# Patient Record
Sex: MALE | Race: WHITE | NOT HISPANIC OR LATINO | Employment: OTHER | URBAN - METROPOLITAN AREA
[De-identification: names, ages, dates, MRNs, and addresses within clinical notes are randomized per-mention and may not be internally consistent; named-entity substitution may affect disease eponyms.]

---

## 2018-03-21 DIAGNOSIS — M54.2 CERVICALGIA: Primary | ICD-10-CM

## 2018-04-02 PROCEDURE — 97140 MANUAL THERAPY 1/> REGIONS: CPT

## 2018-04-02 PROCEDURE — 97110 THERAPEUTIC EXERCISES: CPT

## 2018-04-04 ENCOUNTER — CLINICAL SUPPORT (OUTPATIENT)
Dept: REHABILITATION | Facility: HOSPITAL | Age: 83
End: 2018-04-04
Payer: MEDICARE

## 2018-04-04 DIAGNOSIS — M54.2 CERVICALGIA: ICD-10-CM

## 2018-04-04 DIAGNOSIS — M62.81 MUSCLE WEAKNESS (GENERALIZED): ICD-10-CM

## 2018-04-04 PROCEDURE — 97140 MANUAL THERAPY 1/> REGIONS: CPT | Mod: PN

## 2018-04-04 PROCEDURE — 97110 THERAPEUTIC EXERCISES: CPT | Mod: PN

## 2018-04-04 NOTE — PROGRESS NOTES
Physical Therapy Daily Treatment Note   Name: Jeffrey Ocasio  [facility] Number: [id number]    Evaluation Date: 4/4/2018  Visit #: 7/ 12   Authorization period Expiration: 12/31/18  Plan of Care Expiration: 05/01/2018  Precautions: standard    Time In: 8:00  Time Out: 9:30  Total 1:1 Treatment Time: 60 min    Diagnosis:   Encounter Diagnoses   Name Primary?    Cervicalgia     Muscle weakness (generalized)      Physician: Cinthia Shrestha MD  Treatment Orders: PT Eval and Treat    Subjective   Pt reports: patient reports no change in neck pain.    Pain Scale:  2/10 on VAS currently.   Pain Location: posterior cervical extending into left upper trap    Objective   JEFFREY received therapeutic exercises to develop strength, endurance, ROM, flexibility, posture and core stabilization for 30 minutes including:  Chin tucks x 10 reps  Seated Thoracic Extension over bolster in chair - hands behind head x 15 reps  Seated: PNF Pattern UE Ex in D1/D2 pattern with Red Tband x 12 reps each  Wall Slides with BUE x 25 reps  Supine: Serratus Punches with 3# x 15 reps  Supine: Shoulder flexion with 4#bar - bolseter under upper T-Spine x 15 reps  Nu-Step x 20 mins        JEFFREY received the following manual therapy techniques: Joint mobilizations, Manual traction and Soft tissue Mobilization were applied to the: Cervical and upper thoracic areas for 20  minutes.       Education provided re: Posture; patient requires constant cueing for sitting and standing posture; he continues to round shoulders forward, head flexed, posterior pelvic tilt in sitting.  It's a constant rodriguez to get him to establish good posture and seriously doubt whether he is compliant with this at home.   - progress towards goals   - role of PT in multi - disciplinary team, goals for PT  No spiritual or educational barriers to learning provided    Written Home Exercises Provided: .  Exercises were reviewed and JEFFREY was able to  demonstrate them prior to the end of the session.   Pt received a written copy of exercises to perform at home. ROSIE demonstrated fair  understanding of the education provided.     Assessment   ROSIE is progressing well towards his goals and no updates to goals at this time.     Pt prognosis is Fair. Pt will continue to benefit from skilled outpatient physical therapy to address the deficits listed in the problem list chart on initial evaluation, provide pt/family education and to maximize pt's level of independence in the home and community environment.     Medical necessity is demonstrated by the following IMPAIRMENTS/PROBLEM LIST:  Cervical Pain, Muscle Weakness in CORE mm and general extremities; Postural deficts    Anticipated barriers to physical therapy: patient's chronic postural deficts and sedentary lifestyle  Pt's spiritual, cultural and educational needs considered and pt agreeable to plan of care and goals.      Long Term goals:   1. Subjective pain decreased from 2/10 to 0/10 in Cervical Spine with return to daily activities  2. Patient able to demonstrate neutral neck posture in sitting and standing positions  3. Patient able to demonstrate improved cercial rotation to the left by 10 degrees without increased symptoms.  4. Patient indep with HEP for Posture  5. Neck Pain Disability Score decreased from 22% to <10% disability.      Plan   Continue with established Plan of Care towards Physical Therapy goals.   Discussed Plan of Care with patient: Yes    Dena Bauman, PT

## 2018-04-10 ENCOUNTER — CLINICAL SUPPORT (OUTPATIENT)
Dept: REHABILITATION | Facility: HOSPITAL | Age: 83
End: 2018-04-10
Payer: MEDICARE

## 2018-04-10 DIAGNOSIS — M62.81 MUSCLE WEAKNESS (GENERALIZED): ICD-10-CM

## 2018-04-10 DIAGNOSIS — M54.2 CERVICALGIA: Primary | ICD-10-CM

## 2018-04-10 PROCEDURE — 97110 THERAPEUTIC EXERCISES: CPT | Mod: PN

## 2018-04-10 PROCEDURE — 97140 MANUAL THERAPY 1/> REGIONS: CPT | Mod: PN

## 2018-04-10 NOTE — PROGRESS NOTES
Physical Therapy Daily Treatment Note   Name: Jeffrey Ocasio  [facility] Number: [id number]    Evaluation Date: 4/10/2018  Visit #: 8/ 12   Authorization period Expiration: 12/31/18  Plan of Care Expiration: 05/01/2018  Precautions: standard    Time In: 1:05  Time Out: 2:00  Total 1:1 Treatment Time:45 min    Diagnosis:   Encounter Diagnoses   Name Primary?    Cervicalgia Yes    Muscle weakness (generalized)      Physician: Cinthia Shrestha MD  Treatment Orders: PT Eval and Treat    Subjective   Pt reports: patient reports positve change in neck pain after last visit. Reports 0-1/10 pain in cervical/left upper trap with left rotation since last visit.    Pain Scale:  1/10 on VAS currently.   Pain Location: posterior cervical extending into left upper trap    Objective   JEFFREY received therapeutic exercises to develop strength, endurance, ROM, flexibility, posture and core stabilization for 30 minutes including:  Chin tucks x 10 reps  Seated Thoracic Extension over bolster in chair - hands behind head x 15 reps  Standing:: PNF Pattern UE Ex in D1/D2 pattern with Red Tband x 12 reps each  Wall Slides with BUE x 25 reps  Supine: Serratus Punches with 3# x 15 reps  Supine: Shoulder flexion with 4#bar -Tennis ball- under upper T-Spine x 15 reps          JEFFREY received the following manual therapy techniques: Joint mobilizations, Manual traction and Soft tissue Mobilization with and witthout  IASTM were applied to the: Cervical and upper thoracic areas for 15  minutes.       Education provided re: Posture; patient requires constant cueing for sitting and standing posture; he continues to round shoulders forward, head flexed, posterior pelvic tilt in sitting.  It's a constant rodriguez to get him to establish good posture and seriously doubt whether he is compliant with this at home.   - progress towards goals   - role of PT in multi - disciplinary team, goals for PT  No spiritual or educational  barriers to learning provided    Written Home Exercises Provided: .  Exercises were reviewed and ROSIE was able to demonstrate them prior to the end of the session.   Pt received a written copy of exercises to perform at home. ROSIE demonstrated fair  understanding of the education provided.     Assessment   ROSIE is progressing well towards his goals and no updates to goals at this time.     Pt prognosis is Fair. Pt will continue to benefit from skilled outpatient physical therapy to address the deficits listed in the problem list chart on initial evaluation, provide pt/family education and to maximize pt's level of independence in the home and community environment.     Medical necessity is demonstrated by the following IMPAIRMENTS/PROBLEM LIST:  Cervical Pain, Muscle Weakness in CORE mm and general extremities; Postural deficts    Anticipated barriers to physical therapy: patient's chronic postural deficts and sedentary lifestyle  Pt's spiritual, cultural and educational needs considered and pt agreeable to plan of care and goals.      Long Term goals:   1. Subjective pain decreased from 2/10 to 0/10 in Cervical Spine with return to daily activities  2. Patient able to demonstrate neutral neck posture in sitting and standing positions  3. Patient able to demonstrate improved cercial rotation to the left by 10 degrees without increased symptoms.  4. Patient indep with HEP for Posture  5. Neck Pain Disability Score decreased from 22% to <10% disability.      Plan   Continue with established Plan of Care towards Physical Therapy goals.   Discussed Plan of Care with patient: Yes    Dena Bauman, PT

## 2018-04-12 ENCOUNTER — CLINICAL SUPPORT (OUTPATIENT)
Dept: REHABILITATION | Facility: HOSPITAL | Age: 83
End: 2018-04-12
Payer: MEDICARE

## 2018-04-12 DIAGNOSIS — M62.81 MUSCLE WEAKNESS (GENERALIZED): ICD-10-CM

## 2018-04-12 DIAGNOSIS — M54.2 CERVICALGIA: Primary | ICD-10-CM

## 2018-04-12 PROCEDURE — 97110 THERAPEUTIC EXERCISES: CPT | Mod: PN

## 2018-04-12 PROCEDURE — 97140 MANUAL THERAPY 1/> REGIONS: CPT | Mod: PN

## 2018-04-13 NOTE — PROGRESS NOTES
Physical Therapy Daily Treatment Note   Name: Jeffrey Ocasio  [facility] Number: [id number]    Evaluation Date: 4/12/2018  Visit #: 9/ 12   Authorization period Expiration: 12/31/18  Plan of Care Expiration: 05/01/2018  Precautions: standard    Time In: 1:05  Time Out: 2:20  Total 1:1 Treatment Time:45 min    Diagnosis:   Encounter Diagnoses   Name Primary?    Cervicalgia Yes    Muscle weakness (generalized)      Physician: Cinthia Shrestha MD  Treatment Orders: PT Eval and Treat    Subjective   Pt reports: patient reports positve change in neck pain after last visit. Reports 0-1/10 pain in cervical/left upper trap with left rotation since last visit.    Pain Scale:  1/10 on VAS currently.   Pain Location: posterior cervical extending into left upper trap    Objective   JEFFREY received therapeutic exercises to develop strength, endurance, ROM, flexibility, posture and core stabilization for 30 minutes including:  Chin tucks x 10 reps  Seated Thoracic Extension over bolster in chair - hands behind head x 15 reps  Standing:: PNF Pattern UE Ex in D1/D2 pattern with Red Tband x 12 reps each  Wall Slides with BUE x 25 reps  Supine: Serratus Punches with 3# x 15 reps  Supine: Shoulder flexion with 4#bar -Tennis ball- under upper T-Spine x 15 reps          JEFFREY received the following manual therapy techniques: Joint mobilizations, Manual traction and Soft tissue Mobilization with and witthout  IASTM were applied to the: Cervical and upper thoracic areas for 15  Minutes. Thoracic extension mobilization - grade II to III from CT junction thru T9. Had patient perform seated thoracic extension over bolster x 10 reps following mobilization.      Education provided re: Posture; patient requires constant cueing for sitting and standing posture; he continues to round shoulders forward, head flexed, posterior pelvic tilt in sitting.  It's a constant rodriguez to get him to establish good posture and  seriously doubt whether he is compliant with this at home.   - progress towards goals   - role of PT in multi - disciplinary team, goals for PT  No spiritual or educational barriers to learning provided    Written Home Exercises Provided: .  Exercises were reviewed and ROSIE was able to demonstrate them prior to the end of the session.   Pt received a written copy of exercises to perform at home. ROSIE demonstrated fair  understanding of the education provided.     Assessment   ROSIE is progressing well towards his goals and no updates to goals at this time.     Pt prognosis is Fair. Pt will continue to benefit from skilled outpatient physical therapy to address the deficits listed in the problem list chart on initial evaluation, provide pt/family education and to maximize pt's level of independence in the home and community environment.     Medical necessity is demonstrated by the following IMPAIRMENTS/PROBLEM LIST:  Cervical Pain, Muscle Weakness in CORE mm and general extremities; Postural deficts    Anticipated barriers to physical therapy: patient's chronic postural deficts and sedentary lifestyle  Pt's spiritual, cultural and educational needs considered and pt agreeable to plan of care and goals.      Long Term goals:   1. Subjective pain decreased from 2/10 to 0/10 in Cervical Spine with return to daily activities  2. Patient able to demonstrate neutral neck posture in sitting and standing positions  3. Patient able to demonstrate improved cercial rotation to the left by 10 degrees without increased symptoms.  4. Patient indep with HEP for Posture  5. Neck Pain Disability Score decreased from 22% to <10% disability.      Plan   Continue with established Plan of Care towards Physical Therapy goals.   Discussed Plan of Care with patient: Yes    Dena Bauman, PT

## 2018-04-18 ENCOUNTER — CLINICAL SUPPORT (OUTPATIENT)
Dept: REHABILITATION | Facility: HOSPITAL | Age: 83
End: 2018-04-18
Payer: MEDICARE

## 2018-04-18 DIAGNOSIS — M62.81 MUSCLE WEAKNESS (GENERALIZED): ICD-10-CM

## 2018-04-18 DIAGNOSIS — M54.2 CERVICALGIA: Primary | ICD-10-CM

## 2018-04-18 PROCEDURE — 97140 MANUAL THERAPY 1/> REGIONS: CPT | Mod: PN

## 2018-04-18 PROCEDURE — 97110 THERAPEUTIC EXERCISES: CPT | Mod: PN

## 2018-04-18 PROCEDURE — G8981 BODY POS CURRENT STATUS: HCPCS | Mod: CJ,PN

## 2018-04-18 PROCEDURE — G8982 BODY POS GOAL STATUS: HCPCS | Mod: CI,PN

## 2018-04-18 NOTE — PROGRESS NOTES
Physical Therapy Daily Treatment Note   Name: Jeffrey Ocasio  [facility] Number: [id number]    Evaluation Date: 4/18/2018  Visit #: 10/ 12   Authorization period Expiration: 12/31/18  Plan of Care Expiration: 05/01/2018  Precautions: standard    Time In: 12:50  Time Out: 2:20  Total 1:1 Treatment Time   45  min    Diagnosis:   Encounter Diagnoses   Name Primary?    Cervicalgia Yes    Muscle weakness (generalized)      Physician: Cinthia Shrestha MD  Treatment Orders: PT Eval and Treat    Subjective   Pt reports: patient reports positve change in neck pain after last visit. Reports 0-1/10 pain in cervical/left upper trap with left rotation since last visit.    Pain Scale:  3/10 on VAS currently.   Pain Location: posterior cervical extending into left upper trap    Objective   JEFFREY received therapeutic exercises to develop strength, endurance, ROM, flexibility, posture and core stabilization for 30 minutes including:  Chin tucks x 10 reps  Seated Thoracic Extension over bolster in chair - hands behind head x 15 reps  Standing:: PNF Pattern UE Ex in D1/D2 pattern with Red Tband x 12 reps each  Wall Slides with BUE x 25 reps  Supine: Serratus Punches with 3# x 15 reps  Supine: Shoulder flexion with 4#bar -Tennis ball- under upper T-Spine x 15 reps          JEFFREY received the following manual therapy techniques: Joint mobilizations, Manual traction and Soft tissue Mobilization with and witthout  IASTM were applied to the: Cervical and upper thoracic areas for 15  Minutes. Thoracic extension mobilization - grade II to III from CT junction thru T9. Had patient perform seated thoracic extension over bolster x 10 reps following mobilization.      Education provided re: Posture; patient requires constant cueing for sitting and standing posture; he continues to round shoulders forward, head flexed, posterior pelvic tilt in sitting.  It's a constant rodriguez to get him to establish good posture and  seriously doubt whether he is compliant with this at home.   - progress towards goals   - role of PT in multi - disciplinary team, goals for PT  No spiritual or educational barriers to learning provided    Written Home Exercises Provided: .  Exercises were reviewed and ROSIE was able to demonstrate them prior to the end of the session.   Pt received a written copy of exercises to perform at home. ROSIE demonstrated fair  understanding of the education provided.     Assessment   ROSIE is progressing well towards his goals and no updates to goals at this time.  Patient with little lasting pain effects from therapy.  He continues with very poor posture with little lasting improvement in this.     Pt prognosis is Fair. Pt will continue to benefit from skilled outpatient physical therapy to address the deficits listed in the problem list chart on initial evaluation, provide pt/family education and to maximize pt's level of independence in the home and community environment.     Medical necessity is demonstrated by the following IMPAIRMENTS/PROBLEM LIST:  Cervical Pain, Muscle Weakness in CORE mm and general extremities; Postural deficts    Anticipated barriers to physical therapy: patient's chronic postural deficts and sedentary lifestyle  Pt's spiritual, cultural and educational needs considered and pt agreeable to plan of care and goals.      Long Term goals:   1. Subjective pain decreased from 2/10 to 0/10 in Cervical Spine with return to daily activities  2. Patient able to demonstrate neutral neck posture in sitting and standing positions  3. Patient able to demonstrate improved cercial rotation to the left by 10 degrees without increased symptoms.  4. Patient indep with HEP for Posture  5. Neck Pain Disability Score decreased from 22% to <10% disability.      Plan   Continue with established Plan of Care towards Physical Therapy goals.   Discussed Plan of Care with patient: Yes    Dena Bauman, PT

## 2018-04-20 ENCOUNTER — CLINICAL SUPPORT (OUTPATIENT)
Dept: REHABILITATION | Facility: HOSPITAL | Age: 83
End: 2018-04-20
Payer: MEDICARE

## 2018-04-20 DIAGNOSIS — M54.2 CERVICALGIA: Primary | ICD-10-CM

## 2018-04-20 DIAGNOSIS — M62.81 MUSCLE WEAKNESS (GENERALIZED): ICD-10-CM

## 2018-04-20 PROCEDURE — 97140 MANUAL THERAPY 1/> REGIONS: CPT | Mod: PN

## 2018-04-20 NOTE — PROGRESS NOTES
Physical Therapy Daily Treatment Note   Name: Jeffrey Ocasio  Clinic Number: [id number]    Evaluation Date: 4/20/2018  Visit #: 11/ 12   Authorization period Expiration: 12/31/18  Plan of Care Expiration: 05/01/2018  Precautions: standard    Time In: 10:50  Time Out: 11:50  Total 1:1 Treatment Time  20  min    Diagnosis:   Encounter Diagnoses   Name Primary?    Cervicalgia Yes    Muscle weakness (generalized)      Physician: Cinthia Shrestha MD  Treatment Orders: PT Eval and Treat    Subjective   Pt reports: patient reports positve change in neck pain after last visit. Reports 0-1/10 pain in cervical/left upper trap with left rotation since last visit.    Pain Scale: 2/10 on VAS currently.   Pain Location: posterior cervical extending into left upper trap    Objective   JEFFREY received therapeutic exercises to develop strength, endurance, ROM, flexibility, posture and core stabilization for 30 minutes including: - patient performed these indep with supervision as needed  Chin tucks x 10 reps  Seated Thoracic Extension over bolster in chair - hands behind head x 15 reps  Standing:: PNF Pattern UE Ex in D1/D2 pattern with Red Tband x 12 reps each  Wall Slides with BUE x 25 reps  Supine: Serratus Punches with 3# x 15 reps  Supine: Shoulder flexion with 4#bar -Tennis ball- under upper T-Spine x 15 reps          JEFFREY received the following manual therapy techniques: Joint mobilizations, Manual traction and Soft tissue Mobilization with and witthout  IASTM were applied to the: Cervical and upper thoracic areas for 20 Minutes. Thoracic extension mobilization - grade II to III from CT junction thru T9. Had patient perform seated thoracic extension over bolster x 10 reps following mobilization.      Education provided re: Posture; patient requires constant cueing for sitting and standing posture; he continues to round shoulders forward, head flexed, posterior pelvic tilt in sitting.  It's a  constant rodriguez to get him to establish good posture and seriously doubt whether he is compliant with this at home.   - progress towards goals   - role of PT in multi - disciplinary team, goals for PT  No spiritual or educational barriers to learning provided    Written Home Exercises Provided: .  Exercises were reviewed and ROSIE was able to demonstrate them prior to the end of the session.   Pt received a written copy of exercises to perform at home. ROSIE demonstrated fair  understanding of the education provided.     Assessment   ROSIE is really making little progress towards his goals.  He continues to have the same pain in the left upper trapezius area with rotation of cervical spine to the left.  His pain is no worse, has not progressed to radiating any further down his left arm.  His pain is due to cervical disc degeneration and poor posture/muscular activation in the posterior shoulder girdle area.   Patient with little lasting pain effects from therapy.  He continues with very poor posture with little lasting improvement in this.     Pt prognosis is Fair. Pt will continue to benefit from skilled outpatient physical therapy to address the deficits listed in the problem list chart on initial evaluation, provide pt/family education and to maximize pt's level of independence in the home and community environment.     Medical necessity is demonstrated by the following IMPAIRMENTS/PROBLEM LIST:  Cervical Pain, Muscle Weakness in CORE mm and general extremities; Postural deficts    Anticipated barriers to physical therapy: patient's chronic postural deficts and sedentary lifestyle  Pt's spiritual, cultural and educational needs considered and pt agreeable to plan of care and goals.      Long Term goals:   1. Subjective pain decreased from 2/10 to 0/10 in Cervical Spine with return to daily activities  2. Patient able to demonstrate neutral neck posture in sitting and standing positions  3. Patient able to  demonstrate improved cercial rotation to the left by 10 degrees without increased symptoms.  4. Patient indep with HEP for Posture  5. Neck Pain Disability Score decreased from 22% to <10% disability.      Plan   Continue with established Plan of Care - patient returning to [place] next week - will see for a visit next Monday before he returns to [place].   Discussed Plan of Care with patient: Yes    Dena Bauman, PT

## 2018-04-23 ENCOUNTER — CLINICAL SUPPORT (OUTPATIENT)
Dept: REHABILITATION | Facility: HOSPITAL | Age: 83
End: 2018-04-23
Payer: MEDICARE

## 2018-04-23 DIAGNOSIS — M54.2 CERVICALGIA: Primary | ICD-10-CM

## 2018-04-23 DIAGNOSIS — M62.81 MUSCLE WEAKNESS (GENERALIZED): ICD-10-CM

## 2018-04-23 PROCEDURE — 97110 THERAPEUTIC EXERCISES: CPT | Mod: PN

## 2018-04-23 PROCEDURE — 97140 MANUAL THERAPY 1/> REGIONS: CPT | Mod: PN

## 2018-04-23 NOTE — PROGRESS NOTES
Physical Therapy Daily Treatment Note   Name: Jeffrey Ocasio  Clinic Number: [id number]    Evaluation Date: 4/23/2018  Visit #: 12/ 12   Authorization period Expiration: 12/31/18  Plan of Care Expiration: 05/01/2018  Precautions: standard    Time In: 2:00  Time Out: 3:00  Total 1:1 Treatment Time  30  min    Diagnosis:   Encounter Diagnoses   Name Primary?    Cervicalgia Yes    Muscle weakness (generalized)      Physician: Cinthia Shrestha MD  Treatment Orders: PT Eval and Treat    Subjective   Pt reports: patient reports positve change in neck pain after last visit. Reports 0-1/10 pain in cervical/left upper trap with left rotation since last visit.    Pain Scale: 2/10 on VAS currently.   Pain Location: posterior cervical extending into left upper trap    Objective   JEFFREY received therapeutic exercises to develop strength, endurance, ROM, flexibility, posture and core stabilization for 30 minutes including: - patient performed these indep with supervision as needed  Chin tucks x 10 reps  Seated Thoracic Extension over bolster in chair - hands behind head x 15 reps  Standing:: PNF Pattern UE Ex in D1/D2 pattern with Red Tband x 12 reps each  Wall Slides with BUE x 25 reps  Supine: Serratus Punches with 3# x 15 reps  Supine: Shoulder flexion with 4#bar -Tennis ball- under upper T-Spine x 15 reps          JEFFREY received the following manual therapy techniques: Joint mobilizations, Manual traction and Soft tissue Mobilization with and witthout  IASTM were applied to the: Cervical and upper thoracic areas for 20 Minutes. Thoracic extension mobilization - grade II to III from CT junction thru T9. Had patient perform seated thoracic extension over bolster x 10 reps following mobilization.      Education provided re: Posture; patient requires constant cueing for sitting and standing posture; he continues to round shoulders forward, head flexed, posterior pelvic tilt in sitting.  It's a  constant rodriguez to get him to establish good posture and seriously doubt whether he is compliant with this at home.   - progress towards goals   - role of PT in multi - disciplinary team, goals for PT  No spiritual or educational barriers to learning provided    Written Home Exercises Provided: .  Exercises were reviewed and JEFFREY was able to demonstrate them prior to the end of the session.   Pt received a written copy of exercises to perform at home. JEFFREY demonstrated fair  understanding of the education provided.     Assessment   JEFFREY is really making little progress towards his goals.  He continues to have the same pain in the left upper trapezius area with rotation of cervical spine to the left.  His pain is no worse, has not progressed to radiating any further down his left arm.  His pain is due to cervical disc degeneration and poor posture/muscular activation in the posterior shoulder girdle area.   Patient with little lasting pain effects from therapy.  He continues with very poor posture with little lasting improvement in this.   There has been improvements in his active cervical ROM since starting therapy, as well as no increase in pain or radicular symptoms.  Subjective pain stays at 2-3/10 and is always with Left Cervical Rotation.    Current AROM:    Flexion: from 32 degrees to 34 degrees  Extension:  From 30 to 40 degrees  Right Rotation: from 80 to 80 degrees  Left Rotation: from 68 to 80 degrees.  Sidebending: patient does not have functional sidebending on either side - has to rotate first before he can bring his ear to his shoulder.     Pt prognosis is Fair Jeffrey has had 12 Physical Therapy vistis with no significant improvement in his primary complaint of neck pain when he turns his head. I have been able to improve his overall cervical ROM as well as make an effort to get him to exercise alittle more.  Postural issues are his primary deficit with significant disuse muscle atrophy  throughout his upper quarter.  Jeffrey is not interested in making any concerted effort to improve his posture or increase his activity level, so I feel that continued therapy would be of little benefit.      Medical necessity is demonstrated by the following IMPAIRMENTS/PROBLEM LIST:  Cervical Pain, Muscle Weakness in CORE mm and general extremities; Postural deficts    Anticipated barriers to physical therapy: patient's chronic postural deficts and sedentary lifestyle  Pt's spiritual, cultural and educational needs considered and pt agreeable to plan of care and goals.      Long Term goals:   1. Subjective pain decreased from 2/10 to 0/10 in Cervical Spine with return to daily activities  2. Patient able to demonstrate neutral neck posture in sitting and standing positions  3. Patient able to demonstrate improved cercial rotation to the left by 10 degrees without increased symptoms.  4. Patient indep with HEP for Posture  5. Neck Pain Disability Score decreased from 22% to <10% disability.      Plan   Progress Report sent to Dr. Shrestha - patient will return to discuss after this vist.   Discussed Plan of Care with patient: Yes    Dena Bauman, PT

## 2018-04-26 ENCOUNTER — CLINICAL SUPPORT (OUTPATIENT)
Dept: REHABILITATION | Facility: HOSPITAL | Age: 83
End: 2018-04-26
Payer: MEDICARE

## 2018-04-27 NOTE — PROGRESS NOTES
Patient just came from Dr. Shrestha's office - brought new orders for physical therapy for general strengthening to address weakness.  Patient did not want to stay today to do anything.  He scheduled for treatment 2x/week to begin next week.  Reassess next week for establishment of new goals.

## 2018-05-02 ENCOUNTER — CLINICAL SUPPORT (OUTPATIENT)
Dept: REHABILITATION | Facility: HOSPITAL | Age: 83
End: 2018-05-02
Payer: MEDICARE

## 2018-05-02 DIAGNOSIS — M62.81 MUSCLE WEAKNESS (GENERALIZED): Primary | ICD-10-CM

## 2018-05-02 PROCEDURE — 97164 PT RE-EVAL EST PLAN CARE: CPT | Mod: PN

## 2018-05-02 PROCEDURE — G8978 MOBILITY CURRENT STATUS: HCPCS | Mod: CK,PN

## 2018-05-02 PROCEDURE — G8979 MOBILITY GOAL STATUS: HCPCS | Mod: CJ,PN

## 2018-05-02 NOTE — PROGRESS NOTES
Physical Therapy Daily Treatment Note     Name: Jeffrey Ocasio  Clinic Number: [id number]    Therapy Diagnosis:   Encounter Diagnosis   Name Primary?    Muscle weakness (generalized) Yes     Physician: Cinthia Shrestha MD    Physician Orders: Physical Therapy Eval and Treat for generalized muscle weakness  Medical Diagnosis: gerneralized weakness   Evaluation Date:   Authorization period Expiration: 18  Plan of Care Certification Period: 18    Visit #: 12/ Visits authorized: 12  Time In:2:00  Time Out: 3:20  Total Billable Time: 45 minutes    Precautions: std    Subjective   Pt reports: I talked Dr. Shrestha into giving me a script to continue with my therapy so that I can at least come in and get some exercise. I know that I need it.             Response to previous treatment:patient came for 12 visits for cervicalgia; he did not make progress as far as subjective pain is concerned, but he made good progress with ROM increases in his neck. Jeffrey recognizes that most of his problems are related to posture and a sedentary lifestyle.  He is accepting of the fact that he needs to strengthen himself up.   Functional change: see previous progress report to MD.    Pain:  Current 2/10, worst 3/10, best 0/10   Location: back  and neck  bilateral    Objective   JEFFREY received therapeutic exercises to develop strength, flexibility, posture and core stabilization for 45  minutes includin. Nu-Step x 20 mins L5  2. Vigor Gym x 8 mins   3. Bridges x 15  4. SLR x 10  5. Hip Abduction x 10  6. DKC with Swiss Ball x 3 mins  7. Lumbar rolls x 3 mins  8. Seated Lumbar Flex with SB x 4 mins  9. Scapular Retraction 15x with black band  10. Wall Slides x 20    JEFFREY received the following supervised modalities after being cleared for contradictions    Home Exercises Provided and Patient Education Provided     Education provided re:   - progress towards goals   - role of therapy in multi  "- disciplinary team, goals for therapy  No spiritual or educational barriers to learning provided    Written Home Exercises Provided: .  Exercises were reviewed and JEFFREY was able to demonstrate them prior to the end of the session.   Pt received a written copy of exercises to perform at home. JEFFREY demonstrated good  understanding of the education provided.     Assessment   Jeffrey is reporting decreased "steadiness' on his feet as well as continued lower back pain.  He has been coming for therapy to address cervical pain for past 12 visits with improvement in cervical ROM but no improvement in pain. Patient with sedentary lifestyle, muscle disuse atrophy in CORE mm as well as decreased ability to engage in sustained activity secondary to fatigue. Jeffrey realizes that he needs to exercise and feels that participating in a formal Physical Therapy program will assist him in improving his functional mobility and improve his quality of life.   JEFFREY is progressing well towards his goals.     30 sec Chair <>Stand: 5 reps  6 min walk test: able to ambulate 4 mins on level surface. Stops activity secondary to increased LBP and fatigue.   G-Codes:  Current:: CK        Goal: CJ  Pt prognosis is Good.     Pt will continue to benefit from skilled outpatient physical therapy to address the deficits listed in the problem list box on initial evaluation, provide pt/family education and to maximize pt's level of independence in the home and community environment.     Anticipated barriers to physical therapy: patient's reluctance to be consistent with any lasting exercise program.    Pt's spiritual, cultural and educational needs considered and pt agreeable to plan of care and goals.    Goals:   LTG: ( 4 weeks)  1. Subjective pain no more than 2/10 with daily activity  2. Patient able to complete 8 reps chair <> 30 secs (<20" height)  3. Patient able to demonstrate imrpoved endurance for activity - able to ambulate x 6 " mins without increase in symptoms.       Plan   Continue with established plan of care. Yes, patient to continue 2x/week x 4-6 weeks for Ther Exercise, strengthening, cardiovascular activity    Dena Bauman, PT

## 2018-05-04 ENCOUNTER — CLINICAL SUPPORT (OUTPATIENT)
Dept: REHABILITATION | Facility: HOSPITAL | Age: 83
End: 2018-05-04
Payer: MEDICARE

## 2018-05-04 DIAGNOSIS — M62.81 MUSCLE WEAKNESS (GENERALIZED): Primary | ICD-10-CM

## 2018-05-04 PROCEDURE — 97110 THERAPEUTIC EXERCISES: CPT | Mod: PN

## 2018-05-04 NOTE — PROGRESS NOTES
Physical Therapy Daily Treatment Note     Name: Jeffrey Ocasio  [facility] Number: [id number]    Therapy Diagnosis:   Encounter Diagnosis   Name Primary?    Muscle weakness (generalized) Yes     Physician: Cinthia Shrestha MD    Physician Orders: Physical Therapy Eval and Treat for generalized muscle weakness  Medical Diagnosis: gerneralized weakness   Evaluation Date: 18  Authorization period Expiration: 18  Plan of Care Certification Period: 18    Visit #: 12/ Visits authorized: 12  Time In:10:55  Time Out: 12:05  Total Billable Time: 45 minutes    Precautions: std    Subjective   Pt reports:no new c/o's  Response to previous treatment:  Functional change: see previous progress report to MD.    Pain:  Current 2/10, worst 3/10, best 0/10   Location: back  and neck  bilateral    Objective   JEFFREY received therapeutic exercises to develop strength, flexibility, posture and core stabilization for 45  minutes includin. Nu-Step x 20 mins L5  2. Vigor Gym x 8 mins   3. Bridges x 20  4. SLR x 10  5. Hip Abduction x 10  6. DKC with Swiss Ball x 3 mins  7. Lumbar rolls x 3 mins  8. Seated Lumbar Flex with SB x 4 mins  9. Scapular Retraction 15x with black band  10. Wall Slides x 20    JEFFREY received the following supervised modalities after being cleared for contradictions    Home Exercises Provided and Patient Education Provided     Education provided re:   - progress towards goals   - role of therapy in multi - disciplinary team, goals for therapy  No spiritual or educational barriers to learning provided    Written Home Exercises Provided: .  Exercises were reviewed and JEFFREY was able to demonstrate them prior to the end of the session.   Pt received a written copy of exercises to perform at home. JEFFREY demonstrated good  understanding of the education provided.     Assessment   Jeffrey is JEFFREY is progressing well towards his goals. Patient did well with exercises.     30  "sec Chair <>Stand: 5 reps  6 min walk test: able to ambulate 4 mins on level surface. Stops activity secondary to increased LBP and fatigue.   G-Codes:  Current:: CK        Goal: CJ  Pt prognosis is Good.     Pt will continue to benefit from skilled outpatient physical therapy to address the deficits listed in the problem list box on initial evaluation, provide pt/family education and to maximize pt's level of independence in the home and community environment.     Anticipated barriers to physical therapy: patient's reluctance to be consistent with any lasting exercise program.    Pt's spiritual, cultural and educational needs considered and pt agreeable to plan of care and goals.    Goals:   LTG: ( 4 weeks)  1. Subjective pain no more than 2/10 with daily activity  2. Patient able to complete 8 reps chair <> 30 secs (<20" height)  3. Patient able to demonstrate imrpoved endurance for activity - able to ambulate x 6 mins without increase in symptoms.       Plan   Continue with established plan of care. Yes, patient to continue 2x/week x 4-6 weeks for Ther Exercise, strengthening, cardiovascular activity    Jonathan Favre, PTA   "

## 2018-05-09 ENCOUNTER — CLINICAL SUPPORT (OUTPATIENT)
Dept: REHABILITATION | Facility: HOSPITAL | Age: 83
End: 2018-05-09
Payer: MEDICARE

## 2018-05-09 DIAGNOSIS — M62.81 MUSCLE WEAKNESS (GENERALIZED): Primary | ICD-10-CM

## 2018-05-09 PROCEDURE — 97110 THERAPEUTIC EXERCISES: CPT | Mod: PN

## 2018-05-09 NOTE — PROGRESS NOTES
Physical Therapy Daily Treatment Note     Name: Jeffrey Ocasio  [facility] Number: [id number]    Therapy Diagnosis:   Encounter Diagnosis   Name Primary?    Muscle weakness (generalized) Yes     Physician: Cinthia Shrestha MD    Physician Orders: Physical Therapy Eval and Treat for generalized muscle weakness  Medical Diagnosis: gerneralized weakness   Evaluation Date: 18  Authorization period Expiration: 18  Plan of Care Certification Period: 18    Visit #: 12/ Visits authorized: 12  Time In: 1:40  Time Out: 2:45  Total Billable Time: 45 minutes    Precautions: std    Subjective   Pt reports:no new c/o's  Response to previous treatment:  Functional change: see previous progress report to MD.    Pain:  Current 3/10, worst 3/10, best 0/10   Location: back  and neck  bilateral    Objective   JEFFREY received therapeutic exercises to develop strength, flexibility, posture and core stabilization for 45 minutes includin. Nu-Step x 20 mins L5  2. Vigor Gym level 7 x 8 mins   3. Bridges x 20  4. SLR x 10  5. Hip Abduction x 10  6. DKC with Swiss Ball x 3 mins  7. Lumbar rolls x 3 mins  8. Seated Lumbar Flex with SB x 4 mins  9. Scapular Retraction 15x with black band  10. Wall Slides x 20    JEFFREY received the following supervised modalities after being cleared for contradictions    Home Exercises Provided and Patient Education Provided     Education provided re:   - progress towards goals   - role of therapy in multi - disciplinary team, goals for therapy  No spiritual or educational barriers to learning provided    Written Home Exercises Provided: .  Exercises were reviewed and JEFFREY was able to demonstrate them prior to the end of the session.    JEFFREY demonstrated good  understanding of the education provided.     Assessment   Jeffrey is JEFFREY is progressing well towards his goals. Patient did well with exercises.     30 sec Chair <>Stand: 5 reps  6 min walk test: able  "to ambulate 4 mins on level surface. Stops activity secondary to increased LBP and fatigue.   G-Codes:  Current:: CK        Goal: CJ  Pt prognosis is Good.     Pt will continue to benefit from skilled outpatient physical therapy to address the deficits listed in the problem list box on initial evaluation, provide pt/family education and to maximize pt's level of independence in the home and community environment.     Anticipated barriers to physical therapy: patient's reluctance to be consistent with any lasting exercise program.    Pt's spiritual, cultural and educational needs considered and pt agreeable to plan of care and goals.    Goals:   LTG: ( 4 weeks)  1. Subjective pain no more than 2/10 with daily activity  2. Patient able to complete 8 reps chair <> 30 secs (<20" height)  3. Patient able to demonstrate imrpoved endurance for activity - able to ambulate x 6 mins without increase in symptoms.       Plan   Continue with established plan of care. Yes, patient to continue 2x/week x 4-6 weeks for Ther Exercise, strengthening, cardiovascular activity    Jonathan Favre, PTA   "

## 2018-05-11 ENCOUNTER — CLINICAL SUPPORT (OUTPATIENT)
Dept: REHABILITATION | Facility: HOSPITAL | Age: 83
End: 2018-05-11
Payer: MEDICARE

## 2018-05-11 DIAGNOSIS — M62.81 MUSCLE WEAKNESS (GENERALIZED): Primary | ICD-10-CM

## 2018-05-11 PROCEDURE — 97110 THERAPEUTIC EXERCISES: CPT | Mod: PN

## 2018-05-16 ENCOUNTER — CLINICAL SUPPORT (OUTPATIENT)
Dept: REHABILITATION | Facility: HOSPITAL | Age: 83
End: 2018-05-16
Payer: MEDICARE

## 2018-05-16 DIAGNOSIS — M62.81 MUSCLE WEAKNESS (GENERALIZED): Primary | ICD-10-CM

## 2018-05-16 PROCEDURE — 97110 THERAPEUTIC EXERCISES: CPT | Mod: PN

## 2018-05-16 NOTE — PROGRESS NOTES
Physical Therapy Daily Treatment Note     Name: Jeffrey Ocasio  Clinic Number: [id number]    Therapy Diagnosis:   Encounter Diagnosis   Name Primary?    Muscle weakness (generalized) Yes     Physician: Cinthia Shrestha MD    Physician Orders: Physical Therapy Eval and Treat for generalized muscle weakness  Medical Diagnosis: gerneralized weakness   Evaluation Date: 18  Authorization period Expiration: 18  Plan of Care Certification Period: 18    Visit #: 4/ Visits authorized: 12  Time In: 1:00  Time Out: 1:45  Total Billable Time: 30 minutes    Precautions: std    Subjective   Pt reports: increased soreness in LB after working in the garage yesterday.  Response to previous treatment:  Functional change: see previous progress report to MD.    Pain:  Current 4/10, worst 4/10, best 0/10   Location: back  and neck  bilateral    Objective   JEFFREY received therapeutic exercises to develop strength, flexibility, posture and core stabilization for 45 minutes includin. Nu-Step x 20 mins L5-DNP  2. Vigor Gym level 7 x 8 mins   3. Bridges x 20  4. SLR x 10  5. Hip Abduction x 10  6. DKC with Swiss Ball x 3 mins  7. Lumbar rolls x 3 mins  8. Seated Lumbar Flex with SB x 4 mins  9. Scapular Retraction 20 x with black band  10. Wall Slides x 20    JEFFREY received the following supervised modalities after being cleared for contradictions    Home Exercises Provided and Patient Education Provided     Education provided re:   - progress towards goals   - role of therapy in multi - disciplinary team, goals for therapy  No spiritual or educational barriers to learning provided    Written Home Exercises Provided: .  Exercises were reviewed and JEFFREY was able to demonstrate them prior to the end of the session.    JEFFREY demonstrated good  understanding of the education provided.     Assessment   Jeffrey is JEFFREY is progressing well towards his goals. Patient did well with exercises.  "    30 sec Chair <>Stand: 5 reps  6 min walk test: able to ambulate 4 mins on level surface. Stops activity secondary to increased LBP and fatigue.   G-Codes:  Current:: CK        Goal: CJ  Pt prognosis is Good.     Pt will continue to benefit from skilled outpatient physical therapy to address the deficits listed in the problem list box on initial evaluation, provide pt/family education and to maximize pt's level of independence in the home and community environment.     Anticipated barriers to physical therapy: patient's reluctance to be consistent with any lasting exercise program.    Pt's spiritual, cultural and educational needs considered and pt agreeable to plan of care and goals.    Goals:   LTG: ( 4 weeks)  1. Subjective pain no more than 2/10 with daily activity  2. Patient able to complete 8 reps chair <> 30 secs (<20" height)  3. Patient able to demonstrate imrpoved endurance for activity - able to ambulate x 6 mins without increase in symptoms.       Plan   Continue with established plan of care. Yes, patient to continue 2x/week x 4-6 weeks for Ther Exercise, strengthening, cardiovascular activity    Jonathan Favre, PTA   "

## 2018-05-21 ENCOUNTER — CLINICAL SUPPORT (OUTPATIENT)
Dept: REHABILITATION | Facility: HOSPITAL | Age: 83
End: 2018-05-21
Payer: MEDICARE

## 2018-05-21 DIAGNOSIS — M62.81 MUSCLE WEAKNESS (GENERALIZED): Primary | ICD-10-CM

## 2018-05-21 PROCEDURE — 97110 THERAPEUTIC EXERCISES: CPT | Mod: PN

## 2018-05-21 NOTE — PROGRESS NOTES
Physical Therapy Daily Treatment Note     Name: Jeffrey Ocasio  [facility] Number: [id number]    Therapy Diagnosis:   Encounter Diagnosis   Name Primary?    Muscle weakness (generalized) Yes     Physician: Cinthia Shrestha MD    Physician Orders: Physical Therapy Eval and Treat for generalized muscle weakness  Medical Diagnosis: gerneralized weakness   Evaluation Date: 18  Authorization period Expiration: 18  Plan of Care Certification Period: 18    Visit #: 6/ Visits authorized: 12  Time In: 1:55  Time Out: 3:00  Total Billable Time: 30 minutes    Precautions: std    Subjective   Pt reports:   Response to previous treatment:  Functional change: see previous progress report to MD.    Pain:  Current 2/10, worst 2/10, best 0/10   Location: back  and neck  bilateral    Objective   JEFFREY received therapeutic exercises to develop strength, flexibility, posture and core stabilization for 45 minutes includin. Nu-Step x 20 mins L5  2. Vigor Gym level 7 x 8 mins   3. Bridges x 20  4. SLR 2 x 10  5. Hip Abduction x 10  6. DKC with Swiss Ball x 3 mins  7. Lumbar rolls x 3 mins  8. Seated Lumbar Flex with SB x 4 mins  9. Scapular Retraction 20 x with black band  10. Wall Slides x 20    JEFFREY received the following supervised modalities after being cleared for contradictions    Home Exercises Provided and Patient Education Provided     Education provided re:   - progress towards goals   - role of therapy in multi - disciplinary team, goals for therapy  No spiritual or educational barriers to learning provided    Written Home Exercises Provided: .  Exercises were reviewed and JEFFREY was able to demonstrate them prior to the end of the session.    JEFFREY demonstrated good  understanding of the education provided.     Assessment   Jeffrey is JEFFREY is progressing well towards his goals. Patient did well with exercises.     30 sec Chair <>Stand: 5 reps  6 min walk test: able to  "ambulate 4 mins on level surface. Stops activity secondary to increased LBP and fatigue.   G-Codes:  Current:: CK        Goal: CJ  Pt prognosis is Good.     Pt will continue to benefit from skilled outpatient physical therapy to address the deficits listed in the problem list box on initial evaluation, provide pt/family education and to maximize pt's level of independence in the home and community environment.     Anticipated barriers to physical therapy: patient's reluctance to be consistent with any lasting exercise program.    Pt's spiritual, cultural and educational needs considered and pt agreeable to plan of care and goals.    Goals:   LTG: ( 4 weeks)  1. Subjective pain no more than 2/10 with daily activity  2. Patient able to complete 8 reps chair <> 30 secs (<20" height)  3. Patient able to demonstrate imrpoved endurance for activity - able to ambulate x 6 mins without increase in symptoms.       Plan   Continue with established plan of care. Yes, patient to continue 2x/week x 4-6 weeks for Ther Exercise, strengthening, cardiovascular activity    Jonathan Favre, PTA   "

## 2018-05-24 ENCOUNTER — CLINICAL SUPPORT (OUTPATIENT)
Dept: REHABILITATION | Facility: HOSPITAL | Age: 83
End: 2018-05-24
Payer: MEDICARE

## 2018-05-24 DIAGNOSIS — M62.81 MUSCLE WEAKNESS (GENERALIZED): Primary | ICD-10-CM

## 2018-05-24 PROCEDURE — 97110 THERAPEUTIC EXERCISES: CPT | Mod: PN

## 2018-05-24 NOTE — PROGRESS NOTES
Physical Therapy Daily Treatment Note     Name: Jeffrey Ocasio  [facility] Number: [id number]    Therapy Diagnosis:   Encounter Diagnosis   Name Primary?    Muscle weakness (generalized) Yes     Physician: Cinthia Shrestha MD    Physician Orders: Physical Therapy Eval and Treat for generalized muscle weakness  Medical Diagnosis: gerneralized weakness   Evaluation Date: 18  Authorization period Expiration: 18  Plan of Care Certification Period: 18    Visit #: 7/ Visits authorized: 12  Time In: 2:05  Time Out: 3:10  Total Billable Time: 45 minutes    Precautions: std    Subjective   Pt reports:   Response to previous treatment:  Functional change: see previous progress report to MD.    Pain:  Current 3/10, worst 3/10, best 0/10   Location: left hip    Objective   JEFFREY received therapeutic exercises to develop strength, flexibility, posture and core stabilization for 45 minutes includin. Nu-Step x 20 mins L5  2. Vigor Gym level 7 x 8 mins   3. Bridges x 20  4. SLR 2 x 10  5. Hip Abduction x 10  6. DKC with Swiss Ball x 3 mins  7. Lumbar rolls x 3 mins  8. Seated Lumbar Flex with SB x 4 mins  9. Scapular Retraction 20 x with black band  10. Wall Slides x 20    JEFFREY received the following supervised modalities after being cleared for contradictions    Home Exercises Provided and Patient Education Provided     Education provided re:   - progress towards goals   - role of therapy in multi - disciplinary team, goals for therapy  No spiritual or educational barriers to learning provided    Written Home Exercises Provided: .  Exercises were reviewed and JEFFREY was able to demonstrate them prior to the end of the session.    JEFFREY demonstrated good  understanding of the education provided.     Assessment   Jeffrey is JEFFREY is progressing well towards his goals. Patient did well with exercises.     30 sec Chair <>Stand: 5 reps  6 min walk test: able to ambulate 4 mins on level  "surface. Stops activity secondary to increased LBP and fatigue.   G-Codes:  Current:: CK        Goal: CJ  Pt prognosis is Good.     Pt will continue to benefit from skilled outpatient physical therapy to address the deficits listed in the problem list box on initial evaluation, provide pt/family education and to maximize pt's level of independence in the home and community environment.     Anticipated barriers to physical therapy: patient's reluctance to be consistent with any lasting exercise program.    Pt's spiritual, cultural and educational needs considered and pt agreeable to plan of care and goals.    Goals:   LTG: ( 4 weeks)  1. Subjective pain no more than 2/10 with daily activity  2. Patient able to complete 8 reps chair <> 30 secs (<20" height)  3. Patient able to demonstrate imrpoved endurance for activity - able to ambulate x 6 mins without increase in symptoms.       Plan   Continue with established plan of care. Yes, patient to continue 2x/week x 4-6 weeks for Ther Exercise, strengthening, cardiovascular activity    Jonathan Favre, PTA   "

## 2018-05-29 ENCOUNTER — CLINICAL SUPPORT (OUTPATIENT)
Dept: REHABILITATION | Facility: HOSPITAL | Age: 83
End: 2018-05-29
Payer: MEDICARE

## 2018-05-29 DIAGNOSIS — M62.81 MUSCLE WEAKNESS (GENERALIZED): Primary | ICD-10-CM

## 2018-05-29 PROCEDURE — 97110 THERAPEUTIC EXERCISES: CPT | Mod: PN

## 2018-05-29 NOTE — PROGRESS NOTES
Physical Therapy Daily Treatment Note     Name: Jeffrey Booker  Clinic Number: [id number]    Therapy Diagnosis:   Encounter Diagnosis   Name Primary?    Muscle weakness (generalized) Yes     Physician: Cinthia Shrestha MD    Physician Orders: Physical Therapy Eval and Treat for generalized muscle weakness  Medical Diagnosis: gerneralized weakness   Evaluation Date: 18  Authorization period Expiration: 18  Plan of Care Certification Period: 18    Visit #: 8/ Visits authorized: 12  Time In: 2:10  Time Out: 3:15  Total Billable Time: 45 minutes    Precautions: std    Subjective   Pt reports: no new c/o's  Response to previous treatment:  Functional change: see previous progress report to MD.    Pain:  Current 0/10, worst 0/10, best 0/10   Location:     Objective   JEFFREY received therapeutic exercises to develop strength, flexibility, posture and core stabilization for 45 minutes includin. Nu-Step x 20 mins L5  2. Vigor Gym level 7 x 8 mins   3. Bridges x 20  4. SLR 2 x 10  5. Hip Abduction x 10  6. DKC with Swiss Ball x 3 mins  7. Lumbar rolls x 3 mins  8. Seated Lumbar Flex with SB x 4 mins  9. Scapular Retraction 20 x with black band  10. Wall Slides x 20    JEFFREY received the following supervised modalities after being cleared for contradictions    Home Exercises Provided and Patient Education Provided     Education provided re:   - progress towards goals   - role of therapy in multi - disciplinary team, goals for therapy  No spiritual or educational barriers to learning provided    Written Home Exercises Provided: .  Exercises were reviewed and JEFFREY was able to demonstrate them prior to the end of the session.    JEFFREY demonstrated good  understanding of the education provided.     Assessment   Jeffrey is JEFFREY is progressing well towards his goals. Patient did well with exercises.     30 sec Chair <>Stand: 5 reps  6 min walk test: able to ambulate 4 mins on  "level surface. Stops activity secondary to increased LBP and fatigue.   G-Codes:  Current:: CK        Goal: CJ  Pt prognosis is Good.     Pt will continue to benefit from skilled outpatient physical therapy to address the deficits listed in the problem list box on initial evaluation, provide pt/family education and to maximize pt's level of independence in the home and community environment.     Anticipated barriers to physical therapy: patient's reluctance to be consistent with any lasting exercise program.    Pt's spiritual, cultural and educational needs considered and pt agreeable to plan of care and goals.    Goals:   LTG: ( 4 weeks)  1. Subjective pain no more than 2/10 with daily activity  2. Patient able to complete 8 reps chair <> 30 secs (<20" height)  3. Patient able to demonstrate imrpoved endurance for activity - able to ambulate x 6 mins without increase in symptoms.       Plan   Continue with established plan of care. Yes, patient to continue 2x/week x 4-6 weeks for Ther Exercise, strengthening, cardiovascular activity    Jonathan Favre, PTA   "

## 2018-05-31 ENCOUNTER — CLINICAL SUPPORT (OUTPATIENT)
Dept: REHABILITATION | Facility: HOSPITAL | Age: 83
End: 2018-05-31
Payer: MEDICARE

## 2018-05-31 DIAGNOSIS — M62.81 MUSCLE WEAKNESS (GENERALIZED): Primary | ICD-10-CM

## 2018-05-31 PROCEDURE — 97110 THERAPEUTIC EXERCISES: CPT | Mod: PN

## 2018-05-31 NOTE — PROGRESS NOTES
Physical Therapy Daily Treatment Note     Name: Jeffrey Ocasio  [facility] Number: [id number]    Therapy Diagnosis:   Encounter Diagnosis   Name Primary?    Muscle weakness (generalized) Yes     Physician: Cinthia Shrestha MD    Physician Orders: Physical Therapy Eval and Treat for generalized muscle weakness  Medical Diagnosis: gerneralized weakness   Evaluation Date: 18  Authorization period Expiration: 18  Plan of Care Certification Period: 18    Visit #: 9/ Visits authorized: 12  Time In: 1:40  Time Out: 2:40  Total Billable Time: 45 minutes    Precautions: std    Subjective   Pt reports: no new c/o's  Response to previous treatment:  Functional change: see previous progress report to MD.    Pain:  Current 1/10, worst 0/10, best 0/10   Location:     Objective   JEFFREY received therapeutic exercises to develop strength, flexibility, posture and core stabilization for 45 minutes includin. Nu-Step x 20 mins L5  2. Vigor Gym level 7 x 8 mins   3. Bridges x 20  4. SLR 2 x 10  5. Hip Abduction x 10  6. DKC with Swiss Ball x 3 mins  7. Lumbar rolls x 3 mins  8. Seated Lumbar Flex with SB x 4 mins  9. Scapular Retraction 20 x with black band  10. Wall Slides x 20    JEFFREY received the following supervised modalities after being cleared for contradictions    Home Exercises Provided and Patient Education Provided     Education provided re:   - progress towards goals   - role of therapy in multi - disciplinary team, goals for therapy  No spiritual or educational barriers to learning provided    Written Home Exercises Provided: .  Exercises were reviewed and JEFFREY was able to demonstrate them prior to the end of the session.    JEFRFEY demonstrated good  understanding of the education provided.     Assessment   Jeffrey is JEFFREY is progressing well towards his goals. Patient did well with exercises.     30 sec Chair <>Stand: 5 reps  6 min walk test: able to ambulate 4 mins on  "level surface. Stops activity secondary to increased LBP and fatigue.   G-Codes:  Current:: CK        Goal: CJ  Pt prognosis is Good.     Pt will continue to benefit from skilled outpatient physical therapy to address the deficits listed in the problem list box on initial evaluation, provide pt/family education and to maximize pt's level of independence in the home and community environment.     Anticipated barriers to physical therapy: patient's reluctance to be consistent with any lasting exercise program.    Pt's spiritual, cultural and educational needs considered and pt agreeable to plan of care and goals.    Goals:   LTG: ( 4 weeks)  1. Subjective pain no more than 2/10 with daily activity  2. Patient able to complete 8 reps chair <> 30 secs (<20" height)  3. Patient able to demonstrate imrpoved endurance for activity - able to ambulate x 6 mins without increase in symptoms.       Plan   Continue with established plan of care. Yes, patient to continue 2x/week x 4-6 weeks for Ther Exercise, strengthening, cardiovascular activity    Jonathan Favre, PTA   "

## 2018-06-05 ENCOUNTER — CLINICAL SUPPORT (OUTPATIENT)
Dept: REHABILITATION | Facility: HOSPITAL | Age: 83
End: 2018-06-05
Payer: MEDICARE

## 2018-06-05 DIAGNOSIS — M62.81 MUSCLE WEAKNESS (GENERALIZED): Primary | ICD-10-CM

## 2018-06-05 PROCEDURE — G8978 MOBILITY CURRENT STATUS: HCPCS | Mod: CK,PN

## 2018-06-05 PROCEDURE — G8979 MOBILITY GOAL STATUS: HCPCS | Mod: CJ,PN

## 2018-06-05 PROCEDURE — 97110 THERAPEUTIC EXERCISES: CPT | Mod: PN

## 2018-06-05 NOTE — PROGRESS NOTES
Physical Therapy Daily Treatment Note     Name: Jeffrey Ocasio  [facility] Number: [id number]    Therapy Diagnosis:   Encounter Diagnosis   Name Primary?    Muscle weakness (generalized) Yes     Physician: Cinthia Shrestha MD    Physician Orders: Physical Therapy Eval and Treat for generalized muscle weakness  Medical Diagnosis: gerneralized weakness   Evaluation Date: 18  Authorization period Expiration: 18  Plan of Care Certification Period: 18    Visit #: 10 / Visits authorized: 12  Time In: 1:55  Time Out: 3:00  Total Billable Time: 45 minutes    Precautions: std    Subjective   Pt reports: no new c/o's  Response to previous treatment:  Functional change: see previous progress report to MD.    Pain:  Current 2/10, worst 0/10, best 0/10   Location:     Objective   JEFFREY received therapeutic exercises to develop strength, flexibility, posture and core stabilization for 45 minutes includin. Nu-Step x 20 mins L5  2. Vigor Gym level 7 x 8 mins   3. Bridges x 20  4. SLR 2 x 10  5. Hip Abduction x 10  6. DKC with Swiss Ball x 3 mins  7. Lumbar rolls x 3 mins  8. Seated Lumbar Flex with SB x 4 mins  9. Scapular Retraction 20 x with black band  10. Wall Slides x 20    JEFFREY received the following supervised modalities after being cleared for contradictions    Home Exercises Provided and Patient Education Provided     Education provided re:   - progress towards goals   - role of therapy in multi - disciplinary team, goals for therapy  No spiritual or educational barriers to learning provided    Written Home Exercises Provided: .  Exercises were reviewed and JEFFREY was able to demonstrate them prior to the end of the session.    JEFFREY demonstrated good  understanding of the education provided.     Assessment   Jeffrey is JEFFREY is progressing well towards his goals. Patient did well with exercises.     Case conference with Jonathan Favre, PTA to discuss the evaluation,  "review the established plan of care, and provide the PTA with the following instructions, if applicable, for the safe and effective treatment of : Elaine Murphy has 2 visits remaining on his current scrip.  He is really more appropriate for Stay-Fit Program for continued care following this.  He needs encouragement to keep up with activity.     Current Progress toward functional measures:  30 sec Chair <>Stand: 6 reps  6 min walk test: able to ambulate 5 mins on level surface. Stops activity secondary to increased LBP and fatigue.   Not enough to change G-Code modifiers.   G-Codes:  Current:: CK        Goal: CJ  Pt prognosis is Good.     Pt will continue to benefit from skilled outpatient physical therapy to address the deficits listed in the problem list box on initial evaluation, provide pt/family education and to maximize pt's level of independence in the home and community environment.     Anticipated barriers to physical therapy: patient's reluctance to be consistent with any lasting exercise program.    Pt's spiritual, cultural and educational needs considered and pt agreeable to plan of care and goals.    Goals:   LTG: ( 4 weeks)  1. Subjective pain no more than 2/10 with daily activity  2. Patient able to complete 8 reps chair <> 30 secs (<20" height)  3. Patient able to demonstrate imrpoved endurance for activity - able to ambulate x 6 mins without increase in symptoms.       Plan   Continue with established plan of care. Yes, patient to continue 2x/week x 4-6 weeks for Ther Exercise, strengthening, cardiovascular activity    Jonathan Favre, PTA   "

## 2018-06-07 ENCOUNTER — CLINICAL SUPPORT (OUTPATIENT)
Dept: REHABILITATION | Facility: HOSPITAL | Age: 83
End: 2018-06-07
Payer: MEDICARE

## 2018-06-07 DIAGNOSIS — M62.81 MUSCLE WEAKNESS (GENERALIZED): Primary | ICD-10-CM

## 2018-06-07 PROCEDURE — 97110 THERAPEUTIC EXERCISES: CPT | Mod: PN

## 2018-06-07 NOTE — PROGRESS NOTES
Physical Therapy Daily Treatment Note     Name: Jeffrey Ocasio  [facility] Number: [id number]    Therapy Diagnosis:   Encounter Diagnosis   Name Primary?    Muscle weakness (generalized) Yes     Physician: Cinthia Shrestha MD    Physician Orders: Physical Therapy Eval and Treat for generalized muscle weakness  Medical Diagnosis: gerneralized weakness   Evaluation Date: 18  Authorization period Expiration: 18  Plan of Care Certification Period: 18    Visit #: 11 / Visits authorized: 12  Time In: 1:55  Time Out: 3:05  Total Billable Time: 45 minutes    Precautions: std    Subjective   Pt reports: no new c/o's  Response to previous treatment:  Functional change: see previous progress report to MD.    Pain:  Current 2/10, worst 0/10, best 0/10   Location:     Objective   JEFFREY received therapeutic exercises to develop strength, flexibility, posture and core stabilization for 45 minutes includin. Nu-Step x 20 mins L5  2. Vigor Gym level 8 x 8 mins   3. Bridges x 20  4. SLR 2 x 10  5. Hip Abduction x 10  6. DKC with Swiss Ball x 3 mins  7. Lumbar rolls x 3 mins  8. Seated Lumbar Flex with SB x 4 mins  9. Scapular Retraction 20 x with black band  10. Wall Slides x 20    JEFFREY received the following supervised modalities after being cleared for contradictions    Home Exercises Provided and Patient Education Provided     Education provided re:   - progress towards goals   - role of therapy in multi - disciplinary team, goals for therapy  No spiritual or educational barriers to learning provided    Written Home Exercises Provided: .  Exercises were reviewed and JEFFREY was able to demonstrate them prior to the end of the session.    JEFFREY demonstrated good  understanding of the education provided.     Assessment   Jeffrey is JEFFREY is progressing well towards his goals. Patient did well with exercises.     Case conference with Jonathan Favre, PTA to discuss the evaluation,  "review the established plan of care, and provide the PTA with the following instructions, if applicable, for the safe and effective treatment of : Elaine Murphy has 2 visits remaining on his current scrip.  He is really more appropriate for Stay-Fit Program for continued care following this.  He needs encouragement to keep up with activity.     Current Progress toward functional measures:  30 sec Chair <>Stand: 6 reps  6 min walk test: able to ambulate 5 mins on level surface. Stops activity secondary to increased LBP and fatigue.   Not enough to change G-Code modifiers.   G-Codes:  Current:: CK        Goal: CJ  Pt prognosis is Good.     Pt will continue to benefit from skilled outpatient physical therapy to address the deficits listed in the problem list box on initial evaluation, provide pt/family education and to maximize pt's level of independence in the home and community environment.     Anticipated barriers to physical therapy: patient's reluctance to be consistent with any lasting exercise program.    Pt's spiritual, cultural and educational needs considered and pt agreeable to plan of care and goals.    Goals:   LTG: ( 4 weeks)  1. Subjective pain no more than 2/10 with daily activity  2. Patient able to complete 8 reps chair <> 30 secs (<20" height)  3. Patient able to demonstrate imrpoved endurance for activity - able to ambulate x 6 mins without increase in symptoms.       Plan   Continue 1 more visit then recommend D/C from skilled PT at tht time.    Jonathan Favre, PTA   "

## 2018-06-12 ENCOUNTER — CLINICAL SUPPORT (OUTPATIENT)
Dept: REHABILITATION | Facility: HOSPITAL | Age: 83
End: 2018-06-12
Payer: MEDICARE

## 2018-06-12 DIAGNOSIS — M62.81 MUSCLE WEAKNESS (GENERALIZED): Primary | ICD-10-CM

## 2018-06-12 PROCEDURE — 97110 THERAPEUTIC EXERCISES: CPT | Mod: PN

## 2018-06-12 NOTE — PROGRESS NOTES
Physical Therapy Daily Treatment Note     Name: Jeffrey Ocasio  [facility] Number: [id number]    Therapy Diagnosis:   Encounter Diagnosis   Name Primary?    Muscle weakness (generalized) Yes     Physician: Cinthia Shrestha MD    Physician Orders: Physical Therapy Eval and Treat for generalized muscle weakness  Medical Diagnosis: gerneralized weakness   Evaluation Date: 18  Authorization period Expiration: 18  Plan of Care Certification Period: 18    Visit #: 12 / Visits authorized: 12  Time In: 1:35  Time Out: 2:45  Total Billable Time: 45 minutes    Precautions: std    Subjective   Pt reports: no new c/o's  Response to previous treatment:  Functional change: see previous progress report to MD.    Pain:  Current 2/10, worst 0/10, best 0/10   Location:     Objective   JEFFREY received therapeutic exercises to develop strength, flexibility, posture and core stabilization for 45 minutes includin. Nu-Step x 20 mins L5  2. Vigor Gym level 8 x 8 mins   3. Bridges x 20  4. SLR 2 x 10  5. Hip Abduction x 10  6. DKC with Swiss Ball x 3 mins  7. Lumbar rolls x 3 mins  8. Seated Lumbar Flex with SB x 4 mins  9. Scapular Retraction 20 x with black band  10. Wall Slides x 20    JEFFREY received the following supervised modalities after being cleared for contradictions    Home Exercises Provided and Patient Education Provided     Education provided re:   - progress towards goals   - role of therapy in multi - disciplinary team, goals for therapy  No spiritual or educational barriers to learning provided    Written Home Exercises Provided: .  Exercises were reviewed and JEFFREY was able to demonstrate them prior to the end of the session.    JEFFREY demonstrated good  understanding of the education provided.     Assessment   Jeffrey VELEZ is progressing well towards his goals. Patient has made good progress since starting PT. Will definitely benefit from continued exercise.    Case  "conference with Jonathan Favre, PTA to discuss the evaluation, review the established plan of care, and provide the PTA with the following instructions, if applicable, for the safe and effective treatment of : Elaine Murphy has 2 visits remaining on his current scrip.  He is really more appropriate for Stay-Fit Program for continued care following this.  He needs encouragement to keep up with activity.     Current Progress toward functional measures:  30 sec Chair <>Stand: 6 reps  6 min walk test: able to ambulate 5 mins on level surface. Stops activity secondary to increased LBP and fatigue.   Not enough to change G-Code modifiers.   G-Codes:  Current:: CK        Goal: CJ  Pt prognosis is Good.     Pt will continue to benefit from skilled outpatient physical therapy to address the deficits listed in the problem list box on initial evaluation, provide pt/family education and to maximize pt's level of independence in the home and community environment.     Anticipated barriers to physical therapy: patient's reluctance to be consistent with any lasting exercise program.    Pt's spiritual, cultural and educational needs considered and pt agreeable to plan of care and goals.    Goals:   LTG: ( 4 weeks)  1. Subjective pain no more than 2/10 with daily activity  2. Patient able to complete 8 reps chair <> 30 secs (<20" height)  3. Patient able to demonstrate imrpoved endurance for activity - able to ambulate x 6 mins without increase in symptoms.       Plan   Recommend D/C     Jonathan Favre, PTA   "

## 2018-10-30 ENCOUNTER — OFFICE VISIT (OUTPATIENT)
Dept: PODIATRY | Facility: CLINIC | Age: 83
End: 2018-10-30
Payer: MEDICARE

## 2018-10-30 VITALS
HEART RATE: 81 BPM | SYSTOLIC BLOOD PRESSURE: 122 MMHG | TEMPERATURE: 97 F | BODY MASS INDEX: 26.69 KG/M2 | WEIGHT: 208 LBS | HEIGHT: 74 IN | DIASTOLIC BLOOD PRESSURE: 66 MMHG

## 2018-10-30 DIAGNOSIS — B35.3 TINEA PEDIS OF BOTH FEET: ICD-10-CM

## 2018-10-30 DIAGNOSIS — G60.9 IDIOPATHIC PERIPHERAL NEUROPATHY: Primary | ICD-10-CM

## 2018-10-30 DIAGNOSIS — L60.0 INGROWN NAIL: ICD-10-CM

## 2018-10-30 PROCEDURE — 99213 OFFICE O/P EST LOW 20 MIN: CPT | Mod: PBBFAC,PN | Performed by: PODIATRIST

## 2018-10-30 PROCEDURE — 99999 PR PBB SHADOW E&M-EST. PATIENT-LVL III: CPT | Mod: PBBFAC,,, | Performed by: PODIATRIST

## 2018-10-30 PROCEDURE — 99213 OFFICE O/P EST LOW 20 MIN: CPT | Mod: S$PBB,,, | Performed by: PODIATRIST

## 2018-10-30 RX ORDER — ELECTROLYTES/DEXTROSE
SOLUTION, ORAL ORAL
COMMUNITY

## 2018-10-30 RX ORDER — ACETAMINOPHEN 500 MG
TABLET ORAL
COMMUNITY

## 2018-10-30 RX ORDER — METHOCARBAMOL 750 MG/1
TABLET, FILM COATED ORAL
COMMUNITY
Start: 2018-09-12 | End: 2019-03-06

## 2018-10-30 RX ORDER — FUROSEMIDE 20 MG/1
TABLET ORAL
COMMUNITY
End: 2019-03-06

## 2018-10-30 RX ORDER — TAMSULOSIN HYDROCHLORIDE 0.4 MG/1
1 CAPSULE ORAL DAILY
Refills: 3 | COMMUNITY
Start: 2018-10-19 | End: 2021-03-26 | Stop reason: SDUPTHER

## 2018-10-30 RX ORDER — NITROGLYCERIN 0.4 MG/1
TABLET SUBLINGUAL
Refills: 2 | COMMUNITY
Start: 2018-10-15 | End: 2019-03-06

## 2018-10-30 RX ORDER — GABAPENTIN 300 MG/1
CAPSULE ORAL
COMMUNITY
End: 2019-03-06

## 2018-10-30 RX ORDER — FINASTERIDE 5 MG/1
5 TABLET, FILM COATED ORAL DAILY
Refills: 3 | COMMUNITY
Start: 2018-08-05 | End: 2021-06-27 | Stop reason: SDUPTHER

## 2018-10-30 RX ORDER — CLOBETASOL PROPIONATE 0.5 MG/G
OINTMENT TOPICAL
COMMUNITY
End: 2019-03-06

## 2018-11-03 PROBLEM — B35.3 TINEA PEDIS OF BOTH FEET: Status: ACTIVE | Noted: 2018-11-03

## 2018-11-03 PROBLEM — L60.0 INGROWN NAIL: Status: ACTIVE | Noted: 2018-11-03

## 2018-11-03 PROBLEM — G60.9 IDIOPATHIC PERIPHERAL NEUROPATHY: Status: ACTIVE | Noted: 2018-11-03

## 2018-11-04 NOTE — PROGRESS NOTES
Subjective:       Patient ID: Jeffrey Ocasio is a 85 y.o. male.    Chief Complaint: Nail Problem; Foot Problem; and Follow-up  Patient presents today he is concerned about ingrowing toenails on his big toes because they have fungus in them he states that they do tend to pinch in [place] a quarter of the toes.patient has a history of previous ingrown toenail with infection and underwent nail avulsion in the past.     HPI  Review of Systems   Musculoskeletal: Positive for arthralgias, back pain and gait problem.   Neurological: Positive for numbness.   All other systems reviewed and are negative.      Objective:      Physical Exam   Constitutional: He appears well-developed and well-nourished.   Cardiovascular:   Pulses:       Dorsalis pedis pulses are 1+ on the right side, and 1+ on the left side.        Posterior tibial pulses are 1+ on the right side, and 1+ on the left side.   Pulmonary/Chest: Effort normal.   Musculoskeletal: He exhibits edema and deformity.        Right foot: There is deformity.        Left foot: There is deformity.   Feet:   Right Foot:   Protective Sensation: 4 sites tested. 1 site sensed.  Left Foot:   Protective Sensation: 4 sites tested. 1 site sensed.  Neurological: He displays abnormal reflex.   Skin: Skin is warm. Capillary refill takes more than 3 seconds. There is erythema.   Psychiatric: He has a normal mood and affect. His behavior is normal. Judgment and thought content normal.   Nursing note and vitals reviewed.    On evaluation patient has areas of previously noted ingrown infected toenail on both big toes there is signs of fungal involvement some of the nail is growing into both the medial and lateral border which raises concern for infection there is some mild discomfort noted upon palpation. Patient does have history of currently displayed signs of interdigital maceration with signs of interdigital fungal involvement. Patient displays findings consistent with previously noted  neuropathy bilateral.    Assessment:       1. Idiopathic peripheral neuropathy    2. Tinea pedis of both feet    3. Ingrown nail        Plan:       Following evaluation patient had several elongated ingrown toenails these were debrided today patient also has neuropathy in both lower extremities. Betadine was applied today and I advised the patient he needs to make sure he dries between his toes well after bathing. Ingrown toenails were removed however these areas also need to be closely monitored bacitracin ointment and a light dressing was applied bilateral hallux. Followup one week. I made a recommendation that the patient start applying Vicks VapoRub to his toenails twice a day every day he was using a prescription antifungal topical solution however he states he doesn't believe it's working very well and have him try the Vicks vapor rub instead.  Patient had concerned because of history of athlete's foot infection interdigital this was much improved today with very little if any interdigital maceration however this needs to be monitored closely especially with the patient's history of neuropathy.  Face-to-face evaluation exam as well as treatment equaled 15 min.

## 2019-03-04 ENCOUNTER — TELEPHONE (OUTPATIENT)
Dept: PODIATRY | Facility: CLINIC | Age: 84
End: 2019-03-04

## 2019-03-04 NOTE — TELEPHONE ENCOUNTER
----- Message from Cyndy Gonzalez sent at 3/4/2019 10:53 AM CST -----  Type: Needs soon appointment    Who Called:  Patient  Best Call Back Number: [phone]  Additional Information: Patient has infection in big toe on right foot/would like to be seen this week/no appointment showing available/please call patient back to schedule or advise.

## 2019-03-06 ENCOUNTER — OFFICE VISIT (OUTPATIENT)
Dept: PODIATRY | Facility: CLINIC | Age: 84
End: 2019-03-06
Payer: MEDICARE

## 2019-03-06 ENCOUNTER — TELEPHONE (OUTPATIENT)
Dept: PODIATRY | Facility: CLINIC | Age: 84
End: 2019-03-06

## 2019-03-06 VITALS
DIASTOLIC BLOOD PRESSURE: 84 MMHG | BODY MASS INDEX: 27.46 KG/M2 | WEIGHT: 214 LBS | HEIGHT: 74 IN | SYSTOLIC BLOOD PRESSURE: 137 MMHG | TEMPERATURE: 98 F | HEART RATE: 63 BPM

## 2019-03-06 DIAGNOSIS — L60.0 INGROWN NAIL: Primary | ICD-10-CM

## 2019-03-06 DIAGNOSIS — L03.031 PARONYCHIA OF GREAT TOE, RIGHT: ICD-10-CM

## 2019-03-06 PROCEDURE — 99999 PR PBB SHADOW E&M-EST. PATIENT-LVL III: CPT | Mod: PBBFAC,,, | Performed by: PODIATRIST

## 2019-03-06 PROCEDURE — 99213 OFFICE O/P EST LOW 20 MIN: CPT | Mod: PBBFAC | Performed by: PODIATRIST

## 2019-03-06 PROCEDURE — 87070 CULTURE OTHR SPECIMN AEROBIC: CPT

## 2019-03-06 PROCEDURE — 87077 CULTURE AEROBIC IDENTIFY: CPT

## 2019-03-06 PROCEDURE — 99214 OFFICE O/P EST MOD 30 MIN: CPT | Mod: S$PBB,,, | Performed by: PODIATRIST

## 2019-03-06 PROCEDURE — 99999 PR PBB SHADOW E&M-EST. PATIENT-LVL III: ICD-10-PCS | Mod: PBBFAC,,, | Performed by: PODIATRIST

## 2019-03-06 PROCEDURE — 87186 SC STD MICRODIL/AGAR DIL: CPT

## 2019-03-06 PROCEDURE — 99214 PR OFFICE/OUTPT VISIT, EST, LEVL IV, 30-39 MIN: ICD-10-PCS | Mod: S$PBB,,, | Performed by: PODIATRIST

## 2019-03-06 RX ORDER — MULTIVITAMIN
1 TABLET ORAL DAILY
COMMUNITY

## 2019-03-06 RX ORDER — ASPIRIN 81 MG/1
81 TABLET ORAL DAILY
COMMUNITY

## 2019-03-06 RX ORDER — DOXYCYCLINE 100 MG/1
CAPSULE ORAL
Refills: 0 | COMMUNITY
Start: 2018-12-03 | End: 2019-03-06

## 2019-03-06 RX ORDER — PRIMIDONE 50 MG/1
100 TABLET ORAL 3 TIMES DAILY
COMMUNITY

## 2019-03-06 RX ORDER — GLUCOSAMINE/CHONDRO SU A 500-400 MG
1 TABLET ORAL 3 TIMES DAILY
COMMUNITY

## 2019-03-06 RX ORDER — OMEPRAZOLE 20 MG/1
20 CAPSULE, DELAYED RELEASE ORAL DAILY
COMMUNITY
End: 2021-01-28

## 2019-03-06 RX ORDER — AMOXICILLIN 500 MG
2 CAPSULE ORAL 2 TIMES DAILY
COMMUNITY

## 2019-03-06 RX ORDER — CYANOCOBALAMIN (VITAMIN B-12) 500 MCG
TABLET ORAL NIGHTLY PRN
COMMUNITY

## 2019-03-06 NOTE — TELEPHONE ENCOUNTER
Nurse explained to pt that he did not receive another persons medication what he received was the solution he is suppose to use to clean the wound, pt verbalized understanding

## 2019-03-06 NOTE — TELEPHONE ENCOUNTER
----- Message from Cyndy Gonzalez sent at 3/6/2019 12:39 PM CST -----  Type: Needs Medical Advice    Who Called:  Patient  Best Call Back Number: [phone]  Additional Information: Patient requesting to speak with nurse concerning acquiring someone else's medication/stated name on medication is someone else/please call back to advise.

## 2019-03-09 LAB — BACTERIA SPEC AEROBE CULT: NORMAL

## 2019-03-09 RX ORDER — SULFAMETHOXAZOLE AND TRIMETHOPRIM 400; 80 MG/1; MG/1
2 TABLET ORAL 2 TIMES DAILY
Qty: 56 TABLET | Refills: 0 | Status: SHIPPED | OUTPATIENT
Start: 2019-03-09 | End: 2019-03-23

## 2019-03-10 NOTE — PROGRESS NOTES
Subjective:       Patient ID: Jeffrey Ocasio is a 86 y.o. male.    Chief Complaint: Foot Problem; Follow-up; Ingrown Toenail; and Nail Problem  Patient presents today he is concerned about ingrowing toenails on his big toes.   Ingrown Toenail   Associated symptoms include arthralgias and numbness.   Nail Problem   Associated symptoms include arthralgias and numbness.     Review of Systems   Musculoskeletal: Positive for arthralgias, back pain and gait problem.   Neurological: Positive for numbness.   All other systems reviewed and are negative.      Objective:      Physical Exam   Constitutional: He appears well-developed and well-nourished.   Cardiovascular:   Pulses:       Dorsalis pedis pulses are 1+ on the right side, and 1+ on the left side.        Posterior tibial pulses are 1+ on the right side, and 1+ on the left side.   Pulmonary/Chest: Effort normal.   Musculoskeletal: He exhibits edema and deformity.        Right foot: There is deformity.        Left foot: There is deformity.   Feet:   Right Foot:   Protective Sensation: 4 sites tested. 1 site sensed.  Left Foot:   Protective Sensation: 4 sites tested. 1 site sensed.  Neurological: He displays abnormal reflex.   Skin: Skin is warm. Capillary refill takes more than 3 seconds. There is erythema.   Psychiatric: He has a normal mood and affect. His behavior is normal. Judgment and thought content normal.   Nursing note and vitals reviewed.    On evaluation patient has areas of previously noted ingrown infected toenail on both big toes there is signs of fungal involvement some of the nail is growing into both the medial and lateral border which raises concern for infection there is some mild discomfort noted upon palpation. Patient does have history of currently displayed signs of interdigital maceration with signs of interdigital fungal involvement. Patient displays findings consistent with previously noted neuropathy bilateral.    Assessment:       1.  Ingrown nail    2. Paronychia of great toe, right        Plan:       Patient presents today stating he has a very painful right great toe patient has a chronic history of infected ingrown toenails on both big toes the inside of his right great toe has been bothering him for about a week.  Following evaluation there was purulent drainage emitting from the area culture and sensitivity was performed subsequent culture and sensitivity was positive for Staph patient was contacted advised to start taking Bactrim as ordered he is going to clean the area with Dakin solution applied gentamicin ointment and a light dressing to the area every day as directed.  Plan follow-up will be 7-10 days patient advised to contact us with any increased redness swelling or pain I was able to debride and remove a large portion of nail from the medial distal border right hallux.  Total face-to-face time including discussion evaluation treatment and removal of ingrowing toenail equaled 25 min.

## 2019-03-11 ENCOUNTER — TELEPHONE (OUTPATIENT)
Dept: PODIATRY | Facility: CLINIC | Age: 84
End: 2019-03-11

## 2019-03-11 NOTE — TELEPHONE ENCOUNTER
----- Message from David Reed DPM sent at 3/9/2019  6:34 PM CST -----  Please call the patient regarding his abnormal result.Advise + staph he is to start bactrim as rx.

## 2019-03-21 ENCOUNTER — OFFICE VISIT (OUTPATIENT)
Dept: PODIATRY | Facility: CLINIC | Age: 84
End: 2019-03-21
Payer: MEDICARE

## 2019-03-21 VITALS
HEIGHT: 74 IN | DIASTOLIC BLOOD PRESSURE: 75 MMHG | BODY MASS INDEX: 27.46 KG/M2 | SYSTOLIC BLOOD PRESSURE: 135 MMHG | WEIGHT: 214 LBS | TEMPERATURE: 97 F | HEART RATE: 62 BPM

## 2019-03-21 DIAGNOSIS — L60.0 INGROWN NAIL: ICD-10-CM

## 2019-03-21 DIAGNOSIS — L03.031 PARONYCHIA OF GREAT TOE, RIGHT: Primary | ICD-10-CM

## 2019-03-21 DIAGNOSIS — G60.9 IDIOPATHIC PERIPHERAL NEUROPATHY: ICD-10-CM

## 2019-03-21 PROCEDURE — 99213 OFFICE O/P EST LOW 20 MIN: CPT | Mod: S$PBB,,, | Performed by: PODIATRIST

## 2019-03-21 PROCEDURE — 99999 PR PBB SHADOW E&M-EST. PATIENT-LVL III: ICD-10-PCS | Mod: PBBFAC,,, | Performed by: PODIATRIST

## 2019-03-21 PROCEDURE — 99999 PR PBB SHADOW E&M-EST. PATIENT-LVL III: CPT | Mod: PBBFAC,,, | Performed by: PODIATRIST

## 2019-03-21 PROCEDURE — 99213 OFFICE O/P EST LOW 20 MIN: CPT | Mod: PBBFAC,PN | Performed by: PODIATRIST

## 2019-03-21 PROCEDURE — 99213 PR OFFICE/OUTPT VISIT, EST, LEVL III, 20-29 MIN: ICD-10-PCS | Mod: S$PBB,,, | Performed by: PODIATRIST

## 2019-03-21 RX ORDER — ESCITALOPRAM OXALATE 10 MG/1
10 TABLET ORAL DAILY
Refills: 3 | COMMUNITY
Start: 2019-03-06

## 2019-03-21 RX ORDER — CIPROFLOXACIN 500 MG/1
1 TABLET ORAL
COMMUNITY
End: 2019-06-04

## 2019-03-21 RX ORDER — GABAPENTIN 300 MG/1
1 CAPSULE ORAL
COMMUNITY

## 2019-03-24 NOTE — PROGRESS NOTES
Subjective:       Patient ID: Jeffrey Ocasio is a 86 y.o. male.    Chief Complaint: Follow-up; Foot Problem; Nail Problem; and Ingrown Toenail  Patient presents today he is concerned about ingrowing toenails on his big toes.   Ingrown Toenail   Associated symptoms include arthralgias and numbness.   Nail Problem   Associated symptoms include arthralgias and numbness.     Review of Systems   Musculoskeletal: Positive for arthralgias, back pain and gait problem.   Neurological: Positive for numbness.   All other systems reviewed and are negative.      Objective:      Physical Exam   Constitutional: He appears well-developed and well-nourished.   Cardiovascular:   Pulses:       Dorsalis pedis pulses are 1+ on the right side, and 1+ on the left side.        Posterior tibial pulses are 1+ on the right side, and 1+ on the left side.   Pulmonary/Chest: Effort normal.   Musculoskeletal: He exhibits edema and deformity.        Right foot: There is deformity.        Left foot: There is deformity.   Feet:   Right Foot:   Protective Sensation: 4 sites tested. 1 site sensed.  Left Foot:   Protective Sensation: 4 sites tested. 1 site sensed.  Neurological: He displays abnormal reflex.   Skin: Skin is warm. Capillary refill takes more than 3 seconds. There is erythema.   Psychiatric: He has a normal mood and affect. His behavior is normal. Judgment and thought content normal.   Nursing note and vitals reviewed.    On evaluation patient has areas of previously noted ingrown infected toenail on both big toes there is signs of fungal involvement some of the nail is growing into both the medial and lateral border which raises concern for infection there is some mild discomfort noted upon palpation. Patient does have history of currently displayed signs of interdigital maceration with signs of interdigital fungal involvement. Patient displays findings consistent with previously noted neuropathy bilateral.    Assessment:       1.  Paronychia of great toe, right    2. Ingrown nail    3. Idiopathic peripheral neuropathy        Plan:       Patient presents today stating he has a very painful right great toe patient has a chronic history of infected ingrown toenails on both big toes the inside of his right great toe has been bothering him for about a week.  Medial border right hallux was evaluated there is a pocket of underlying abscess the patient allowed me to debride this removing a small portion of nail and draining the infection from the area the area was flushed irrigated with copious amounts of sterile saline it is going to be cleaned every day with Dakin solution gentamicin ointment and a light dressing applied plan to follow up with patient 2 weeks any increased redness swelling pain or discomfort patient is to contact me immediately.  Total face-to-face time equaled 15 min.

## 2019-04-04 ENCOUNTER — OFFICE VISIT (OUTPATIENT)
Dept: PODIATRY | Facility: CLINIC | Age: 84
End: 2019-04-04
Payer: MEDICARE

## 2019-04-04 VITALS
SYSTOLIC BLOOD PRESSURE: 116 MMHG | HEART RATE: 80 BPM | HEIGHT: 74 IN | WEIGHT: 214 LBS | BODY MASS INDEX: 27.46 KG/M2 | DIASTOLIC BLOOD PRESSURE: 71 MMHG | TEMPERATURE: 97 F

## 2019-04-04 DIAGNOSIS — L60.0 INGROWN NAIL: ICD-10-CM

## 2019-04-04 DIAGNOSIS — G60.9 IDIOPATHIC PERIPHERAL NEUROPATHY: ICD-10-CM

## 2019-04-04 DIAGNOSIS — L03.031 PARONYCHIA OF GREAT TOE, RIGHT: Primary | ICD-10-CM

## 2019-04-04 PROCEDURE — 99213 OFFICE O/P EST LOW 20 MIN: CPT | Mod: S$PBB,,, | Performed by: PODIATRIST

## 2019-04-04 PROCEDURE — 99213 PR OFFICE/OUTPT VISIT, EST, LEVL III, 20-29 MIN: ICD-10-PCS | Mod: S$PBB,,, | Performed by: PODIATRIST

## 2019-04-04 PROCEDURE — 99213 OFFICE O/P EST LOW 20 MIN: CPT | Mod: PBBFAC,PN | Performed by: PODIATRIST

## 2019-04-04 PROCEDURE — 99999 PR PBB SHADOW E&M-EST. PATIENT-LVL III: ICD-10-PCS | Mod: PBBFAC,,, | Performed by: PODIATRIST

## 2019-04-04 PROCEDURE — 99999 PR PBB SHADOW E&M-EST. PATIENT-LVL III: CPT | Mod: PBBFAC,,, | Performed by: PODIATRIST

## 2019-04-04 RX ORDER — ALCOHOL 2.38 KG/3.79L
GEL TOPICAL
COMMUNITY

## 2019-04-04 NOTE — PROGRESS NOTES
Subjective:       Patient ID: Jeffrey Ocasio is a 86 y.o. male.    Chief Complaint: Follow-up; Ingrown Toenail; Foot Problem; and Nail Problem  Patient presents today he is concerned about ingrowing toenails on his big toes.   Nail Problem   Associated symptoms include arthralgias and numbness.   Ingrown Toenail   Associated symptoms include arthralgias and numbness.     Review of Systems   Musculoskeletal: Positive for arthralgias, back pain and gait problem.   Neurological: Positive for numbness.   All other systems reviewed and are negative.      Objective:      Physical Exam   Constitutional: He appears well-developed and well-nourished.   Cardiovascular:   Pulses:       Dorsalis pedis pulses are 1+ on the right side, and 1+ on the left side.        Posterior tibial pulses are 1+ on the right side, and 1+ on the left side.   Pulmonary/Chest: Effort normal.   Musculoskeletal: He exhibits edema and deformity.        Right foot: There is deformity.        Left foot: There is deformity.   Feet:   Right Foot:   Protective Sensation: 4 sites tested. 1 site sensed.  Left Foot:   Protective Sensation: 4 sites tested. 1 site sensed.  Neurological: He displays abnormal reflex.   Skin: Skin is warm. Capillary refill takes more than 3 seconds. There is erythema.   Psychiatric: He has a normal mood and affect. His behavior is normal. Judgment and thought content normal.   Nursing note and vitals reviewed.    On evaluation patient has areas of previously noted ingrown infected toenail on both big toes there is signs of fungal involvement some of the nail is growing into both the medial and lateral border which raises concern for infection there is some mild discomfort noted upon palpation. Patient does have history of currently displayed signs of interdigital maceration with signs of interdigital fungal involvement. Patient displays findings consistent with previously noted neuropathy bilateral.    Assessment:       1.  Paronychia of great toe, right    2. Ingrown nail    3. Idiopathic peripheral neuropathy        Plan:       Patient presents today stating he has a very painful right great toe patient has a chronic history of infected ingrown toenails on both big toes the inside of his right great toe has been bothering him for about a week.  The medial border of the patient's right hallux looks much better there had been an abscess underlying this area that was drained the redness swelling and discomfort in the area has completely resolved I am going to have him discontinue the antibiotic ointment that he was putting on the area and the dressing I have advised the patient to monitor this very closely he had several other nails that were showing signs of becoming ingrown these were debrided today and trimmed patient states he is doing good I have advised him we need to monitor this any increased redness swelling pain or discomfort he is to contact us immediately otherwise I will plan to see the patient as needed for follow-up.  Total face-to-face time equaled 15 min.

## 2019-06-04 ENCOUNTER — OFFICE VISIT (OUTPATIENT)
Dept: PODIATRY | Facility: CLINIC | Age: 84
End: 2019-06-04
Payer: MEDICARE

## 2019-06-04 VITALS
HEART RATE: 67 BPM | WEIGHT: 214 LBS | DIASTOLIC BLOOD PRESSURE: 68 MMHG | SYSTOLIC BLOOD PRESSURE: 140 MMHG | TEMPERATURE: 98 F | HEIGHT: 74 IN | BODY MASS INDEX: 27.46 KG/M2 | RESPIRATION RATE: 18 BRPM | OXYGEN SATURATION: 97 %

## 2019-06-04 DIAGNOSIS — G60.9 IDIOPATHIC PERIPHERAL NEUROPATHY: ICD-10-CM

## 2019-06-04 DIAGNOSIS — L60.0 INGROWN NAIL: Primary | ICD-10-CM

## 2019-06-04 DIAGNOSIS — L03.031 PARONYCHIA OF GREAT TOE, RIGHT: ICD-10-CM

## 2019-06-04 PROCEDURE — 99999 PR PBB SHADOW E&M-EST. PATIENT-LVL III: ICD-10-PCS | Mod: PBBFAC,,, | Performed by: PODIATRIST

## 2019-06-04 PROCEDURE — 99999 PR PBB SHADOW E&M-EST. PATIENT-LVL III: CPT | Mod: PBBFAC,,, | Performed by: PODIATRIST

## 2019-06-04 PROCEDURE — 99213 PR OFFICE/OUTPT VISIT, EST, LEVL III, 20-29 MIN: ICD-10-PCS | Mod: S$PBB,,, | Performed by: PODIATRIST

## 2019-06-04 PROCEDURE — 99213 OFFICE O/P EST LOW 20 MIN: CPT | Mod: PBBFAC,PN | Performed by: PODIATRIST

## 2019-06-04 PROCEDURE — 99213 OFFICE O/P EST LOW 20 MIN: CPT | Mod: S$PBB,,, | Performed by: PODIATRIST

## 2019-06-04 RX ORDER — ROSUVASTATIN CALCIUM 5 MG/1
5 TABLET, COATED ORAL DAILY
Refills: 1 | COMMUNITY
Start: 2019-04-18

## 2019-06-04 RX ORDER — LEVOTHYROXINE SODIUM 25 UG/1
TABLET ORAL
COMMUNITY
Start: 2019-05-01

## 2019-06-04 RX ORDER — POTASSIUM CHLORIDE 1500 MG/1
TABLET, EXTENDED RELEASE ORAL
Refills: 5 | COMMUNITY
Start: 2019-05-06

## 2019-06-04 RX ORDER — NITROGLYCERIN 0.4 MG/1
TABLET SUBLINGUAL
Refills: 2 | COMMUNITY
Start: 2019-05-07

## 2019-06-04 RX ORDER — FUROSEMIDE 40 MG/1
TABLET ORAL
Refills: 4 | COMMUNITY
Start: 2019-05-06 | End: 2020-05-04

## 2019-06-04 RX ORDER — METHOCARBAMOL 750 MG/1
TABLET, FILM COATED ORAL
COMMUNITY
Start: 2019-05-28 | End: 2021-06-27 | Stop reason: SDUPTHER

## 2019-06-04 NOTE — LETTER
June 7, 2019      Cinthia Shrestha MD  [address]  [place]  [facility] [place] [address]           [facility]  [facility] - Podiatry/Wound Care  [address] [place] [address]  Phone: [phone]  Fax: [phone]          Patient: Jeffrey Ocasio   MR Number: [id number]   YOB: 1933   Date of Visit: 6/4/2019       Dear Dr. Cinthia Shrestha:    Thank you for referring Jeffrey Ocasio to me for evaluation. Attached you will find relevant portions of my assessment and plan of care.    If you have questions, please do not hesitate to call me. I look forward to following Jeffrey Ocasio along with you.    Sincerely,    Delicia Whitney  CC:  No Recipients    If you would like to receive this communication electronically, please contact [email or web address] or [phone] to request more information on "[employer] Link access.    For providers and/or their staff who would like to refer a patient to [facility], please contact us through our one-stop-shop provider referral line, [facility], at [phone].    If you feel you have received this communication in error or would no longer like to receive these types of communications, please e-mail [email or web address]

## 2019-06-06 DIAGNOSIS — R53.1 WEAKNESS: Primary | ICD-10-CM

## 2019-06-06 DIAGNOSIS — R29.898 DEFICIENCIES OF LIMBS: ICD-10-CM

## 2019-06-09 NOTE — PROGRESS NOTES
Subjective:       Patient ID: Jeffrey Ocasio is a 86 y.o. male.    Chief Complaint: Follow-up and Nail Problem  Patient presents today he is concerned about ingrowing toenails on his big toes.   Ingrown Toenail   Associated symptoms include arthralgias and numbness.   Nail Problem   Associated symptoms include arthralgias and numbness.     Review of Systems   Musculoskeletal: Positive for arthralgias, back pain and gait problem.   Neurological: Positive for numbness.   All other systems reviewed and are negative.      Objective:      Physical Exam   Constitutional: He appears well-developed and well-nourished.   Cardiovascular:   Pulses:       Dorsalis pedis pulses are 1+ on the right side, and 1+ on the left side.        Posterior tibial pulses are 1+ on the right side, and 1+ on the left side.   Pulmonary/Chest: Effort normal.   Musculoskeletal: He exhibits edema and deformity.        Right foot: There is deformity.        Left foot: There is deformity.   Feet:   Right Foot:   Protective Sensation: 4 sites tested. 1 site sensed.  Left Foot:   Protective Sensation: 4 sites tested. 1 site sensed.  Neurological: He displays abnormal reflex.   Skin: Skin is warm. Capillary refill takes more than 3 seconds. There is erythema.   Psychiatric: He has a normal mood and affect. His behavior is normal. Judgment and thought content normal.   Nursing note and vitals reviewed.    On evaluation patient has areas of previously noted ingrown infected toenail on both big toes there is signs of fungal involvement some of the nail is growing into both the medial and lateral border which raises concern for infection there is some mild discomfort noted upon palpation. Patient does have history of currently displayed signs of interdigital maceration with signs of interdigital fungal involvement. Patient displays findings consistent with previously noted neuropathy bilateral.    Assessment:       1. Ingrown nail    2. Idiopathic  peripheral neuropathy    3. Paronychia of great toe, right        Plan:       Patient presents today stating he has a very painful right great toe patient has a chronic history of infected ingrown toenails on both big toes the inside of his right great toe has been bothering him for about a week.  The medial border of the patient's right hallux looks much better there had been an abscess underlying this area that was drained the redness swelling and discomfort in the area has completely resolved I am going to have him discontinue the antibiotic ointment that he was putting on the area and the dressing I have advised the patient to monitor this very closely he had several other nails that were showing signs of becoming ingrown these were debrided today and trimmed patient states he is doing good I have advised him we need to monitor this any increased redness swelling pain or discomfort he is to contact us immediately otherwise I will plan to see the patient as needed for follow-up.  Total face-to-face time equaled 15 min.    This note was created using [employer] voice recognition software that occasionally misinterpreted phrases or words.

## 2019-06-12 ENCOUNTER — CLINICAL SUPPORT (OUTPATIENT)
Dept: REHABILITATION | Facility: HOSPITAL | Age: 84
End: 2019-06-12
Payer: MEDICARE

## 2019-06-12 DIAGNOSIS — M54.16 CHRONIC RADICULAR PAIN OF LOWER BACK: Primary | ICD-10-CM

## 2019-06-12 DIAGNOSIS — M62.81 MUSCLE WEAKNESS (GENERALIZED): ICD-10-CM

## 2019-06-12 DIAGNOSIS — G89.29 CHRONIC RADICULAR PAIN OF LOWER BACK: Primary | ICD-10-CM

## 2019-06-12 PROCEDURE — G8979 MOBILITY GOAL STATUS: HCPCS | Mod: CL,PN

## 2019-06-12 PROCEDURE — 97161 PT EVAL LOW COMPLEX 20 MIN: CPT | Mod: PN

## 2019-06-12 PROCEDURE — G8978 MOBILITY CURRENT STATUS: HCPCS | Mod: CL,PN

## 2019-06-12 NOTE — PLAN OF CARE
[facility]  Physical Therapy Initial Evaluation    Name: Jeffrey Ocasio  Clinic Number: [id number]    Therapy Diagnosis:   Encounter Diagnoses   Name Primary?    Muscle weakness (generalized)     Chronic radicular pain of lower back Yes     Physician: Cinthia Shrestha MD    Physician Orders: PT Eval and Treat   Medical Diagnosis: Chronic radicular lumbar pain with generalized muscle weakness in LE's   Evaluation Date: 6/12/2019  Authorization period Expiration: 12/31/2019  Plan of Care Certification Period: 9/12/2019    Visit #: 1/ Visits authorized: 12  Time In:9:00 AM   Time Out: 10:00 AM   Total Billable Time: 50 minutes    Precautions: Standard and Fall    Subjective   Date of onset: Chronic, recent testing on 5/9/2019   Date of Surgery: N/A     Past Medical History:   Diagnosis Date    Allergy     Asthma      Jeffrey Ocasio  has a past surgical history that includes Knee surgery.    Jeffrey has a current medication list which includes the following prescription(s): aspirin, biotin, cholecalciferol (vitamin d3), escitalopram oxalate, finasteride, fish oil-omega-3 fatty acids, furosemide, gabapentin, glucosamine-chondroitin, klor-con m20, gllzchxed-[id number]-algal oil, melatonin, methocarbamol, multivitamin, nitroglycerin, omeprazole, primidone, rosuvastatin, synthroid, and tamsulosin.    Review of patient's allergies indicates:   Allergen Reactions    Grass pollen         Imaging, MRI studies: Lumbar Spine   Impression:  Moderate to severe central stenotic changes of the lumbar spine at L1-L2 and L3-L4. Moderate appearing central stenosis at L2-L3 and L4-L5. Mild appearing central stenosis at L5-S1  Multilevel facet degenerative changes  Multilevel mild to moderate foraminal stenotic changes appearing greatest on the left at L4-L5    Bone Density:  Low bone mass femoral head and lumbar spine T-score -2.5    Prior Therapy: Jeffrey was seen a year ago for therapy for general  weakness   Social History: Patient lives in a single level home with 1 steps to enter ; ;   Occupation: retired    Prior Level of Function: Sedentary but ambulatory and able to perform Independent daily activities   Current Level of Function: Same       Pain:  Current 4/10, worst 5/10, best 2/10   Location: shoulder  Right; Bilateral LE's from neuropathy   Description: Aching  Aggravating Factors: reaching out and behind with right arm; hypersensitive on lower legs to touch (neuropathy)    Easing Factors: rest       Onset/MAGO: gradual - Jeffrey has been seen in therapy off and on for many years - S/P TKA, Shoulder pain, cervcial pain, lumbar pain and general weakness.     History of current condition - JEFFREY reports: long history of symptoms, including general weakness in LE's; chronic neck and back pain; sedentary lifestyle.  Jeffrey asked his MD for a script to therapy as he just doesn't exercise at home and knows that he needs to do better.  He stated that he was tested for Parkinson's - negative; he does have essential tremors; he also stated that he was tested for Alzheimers - does not have this, just has difficulty with his recall and word-finding difficulty.     Pts goals: To improve my stamina and strength         Objective   Posture:  slouched posture, forward hear, kyphosis; forward flexion from waist with walking and stationary standing unless cued to do so otherwise.     Gait: now using a straight cane with quad point. He has a tendency to slide his right foot as opposed to consistently clearing it on level surface.   He was able to ambualte a total of 700ft in 7 mins for a gait speed of 1.67ft/sec.     Range of Motion:   Cervical - loss of extension noted; rotation to 60 degrees  Thoracic:  Loss of extension; Rotation to 25 degrees on right/left  Lumbar: Loss of flexion and extension, but functional;   UE: WFL  LE: WFL     Upper Extremity Strength  (R) UE  (L) UE    Shoulder flexion: 4-/5  "Shoulder flexion: 4/5   Shoulder Abduction: 4-/5 Shoulder abduction: 4/5   Shoulder ER 3+/5 Shoulder ER 4-/5   Shoulder IR 4-/5 Shoulder IR 4/5   Elbow Flexion 4+/5 Elbow Flexion 4+/5   Elbow Extension 4/5 Elbow Extension 4/5   Wrist Flexion 4/5 Wrist Flexion 4/5   Wrist Extension 4/5 Wrist Extension 4/5      equal right and left     Lower Extremity Strength  Right LE  Left LE    Knee extension: 4/5 Knee extension: 4+/5   Knee flexion: 4-/5 Knee flexion: 4-/5   Hip flexion: 4/5 Hip flexion: 4/5   Hip extension:  4-/5 Hip extension: 4-/5   Hip abduction: 3+/5 Hip abduction: 3+/5   Hip adduction: 3+/5 Hip adduction 3+/5   Ankle dorsiflexion: 3+/5 Ankle dorsiflexion: 4-/5   Ankle plantarflexion: 3/5 Ankle plantarflexion: 3/5       Sensation: intact to light touch, but does not bilateral peripheral neuropathy     30 sec Sit <>Stand from 24" height:  8 reps; unable to rise from lower surface without the use of his UE's.      PT Evaluation Completed? Yes  Discussed Plan of Care with patient: Yes  Home Exercises and Patient Education Provided    Education provided re:   - progress towards goals   - role of therapy in multi - disciplinary team, goals for therapy  Pt educated on condition, POC, and expectations in therapy.  No spiritual or educational barriers to learning provided    Home exercises:  Pt will be provided HEP during course of treatment with progressions as appropriate. Pt was advised to perform these exercises free of pain, and to stop performing them if pain occurs.   JEFFREY demonstrated good  understanding of the education provided.       Functional Limitations Reports - G Codes  Category: Mobility, Body position, Carrying, Self care, Other  Tool: LEFS  Score: 75% limitation   Current:CL = least 60% but < 80% impaired, limited or restricted  Goal:CL = least 60% but < 80% impaired, limited or restricted      Assessment   Jeffrey is a 86 y.o. male referred to outpatient physical therapy and presents to PT " "with generalized weakness secondary to chronic neck/back stenosis . Patient demonstrates limitations as described in the problem list. Pt will benefit from physcial therapy services in order to maximize pain free and/or functional use of bilateral LE's . The following goals were discussed with the patient and patient is in agreement with them as to be addressed in the treatment plan.   Pt prognosis is Good.   Pt will benefit from skilled outpatient Physical Therapy to address the deficits stated above and in the chart below, provide pt/family education, and to maximize pt's level of independence.     Plan of care discussed with patient: Yes  Pt's spiritual, cultural and educational needs considered and pt agreeable to plan of care and goals as stated below:     Anticipated Barriers for therapy: none    Medical necessity is demonstrated by the following IMPAIRMENTS/PROBLEM LIST:    weakness, impaired endurance, impaired functional mobility, gait instability, decreased lower extremity function, decreased ROM, impared cardiopulmonary response to activity, impaired joint extensibility and impaired muscle length    GOALS:     Long Term Goals: 6 weeks  Pain: Decrease pain to no more than 2/10 to allow for improved ability to perform daily activities   Strength: Improve strength in core muscles to at least 3+/5 for improved lumbopelvic stability  Functional scale: Improve score on LEFS to 65% limitation   Walking: Increase walking distance/duration to 1000ft  without pain  Postures: Increase sitting and/or standing duration to 30 mins without pain   Transfers: Perform Sit to Stand transfers - 30 secs x 10 reps from 24" height   Exercise: demonstrate independence with home exercise program to maintain gains made in therapy.          Plan   Certification Period: 6/12/2019 to 9/12/2019.    Outpatient physical therapy 2 times weekly to include: Neuromuscular Re-ed, Patient Education and Therapeutic Exercise. Cont PT for 2 " months.   Pt may be seen by PTA as part of the rehabilitation team.     I certify the need for these services furnished under this plan of treatment and while under my care.    Dena Bauman, PT        Attestation:   I have seen the patient, reviewed the therapist's plan of care, and I agree with the plan of care.   I certify the need for these services furnished under this plan of treatment and while under my care.         _______________            ________                                               _____________________  Physician/Referring Practitioner                                                            Date of Signature

## 2019-06-18 ENCOUNTER — CLINICAL SUPPORT (OUTPATIENT)
Dept: REHABILITATION | Facility: HOSPITAL | Age: 84
End: 2019-06-18
Payer: MEDICARE

## 2019-06-18 DIAGNOSIS — M54.16 CHRONIC RADICULAR PAIN OF LOWER BACK: Primary | ICD-10-CM

## 2019-06-18 DIAGNOSIS — M62.81 MUSCLE WEAKNESS (GENERALIZED): ICD-10-CM

## 2019-06-18 DIAGNOSIS — G89.29 CHRONIC RADICULAR PAIN OF LOWER BACK: Primary | ICD-10-CM

## 2019-06-18 PROCEDURE — 97110 THERAPEUTIC EXERCISES: CPT | Mod: PN

## 2019-06-18 NOTE — PROGRESS NOTES
Physical Therapy Daily Note     Name: Jeffrey Ocasio  [facility] Number: [id number]  Diagnosis:   Encounter Diagnoses   Name Primary?    Chronic radicular pain of lower back Yes    Muscle weakness (generalized)      Physician: Cinthia Shrestha MD  Precautions: Standard  Visit #: 2 of 12  PTA Visit #: 1  Time In: 2:00 PM  Time Out: 3:05 PM    Subjective     Pt reports: No new c/o's  Pain Scale: Jeffrey rates pain on a scale of 0-10 to be 2 currently.    Objective     Jeffrey received individual therapeutic exercises to develop strength and endurance for 45 minutes includin. Nu-Step x 25 mins L5  2. Vigor Gym level 8 x 8 mins   3. Bridges x 15  4. SLR 2 x 10  5. Hip Abduction x 10  6. DKC with Swiss Ball x 3 mins  7. Lumbar rolls x 3 mins  8. Seated Lumbar Flex with SB x 4 mins  9. Scapular Retraction 20 x with black band  10. Wall Slides x 20  11. Toe Taps x 2 mins  12. Side Steps x 2 mins      Jeffrey received the following manual therapy techniques:  were applied to the:  for  minutes including:       The patient received the following direct contact modalities after being cleared for contraindications:     The patient received the following supervised modalities after being cleared for contradictions:     Written Home Exercises Provided:   Pt demo good understanding of the education provided. Jeffrey demonstrated good return demonstration of activities.     Education provided re:  Jeffrey verbalized good understanding of education provided.   No spiritual or educational barriers to learning provided    Assessment     Patient did fairly well with exercises, several rest breaks needed during session; progress as tolerated to improve strength and endurance.  This is a 86 y.o. male referred to outpatient physical therapy and presents with a medical diagnosis of generalized weakness secondary to chronic neck/back stenosis .  and demonstrates limitations as described in  "the problem list. Pt prognosis is Good. Pt will continue to benefit from skilled outpatient physical therapy to address the deficits listed in the problem list, provide pt/family education and to maximize pt's level of independence in the home and community environment.     LONG TERM GOALS:  Long Term Goals: 6 weeks  Pain: Decrease pain to no more than 2/10 to allow for improved ability to perform daily activities   Strength: Improve strength in core muscles to at least 3+/5 for improved lumbopelvic stability  Functional scale: Improve score on LEFS to 65% limitation   Walking: Increase walking distance/duration to 1000ft  without pain  Postures: Increase sitting and/or standing duration to 30 mins without pain   Transfers: Perform Sit to Stand transfers - 30 secs x 10 reps from 24" height   Exercise: demonstrate independence with home exercise program to maintain gains made in therapy.       Plan     Continue with established Plan of Care towards PT goals.    Therapist: Jonathan Favre, PTA  6/18/2019  "

## 2019-06-20 ENCOUNTER — CLINICAL SUPPORT (OUTPATIENT)
Dept: REHABILITATION | Facility: HOSPITAL | Age: 84
End: 2019-06-20
Payer: MEDICARE

## 2019-06-20 DIAGNOSIS — M54.16 CHRONIC RADICULAR PAIN OF LOWER BACK: Primary | ICD-10-CM

## 2019-06-20 DIAGNOSIS — M62.81 MUSCLE WEAKNESS (GENERALIZED): ICD-10-CM

## 2019-06-20 DIAGNOSIS — G89.29 CHRONIC RADICULAR PAIN OF LOWER BACK: Primary | ICD-10-CM

## 2019-06-20 PROCEDURE — 97110 THERAPEUTIC EXERCISES: CPT | Mod: PN

## 2019-06-20 NOTE — PROGRESS NOTES
Physical Therapy Daily Note     Name: Jeffrey Ocasio  [facility] Number: [id number]  Diagnosis:   Encounter Diagnoses   Name Primary?    Chronic radicular pain of lower back Yes    Muscle weakness (generalized)      Physician: Cinthia Shrestha MD  Precautions: Standard  Visit #: 3 of 12  PTA Visit #: 1  Time In: 2:00 PM  Time Out: 3:15 PM    Subjective     Pt reports: No new c/o's  Pain Scale: Jeffrey rates pain on a scale of 0-10 to be 5 currently.    Objective     Jeffrey received individual therapeutic exercises to develop strength and endurance for 45 minutes includin. Nu-Step x 25 mins L5  2. Vigor Gym level 8 x 8 mins   3. Bridges x 15  4. SLR 10 x 2  5. Hip Abduction x 10  6. DKC with Swiss Ball x 3 mins  7. Lumbar rolls x 3 mins  8. Seated Lumbar Flex with SB x 4 mins  9. Scapular Retraction 20 x with black band  10. Wall Slides x 20  11. Toe Taps x 2 mins  12. Side Steps x 2 mins      Jeffrey received the following manual therapy techniques:  were applied to the:  for  minutes including:       The patient received the following direct contact modalities after being cleared for contraindications:     The patient received the following supervised modalities after being cleared for contradictions:     Written Home Exercises Provided:   Pt demo good understanding of the education provided. Jeffrey demonstrated good return demonstration of activities.     Education provided re:  Jeffrey verbalized good understanding of education provided.   No spiritual or educational barriers to learning provided    Assessment     Patient did fairly well with exercises, several rest breaks needed during session; progress as tolerated to improve strength and endurance.  This is a 86 y.o. male referred to outpatient physical therapy and presents with a medical diagnosis of generalized weakness secondary to chronic neck/back stenosis .  and demonstrates limitations as described in  "the problem list. Pt prognosis is Good. Pt will continue to benefit from skilled outpatient physical therapy to address the deficits listed in the problem list, provide pt/family education and to maximize pt's level of independence in the home and community environment.     LONG TERM GOALS:  Long Term Goals: 6 weeks  Pain: Decrease pain to no more than 2/10 to allow for improved ability to perform daily activities   Strength: Improve strength in core muscles to at least 3+/5 for improved lumbopelvic stability  Functional scale: Improve score on LEFS to 65% limitation   Walking: Increase walking distance/duration to 1000ft  without pain  Postures: Increase sitting and/or standing duration to 30 mins without pain   Transfers: Perform Sit to Stand transfers - 30 secs x 10 reps from 24" height   Exercise: demonstrate independence with home exercise program to maintain gains made in therapy.       Plan     Continue with established Plan of Care towards PT goals.    Therapist: Jonathan Favre, PTA  6/20/2019  "

## 2019-06-25 ENCOUNTER — CLINICAL SUPPORT (OUTPATIENT)
Dept: REHABILITATION | Facility: HOSPITAL | Age: 84
End: 2019-06-25
Payer: MEDICARE

## 2019-06-25 DIAGNOSIS — G89.29 CHRONIC RADICULAR PAIN OF LOWER BACK: Primary | ICD-10-CM

## 2019-06-25 DIAGNOSIS — M54.16 CHRONIC RADICULAR PAIN OF LOWER BACK: Primary | ICD-10-CM

## 2019-06-25 DIAGNOSIS — M62.81 MUSCLE WEAKNESS (GENERALIZED): ICD-10-CM

## 2019-06-25 PROCEDURE — 97110 THERAPEUTIC EXERCISES: CPT | Mod: PN

## 2019-06-25 NOTE — PROGRESS NOTES
Physical Therapy Daily Note     Name: Jeffrey Ocasio  [facility] Number: [id number]  Diagnosis:   Encounter Diagnoses   Name Primary?    Chronic radicular pain of lower back Yes    Muscle weakness (generalized)      Physician: Cinthia Shrestha MD  Precautions: Standard  Visit #: 4 of 12  PTA Visit #: 3  Time In: 2:00 PM  Time Out: 3:05 PM    Subjective     Pt reports: No new c/o's  Pain Scale: Jeffrey rates pain on a scale of 0-10 to be 0 currently.    Objective     Jeffrey received individual therapeutic exercises to develop strength and endurance for 45 minutes includin. Nu-Step x 25 mins L5  2. Vigor Gym level 8 x 8 mins   3. Bridges x 15  4. SLR 10 x 2  5. Hip Abduction x 10  6. DKC with Swiss Ball x 3 mins  7. Lumbar rolls x 3 mins  8. Seated Lumbar Flex with SB x 4 mins  9. Scapular Retraction 20 x with black band  10. Wall Slides x 20  11. Toe Taps x 2 mins  12. Side Steps x 2 mins      Jeffrey received the following manual therapy techniques:  were applied to the:  for  minutes including:       The patient received the following direct contact modalities after being cleared for contraindications:     The patient received the following supervised modalities after being cleared for contradictions:     Written Home Exercises Provided:   Pt demo good understanding of the education provided. Jeffrey demonstrated good return demonstration of activities.     Education provided re:  Jeffrey verbalized good understanding of education provided.   No spiritual or educational barriers to learning provided    Assessment     Patient did fairly well with exercises, several rest breaks needed during session; progress as tolerated to improve strength and endurance.  This is a 86 y.o. male referred to outpatient physical therapy and presents with a medical diagnosis of generalized weakness secondary to chronic neck/back stenosis .  and demonstrates limitations as described in  "the problem list. Pt prognosis is Good. Pt will continue to benefit from skilled outpatient physical therapy to address the deficits listed in the problem list, provide pt/family education and to maximize pt's level of independence in the home and community environment.     LONG TERM GOALS:  Long Term Goals: 6 weeks  Pain: Decrease pain to no more than 2/10 to allow for improved ability to perform daily activities   Strength: Improve strength in core muscles to at least 3+/5 for improved lumbopelvic stability  Functional scale: Improve score on LEFS to 65% limitation   Walking: Increase walking distance/duration to 1000ft  without pain  Postures: Increase sitting and/or standing duration to 30 mins without pain   Transfers: Perform Sit to Stand transfers - 30 secs x 10 reps from 24" height   Exercise: demonstrate independence with home exercise program to maintain gains made in therapy.       Plan     Continue with established Plan of Care towards PT goals.    Therapist: Jonathan Favre, PTA  6/25/2019  "

## 2019-06-27 ENCOUNTER — CLINICAL SUPPORT (OUTPATIENT)
Dept: REHABILITATION | Facility: HOSPITAL | Age: 84
End: 2019-06-27
Payer: MEDICARE

## 2019-06-27 DIAGNOSIS — G89.29 CHRONIC RADICULAR PAIN OF LOWER BACK: Primary | ICD-10-CM

## 2019-06-27 DIAGNOSIS — M54.16 CHRONIC RADICULAR PAIN OF LOWER BACK: Primary | ICD-10-CM

## 2019-06-27 DIAGNOSIS — M62.81 MUSCLE WEAKNESS (GENERALIZED): ICD-10-CM

## 2019-06-27 PROCEDURE — 97110 THERAPEUTIC EXERCISES: CPT | Mod: PN

## 2019-06-27 NOTE — PROGRESS NOTES
Physical Therapy Daily Note     Name: Jeffrey Ocasio  [facility] Number: [id number]  Diagnosis:   Encounter Diagnoses   Name Primary?    Chronic radicular pain of lower back Yes    Muscle weakness (generalized)      Physician: Cinthia Shrestha MD  Precautions: Standard  Visit #: 5 of 12  PTA Visit #: 4  Time In: 2:00 PM  Time Out: 3:00 PM    Subjective     Pt reports: No new c/o's  Pain Scale: Jeffrey rates pain on a scale of 0-10 to be 0 currently.    Objective     Jeffrey received individual therapeutic exercises to develop strength and endurance for 45 minutes includin. Nu-Step x 20 mins L5  2. Vigor Gym level 8 x 8 mins   3. Bridges x 15  4. SLR 10 x 2  5. Hip Abduction x 10  6. DKC with Swiss Ball x 3 mins  7. Lumbar rolls x 3 mins  8. Seated Lumbar Flex with SB x 4 mins  9. Scapular Retraction 20 x with black band  10. Wall Slides x 20  11. Toe Taps x 2 mins  12. Side Steps x 2 mins      Jeffrey received the following manual therapy techniques:  were applied to the:  for  minutes including:       The patient received the following direct contact modalities after being cleared for contraindications:     The patient received the following supervised modalities after being cleared for contradictions:     Written Home Exercises Provided:   Pt demo good understanding of the education provided. Jeffrey demonstrated good return demonstration of activities.     Education provided re:  Jeffrey verbalized good understanding of education provided.   No spiritual or educational barriers to learning provided    Assessment     Patient did fairly well with exercises, several rest breaks needed during session; progress as tolerated to improve strength and endurance.  This is a 86 y.o. male referred to outpatient physical therapy and presents with a medical diagnosis of generalized weakness secondary to chronic neck/back stenosis .  and demonstrates limitations as described in  "the problem list. Pt prognosis is Good. Pt will continue to benefit from skilled outpatient physical therapy to address the deficits listed in the problem list, provide pt/family education and to maximize pt's level of independence in the home and community environment.     LONG TERM GOALS:  Long Term Goals: 6 weeks  Pain: Decrease pain to no more than 2/10 to allow for improved ability to perform daily activities   Strength: Improve strength in core muscles to at least 3+/5 for improved lumbopelvic stability  Functional scale: Improve score on LEFS to 65% limitation   Walking: Increase walking distance/duration to 1000ft  without pain  Postures: Increase sitting and/or standing duration to 30 mins without pain   Transfers: Perform Sit to Stand transfers - 30 secs x 10 reps from 24" height   Exercise: demonstrate independence with home exercise program to maintain gains made in therapy.       Plan     Continue with established Plan of Care towards PT goals.    Therapist: Jonathan Favre, PTA  6/27/2019  "

## 2019-07-01 ENCOUNTER — CLINICAL SUPPORT (OUTPATIENT)
Dept: REHABILITATION | Facility: HOSPITAL | Age: 84
End: 2019-07-01
Payer: MEDICARE

## 2019-07-01 DIAGNOSIS — M54.16 CHRONIC RADICULAR PAIN OF LOWER BACK: Primary | ICD-10-CM

## 2019-07-01 DIAGNOSIS — M62.81 MUSCLE WEAKNESS (GENERALIZED): ICD-10-CM

## 2019-07-01 DIAGNOSIS — G89.29 CHRONIC RADICULAR PAIN OF LOWER BACK: Primary | ICD-10-CM

## 2019-07-01 PROCEDURE — 97110 THERAPEUTIC EXERCISES: CPT | Mod: PN

## 2019-07-01 NOTE — PROGRESS NOTES
Physical Therapy Daily Note     Name: Jeffrey Ocasio  [facility] Number: [id number]  Diagnosis:   Encounter Diagnoses   Name Primary?    Chronic radicular pain of lower back Yes    Muscle weakness (generalized)      Physician: Cinthia Shrestha MD  Precautions: Standard  Visit #: 6 of 12  PTA Visit #: 5  Time In: 1:45 PM  Time Out: 3:00 PM    Subjective     Pt reports: No new c/o's  Pain Scale: Jeffrey rates pain on a scale of 0-10 to be 0 currently.    Objective     Jeffrey received individual therapeutic exercises to develop strength and endurance for 45 minutes includin. Nu-Step x 20 mins L5  2. Vigor Gym level 8 x 8 mins   3. Bridges x 15  4. SLR 10 x 2  5. Hip Abduction x 10  6. DKC with Swiss Ball x 3 mins  7. Lumbar rolls x 3 mins  8. Seated Lumbar Flex with SB x 4 mins  9. Scapular Retraction 20 x with black band  10. Wall Slides x 20  11. Toe Taps x 2 mins  12. Side Steps x 2 mins      Jeffrey received the following manual therapy techniques:  were applied to the:  for  minutes including:       The patient received the following direct contact modalities after being cleared for contraindications:     The patient received the following supervised modalities after being cleared for contradictions:     Written Home Exercises Provided:   Pt demo good understanding of the education provided. Jeffrey demonstrated good return demonstration of activities.     Education provided re:  Jeffrey verbalized good understanding of education provided.   No spiritual or educational barriers to learning provided    Assessment     Patient did fairly well with exercises, several rest breaks needed during session; progress as tolerated to improve strength and endurance.  This is a 86 y.o. male referred to outpatient physical therapy and presents with a medical diagnosis of generalized weakness secondary to chronic neck/back stenosis .  and demonstrates limitations as described in  "the problem list. Pt prognosis is Good. Pt will continue to benefit from skilled outpatient physical therapy to address the deficits listed in the problem list, provide pt/family education and to maximize pt's level of independence in the home and community environment.     LONG TERM GOALS:  Long Term Goals: 6 weeks  Pain: Decrease pain to no more than 2/10 to allow for improved ability to perform daily activities   Strength: Improve strength in core muscles to at least 3+/5 for improved lumbopelvic stability  Functional scale: Improve score on LEFS to 65% limitation   Walking: Increase walking distance/duration to 1000ft  without pain  Postures: Increase sitting and/or standing duration to 30 mins without pain   Transfers: Perform Sit to Stand transfers - 30 secs x 10 reps from 24" height   Exercise: demonstrate independence with home exercise program to maintain gains made in therapy.       Plan     Continue with established Plan of Care towards PT goals.    Therapist: Jonathan Favre, PTA  7/1/2019  "

## 2019-07-03 ENCOUNTER — CLINICAL SUPPORT (OUTPATIENT)
Dept: REHABILITATION | Facility: HOSPITAL | Age: 84
End: 2019-07-03
Payer: MEDICARE

## 2019-07-03 DIAGNOSIS — M54.16 CHRONIC RADICULAR PAIN OF LOWER BACK: Primary | ICD-10-CM

## 2019-07-03 DIAGNOSIS — M62.81 MUSCLE WEAKNESS (GENERALIZED): ICD-10-CM

## 2019-07-03 DIAGNOSIS — G89.29 CHRONIC RADICULAR PAIN OF LOWER BACK: Primary | ICD-10-CM

## 2019-07-03 PROCEDURE — 97110 THERAPEUTIC EXERCISES: CPT | Mod: PN

## 2019-07-03 NOTE — PROGRESS NOTES
Physical Therapy Daily Note     Name: Jeffrey Ocasio  [facility] Number: [id number]  Diagnosis:   Encounter Diagnoses   Name Primary?    Chronic radicular pain of lower back Yes    Muscle weakness (generalized)      Physician: Cinthia Shrestha MD  Precautions: Standard  Visit #: 7  12  PTA Visit #: 6  Time In: 2:00 PM  Time Out: 3:10 PM    Subjective     Pt reports: No new c/o's  Pain Scale: Jeffrey rates pain on a scale of 0-10 to be 0 currently.    Objective     Jeffrey received individual therapeutic exercises to develop strength and endurance for 45 minutes includin. Nu-Step x 30 mins L5  2. Vigor Gym level 8 x 8 mins   3. Bridges x 15  4. SLR 10 x 2  5. Hip Abduction x 10  6. DKC with Swiss Ball x 3 mins  7. Lumbar rolls x 3 mins  8. Seated Lumbar Flex with SB x 4 mins  9. Scapular Retraction 30 x with black band  10. Wall Slides x 30  11. Toe Taps x 2 mins  12. Side Steps x 2 mins      Jeffrey received the following manual therapy techniques:  were applied to the:  for  minutes including:       The patient received the following direct contact modalities after being cleared for contraindications:     The patient received the following supervised modalities after being cleared for contradictions:     Written Home Exercises Provided:   Pt demo good understanding of the education provided. Jeffrey demonstrated good return demonstration of activities.     Education provided re:  Jeffrey verbalized good understanding of education provided.   No spiritual or educational barriers to learning provided    Assessment     Patient did fairly well with exercises, several rest breaks needed during session; progress as tolerated to improve strength and endurance.  This is a 86 y.o. male referred to outpatient physical therapy and presents with a medical diagnosis of generalized weakness secondary to chronic neck/back stenosis .  and demonstrates limitations as described in  "the problem list. Pt prognosis is Good. Pt will continue to benefit from skilled outpatient physical therapy to address the deficits listed in the problem list, provide pt/family education and to maximize pt's level of independence in the home and community environment.     LONG TERM GOALS:  Long Term Goals: 6 weeks  Pain: Decrease pain to no more than 2/10 to allow for improved ability to perform daily activities   Strength: Improve strength in core muscles to at least 3+/5 for improved lumbopelvic stability  Functional scale: Improve score on LEFS to 65% limitation   Walking: Increase walking distance/duration to 1000ft  without pain  Postures: Increase sitting and/or standing duration to 30 mins without pain   Transfers: Perform Sit to Stand transfers - 30 secs x 10 reps from 24" height   Exercise: demonstrate independence with home exercise program to maintain gains made in therapy.       Plan     Continue with established Plan of Care towards PT goals.    Therapist: Jonathan Favre, PTA  7/3/2019  "

## 2019-07-09 ENCOUNTER — CLINICAL SUPPORT (OUTPATIENT)
Dept: REHABILITATION | Facility: HOSPITAL | Age: 84
End: 2019-07-09
Payer: MEDICARE

## 2019-07-09 DIAGNOSIS — G89.29 CHRONIC RADICULAR PAIN OF LOWER BACK: Primary | ICD-10-CM

## 2019-07-09 DIAGNOSIS — M54.16 CHRONIC RADICULAR PAIN OF LOWER BACK: Primary | ICD-10-CM

## 2019-07-09 PROCEDURE — 97110 THERAPEUTIC EXERCISES: CPT | Mod: PN

## 2019-07-09 NOTE — PROGRESS NOTES
Physical Therapy Daily Note     Name: Jeffrey Ocasio  [facility] Number: [id number]  Diagnosis:   Encounter Diagnosis   Name Primary?    Chronic radicular pain of lower back Yes     Physician: Cinthia Shrestha MD  Precautions: Standard  Visit #: 8   PTA Visit #: 1  Time In: 2:00 PM  Time Out: 3:15 PM    Subjective     Pt reports: No new c/o's  Pain Scale: Jeffrey rates pain on a scale of 0-10 to be 0 currently.    Objective     Jeffrey received individual therapeutic exercises to develop strength and endurance for 45 minutes includin. Nu-Step x 30 mins L5  2. Vigor Gym level 8 x 8 mins   3. Bridges x 15  4. SLR 10 x 2  5. Hip Abduction x 10  6. DKC with Swiss Ball x 3 mins  7. Lumbar rolls x 3 mins  8. Seated Lumbar Flex with SB x 4 mins  9. Scapular Retraction 30 x with black band  10. Wall Slides x 30  11. Toe Taps x 2 mins  12. Side Steps x 2 mins      Jeffrey received the following manual therapy techniques:  were applied to the:  for  minutes including:       The patient received the following direct contact modalities after being cleared for contraindications:     The patient received the following supervised modalities after being cleared for contradictions:     Written Home Exercises Provided:   Pt demo good understanding of the education provided. Jeffrey demonstrated good return demonstration of activities.     Education provided re:  Jeffrey verbalized good understanding of education provided.   No spiritual or educational barriers to learning provided    Assessment     Patient did fairly well with exercises, several rest breaks needed during session; progress as tolerated to improve strength and endurance.  This is a 86 y.o. male referred to outpatient physical therapy and presents with a medical diagnosis of generalized weakness secondary to chronic neck/back stenosis .  and demonstrates limitations as described in the problem list. Pt prognosis is  "Good. Pt will continue to benefit from skilled outpatient physical therapy to address the deficits listed in the problem list, provide pt/family education and to maximize pt's level of independence in the home and community environment.     LONG TERM GOALS:  Long Term Goals: 6 weeks  Pain: Decrease pain to no more than 2/10 to allow for improved ability to perform daily activities   Strength: Improve strength in core muscles to at least 3+/5 for improved lumbopelvic stability  Functional scale: Improve score on LEFS to 65% limitation   Walking: Increase walking distance/duration to 1000ft  without pain  Postures: Increase sitting and/or standing duration to 30 mins without pain   Transfers: Perform Sit to Stand transfers - 30 secs x 10 reps from 24" height   Exercise: demonstrate independence with home exercise program to maintain gains made in therapy.       Plan     Continue with established Plan of Care towards PT goals.    Therapist: Jonathan Favre, PTA  7/9/2019  "

## 2019-07-10 NOTE — PROGRESS NOTES
Supervisory visit with Jonathan Favre, PTA and patient.  Jeffrey has com pelted 8 of initial 12 visits thus far.  He is tolerating current activity well - requires frequent cueing for posture during standing activities as well as rest breaks due to fatigue.  He is aware of the fact that his sedentary lifestyle over the past 10 years has created mobility deficits.  He will definitely benefit from continued Skilled Physical Therapy at this point, but will eventually have to take control of his own program and continue with activity at an independent level.     Continue with current program and progress activity level as patient tolerates.

## 2019-07-11 ENCOUNTER — CLINICAL SUPPORT (OUTPATIENT)
Dept: REHABILITATION | Facility: HOSPITAL | Age: 84
End: 2019-07-11
Payer: MEDICARE

## 2019-07-11 DIAGNOSIS — M54.16 CHRONIC RADICULAR PAIN OF LOWER BACK: Primary | ICD-10-CM

## 2019-07-11 DIAGNOSIS — G89.29 CHRONIC RADICULAR PAIN OF LOWER BACK: Primary | ICD-10-CM

## 2019-07-11 DIAGNOSIS — M62.81 MUSCLE WEAKNESS (GENERALIZED): ICD-10-CM

## 2019-07-11 PROCEDURE — 97110 THERAPEUTIC EXERCISES: CPT | Mod: PN

## 2019-07-11 NOTE — PROGRESS NOTES
"                                                    Physical Therapy Daily Note     Name: Jeffrey Ocasio  Clinic Number: [id number]  Diagnosis:   Encounter Diagnoses   Name Primary?    Chronic radicular pain of lower back Yes    Muscle weakness (generalized)      Physician: Cinthia Shrestha MD  Precautions: Standard  Visit #:   PTA Visit #: 1  Time In: 1:10 PM  Time Out: 2:20 PM    Subjective     Pt reports: "I am feeling depressed today, but I took a pill for that so hopefully it takes care of that."  Pain Scale: Jeffrey rates pain on a scale of 0-10 to be 1 currently.    Objective     Jeffrey received individual therapeutic exercises to develop strength and endurance for 45 minutes includin. Nu-Step x 30 mins L5  2. Vigor Gym level 8 x 8 mins   3. Bridges x 15  4. SLR 10 x 2  5. Hip Abduction x 10  6. DKC with Swiss Ball x 3 mins  7. Lumbar rolls x 3 mins  8. Seated Lumbar Flex with SB x 4 mins  9. Scapular Retraction 30 x with black band  10. Wall Slides x 30  11. Toe Taps x 2 mins  12. Side Steps x 2 mins      Jeffrey received the following manual therapy techniques:  were applied to the:  for  minutes including:       The patient received the following direct contact modalities after being cleared for contraindications:     The patient received the following supervised modalities after being cleared for contradictions:     Written Home Exercises Provided:   Pt demo good understanding of the education provided. Jeffrey demonstrated good return demonstration of activities.     Education provided re:  Jeffrey verbalized good understanding of education provided.   No spiritual or educational barriers to learning provided    Assessment     Patient did fairly well with exercises, several rest breaks needed during session; progress as tolerated to improve strength and endurance.  This is a 86 y.o. male referred to outpatient physical therapy and presents with a medical diagnosis of generalized weakness " "secondary to chronic neck/back stenosis .  and demonstrates limitations as described in the problem list. Pt prognosis is Good. Pt will continue to benefit from skilled outpatient physical therapy to address the deficits listed in the problem list, provide pt/family education and to maximize pt's level of independence in the home and community environment.     LONG TERM GOALS:  Long Term Goals: 6 weeks  Pain: Decrease pain to no more than 2/10 to allow for improved ability to perform daily activities   Strength: Improve strength in core muscles to at least 3+/5 for improved lumbopelvic stability  Functional scale: Improve score on LEFS to 65% limitation   Walking: Increase walking distance/duration to 1000ft  without pain  Postures: Increase sitting and/or standing duration to 30 mins without pain   Transfers: Perform Sit to Stand transfers - 30 secs x 10 reps from 24" height   Exercise: demonstrate independence with home exercise program to maintain gains made in therapy.       Plan     Continue with established Plan of Care towards PT goals.    Therapist: Jonathan Favre, PTA  7/11/2019  "

## 2019-07-15 ENCOUNTER — CLINICAL SUPPORT (OUTPATIENT)
Dept: REHABILITATION | Facility: HOSPITAL | Age: 84
End: 2019-07-15
Payer: MEDICARE

## 2019-07-15 DIAGNOSIS — M62.81 MUSCLE WEAKNESS (GENERALIZED): Primary | ICD-10-CM

## 2019-07-15 PROCEDURE — 97110 THERAPEUTIC EXERCISES: CPT | Mod: PN

## 2019-07-15 NOTE — PROGRESS NOTES
Physical Therapy Daily Note     Name: Jeffrey Ocasio  [facility] Number: [id number]  Diagnosis:   Encounter Diagnosis   Name Primary?    Muscle weakness (generalized) Yes     Physician: Cinthia Shrestha MD  Precautions: Standard  Visit #: 10   12  PTA Visit #: 1  Time In: 3;00 PM   Time Out:  4:00 PM      Subjective     Pt reports:  No new c/o's; stated he is doing some of his exercises at home.   Pain Scale: Jeffrey rates pain on a scale of 0-10 to be 1 currently.    Objective     Jeffrey received individual therapeutic exercises to develop strength and endurance for 45 minutes includin. Nu-Step x 30 mins L5  2. Vigor Gym level 8 x 8 mins   3. Bridges x 15  4. SLR 10 x 2  5. Hip Abduction x 10  6. DKC with Swiss Ball x 3 mins  7. Lumbar rolls x 3 mins  8. Seated Lumbar Flex with SB x 4 mins  9. Scapular Retraction 40 x with black band  10. Wall Slides x 40  11. Toe Taps x 2 mins  12. Side Steps x 2 mins      Jeffrey received the following manual therapy techniques:  were applied to the:  for  minutes including:       The patient received the following direct contact modalities after being cleared for contraindications:     The patient received the following supervised modalities after being cleared for contradictions:     Written Home Exercises Provided:   Pt demo good understanding of the education provided. Jeffrey demonstrated good return demonstration of activities.     Education provided re:  Jeffrey verbalized good understanding of education provided.   No spiritual or educational barriers to learning provided    Assessment     Patient did fairly well with exercises, several rest breaks needed during session; progress as tolerated to improve strength and endurance.  This is a 86 y.o. male referred to outpatient physical therapy and presents with a medical diagnosis of generalized weakness secondary to chronic neck/back stenosis .  and demonstrates limitations  "as described in the problem list. Pt prognosis is Good. Pt will continue to benefit from skilled outpatient physical therapy to address the deficits listed in the problem list, provide pt/family education and to maximize pt's level of independence in the home and community environment.     LONG TERM GOALS:  Long Term Goals: 6 weeks  Pain: Decrease pain to no more than 2/10 to allow for improved ability to perform daily activities   Strength: Improve strength in core muscles to at least 3+/5 for improved lumbopelvic stability  Functional scale: Improve score on LEFS to 65% limitation   Walking: Increase walking distance/duration to 1000ft  without pain  Postures: Increase sitting and/or standing duration to 30 mins without pain   Transfers: Perform Sit to Stand transfers - 30 secs x 10 reps from 24" height   Exercise: demonstrate independence with home exercise program to maintain gains made in therapy.       Plan     Continue with established Plan of Care towards PT goals.    Therapist: Dena Bauman, PT  7/15/2019  "

## 2019-07-18 ENCOUNTER — CLINICAL SUPPORT (OUTPATIENT)
Dept: REHABILITATION | Facility: HOSPITAL | Age: 84
End: 2019-07-18
Payer: MEDICARE

## 2019-07-18 DIAGNOSIS — M62.81 MUSCLE WEAKNESS (GENERALIZED): Primary | ICD-10-CM

## 2019-07-18 DIAGNOSIS — M54.2 CERVICALGIA: ICD-10-CM

## 2019-07-18 PROCEDURE — 97110 THERAPEUTIC EXERCISES: CPT | Mod: PN

## 2019-07-18 NOTE — PROGRESS NOTES
"                                                    Physical Therapy Daily Note     Name: Jeffrey Ocasio  [facility] Number: [id number]  Diagnosis:   Encounter Diagnoses   Name Primary?    Muscle weakness (generalized) Yes    Cervicalgia      Physician: Cinthia Shrestha MD  Precautions: Standard  Visit #:   PTA Visit #: 1  Time In: 3;00 PM   Time Out:  4:15 PM     Subjective     Pt reports  "My legs are just getting worse, I can hardly likft them up to get them in my truck"   Pain Scale: Jeffrey rates pain on a scale of 0-10 to be 1 currently.    Objective     Jeffrey received individual therapeutic exercises to develop strength and endurance for 45 minutes includin. Nu-Step x 30 mins L5  2. Vigor Gym level 8 x 8 mins   3. Bridges x 15  4. SLR 10 x 2  5. Hip Abduction x 10  6. DKC with Swiss Ball x 3 mins  7. Lumbar rolls x 3 mins  8. Seated Lumbar Flex with SB x 4 mins  9. Scapular Retraction 40 x with black band  10. Wall Slides x 40  11. Toe Taps x 2 mins  12. Side Steps x 2 mins  13. Clams x 10  14. Seated Hip Flexion x 15  15. LAQ's x 15      Jeffrey received the following manual therapy techniques:  were applied to the:  for  minutes including:       The patient received the following direct contact modalities after being cleared for contraindications:     The patient received the following supervised modalities after being cleared for contradictions:     Written Home Exercises Provided:   Standing upright - shoulders back; glute squeezes  Hip flexion - marching  LAq's / TKE's   Pt demo good understanding of the education provided. Jeffrey demonstrated good return demonstration of activities.     Education provided re:  Jeffrey verbalized good understanding of education provided.   No spiritual or educational barriers to learning provided    Assessment     Patient did fairly well with exercises. We agreed that he needs to continue with therapy as he is not where he needs to be yet.  Added a few new " "exercises and will continue to add in more functional strengthening activities to address his concerns and deficits.   This is a 86 y.o. male referred to outpatient physical therapy and presents with a medical diagnosis of generalized weakness secondary to chronic neck/back stenosis .  and demonstrates limitations as described in the problem list. Pt prognosis is Good. Pt will continue to benefit from skilled outpatient physical therapy to address the deficits listed in the problem list, provide pt/family education and to maximize pt's level of independence in the home and community environment.     LONG TERM GOALS:  Long Term Goals: 6 weeks  Pain: Decrease pain to no more than 2/10 to allow for improved ability to perform daily activities   Strength: Improve strength in core muscles to at least 3+/5 for improved lumbopelvic stability  Functional scale: Improve score on LEFS to 65% limitation   Walking: Increase walking distance/duration to 1000ft  without pain  Postures: Increase sitting and/or standing duration to 30 mins without pain   Transfers: Perform Sit to Stand transfers - 30 secs x 10 reps from 24" height   Exercise: demonstrate independence with home exercise program to maintain gains made in therapy.       Plan     Continue with established Plan of Care towards PT goals. Re-assess progress toward goals next visit     Therapist: Dena Bauman, PT  7/18/2019  "

## 2019-07-23 ENCOUNTER — CLINICAL SUPPORT (OUTPATIENT)
Dept: REHABILITATION | Facility: HOSPITAL | Age: 84
End: 2019-07-23
Payer: MEDICARE

## 2019-07-23 DIAGNOSIS — M54.16 CHRONIC RADICULAR PAIN OF LOWER BACK: Primary | ICD-10-CM

## 2019-07-23 DIAGNOSIS — M62.81 MUSCLE WEAKNESS (GENERALIZED): ICD-10-CM

## 2019-07-23 DIAGNOSIS — G89.29 CHRONIC RADICULAR PAIN OF LOWER BACK: Primary | ICD-10-CM

## 2019-07-23 PROCEDURE — 97110 THERAPEUTIC EXERCISES: CPT | Mod: PN

## 2019-07-23 NOTE — PROGRESS NOTES
Physical Therapy Daily Note     Name: Jeffrey Ocasio  [facility] Number: [id number]  Diagnosis:   Encounter Diagnoses   Name Primary?    Chronic radicular pain of lower back Yes    Muscle weakness (generalized)      Physician: Cinthia Shrestha MD  Precautions: Standard  Visit #:   PTA Visit #: 1  Time In:  2:00 PM   Time Out:  3:10 PM     Subjective     Pt reports  No new c/o's   Pain Scale: Jeffrey rates pain on a scale of 0-10 to be 0 currently.    Objective     Jeffrey received individual therapeutic exercises to develop strength and endurance for 45 minutes includin. Nu-Step x 30 mins L5  2. Vigor Gym level 8 x 8 mins   3. Bridges x 15  4. SLR 10 x 2  5. Hip Abduction x 10  6. DKC with Swiss Ball x 3 mins  7. Lumbar rolls x 3 mins  8. Seated Lumbar Flex with SB x 4 mins  9. Scapular Retraction 40 x with black band  10. Wall Slides x 40  11. Toe Taps x 2 mins  12. Side Steps x 2 mins  13. Clams x 10  14. Seated Hip Flexion x 15  15. LAQ's x 15      Jeffrey received the following manual therapy techniques:  were applied to the:  for  minutes including:       The patient received the following direct contact modalities after being cleared for contraindications:     The patient received the following supervised modalities after being cleared for contradictions:     Written Home Exercises Provided:   Standing upright - shoulders back; glute squeezes  Hip flexion - marching  LAq's / TKE's   Pt demo good understanding of the education provided. Jeffrey demonstrated good return demonstration of activities.     Education provided re:  Jeffrey verbalized good understanding of education provided.   No spiritual or educational barriers to learning provided    Assessment     Patient did fairly well with exercises. We agreed that he needs to continue with therapy as he is not where he needs to be yet.  Added a few new exercises and will continue to add in more  "functional strengthening activities to address his concerns and deficits.   This is a 86 y.o. male referred to outpatient physical therapy and presents with a medical diagnosis of generalized weakness secondary to chronic neck/back stenosis .  and demonstrates limitations as described in the problem list. Pt prognosis is Good. Pt will continue to benefit from skilled outpatient physical therapy to address the deficits listed in the problem list, provide pt/family education and to maximize pt's level of independence in the home and community environment.     LONG TERM GOALS:  Long Term Goals: 6 weeks  Pain: Decrease pain to no more than 2/10 to allow for improved ability to perform daily activities   Strength: Improve strength in core muscles to at least 3+/5 for improved lumbopelvic stability  Functional scale: Improve score on LEFS to 65% limitation   Walking: Increase walking distance/duration to 1000ft  without pain  Postures: Increase sitting and/or standing duration to 30 mins without pain   Transfers: Perform Sit to Stand transfers - 30 secs x 10 reps from 24" height   Exercise: demonstrate independence with home exercise program to maintain gains made in therapy.       Plan     Continue with established Plan of Care towards PT goals. Re-assess progress toward goals next visit     Therapist: Jonathan Favre, PTA  7/23/2019  "

## 2019-07-25 ENCOUNTER — CLINICAL SUPPORT (OUTPATIENT)
Dept: REHABILITATION | Facility: HOSPITAL | Age: 84
End: 2019-07-25
Payer: MEDICARE

## 2019-07-25 DIAGNOSIS — M54.16 CHRONIC RADICULAR PAIN OF LOWER BACK: Primary | ICD-10-CM

## 2019-07-25 DIAGNOSIS — G89.29 CHRONIC RADICULAR PAIN OF LOWER BACK: Primary | ICD-10-CM

## 2019-07-25 DIAGNOSIS — M62.81 MUSCLE WEAKNESS (GENERALIZED): ICD-10-CM

## 2019-07-25 PROCEDURE — 97110 THERAPEUTIC EXERCISES: CPT | Mod: PN

## 2019-07-25 NOTE — PROGRESS NOTES
Physical Therapy Daily Note     Name: Jeffrey Ocasio  [facility] Number: [id number]  Diagnosis:   Encounter Diagnoses   Name Primary?    Chronic radicular pain of lower back Yes    Muscle weakness (generalized)      Physician: Cinthia Shrestha MD  Precautions: Standard  Visit #: 13  24  PTA Visit #: 2  Time In:  2:00 PM   Time Out:  3:20 PM     Subjective     Pt reports  No new c/o's   Pain Scale: Jeffrey rates pain on a scale of 0-10 to be 0 currently.    Objective     Jeffrey received individual therapeutic exercises to develop strength and endurance for 45 minutes includin. Nu-Step x 30 mins L5  2. Vigor Gym level 8 x 8 mins   3. Bridges x 15  4. SLR 10 x 2  5. Hip Abduction x 10  6. DKC with Swiss Ball x 3 mins  7. Lumbar rolls x 3 mins  8. Seated Lumbar Flex with SB x 4 mins  9. Scapular Retraction 40 x with black band  10. Wall Slides x 40  11. Toe Taps x 2 mins  12. Side Steps x 2 mins  13. Clams x 10  14. Seated Hip Flexion x 15  15. LAQ's x 15      Jeffrey received the following manual therapy techniques:  were applied to the:  for  minutes including:       The patient received the following direct contact modalities after being cleared for contraindications:     The patient received the following supervised modalities after being cleared for contradictions:     Written Home Exercises Provided:   Standing upright - shoulders back; glute squeezes  Hip flexion - marching  LAq's / TKE's   Pt demo good understanding of the education provided. Jeffrey demonstrated good return demonstration of activities.     Education provided re:  Jeffrey verbalized good understanding of education provided.   No spiritual or educational barriers to learning provided    Assessment     Patient did fairly well with exercises. We agreed that he needs to continue with therapy as he is not where he needs to be yet.  Added a few new exercises and will continue to add in more  "functional strengthening activities to address his concerns and deficits.   This is a 86 y.o. male referred to outpatient physical therapy and presents with a medical diagnosis of generalized weakness secondary to chronic neck/back stenosis .  and demonstrates limitations as described in the problem list. Pt prognosis is Good. Pt will continue to benefit from skilled outpatient physical therapy to address the deficits listed in the problem list, provide pt/family education and to maximize pt's level of independence in the home and community environment.     LONG TERM GOALS:  Long Term Goals: 6 weeks  Pain: Decrease pain to no more than 2/10 to allow for improved ability to perform daily activities   Strength: Improve strength in core muscles to at least 3+/5 for improved lumbopelvic stability  Functional scale: Improve score on LEFS to 65% limitation   Walking: Increase walking distance/duration to 1000ft  without pain  Postures: Increase sitting and/or standing duration to 30 mins without pain   Transfers: Perform Sit to Stand transfers - 30 secs x 10 reps from 24" height   Exercise: demonstrate independence with home exercise program to maintain gains made in therapy.       Plan     Continue with established Plan of Care towards PT goals. Re-assess progress toward goals next visit     Therapist: Jonathan Favre, PTA  7/25/2019  "

## 2019-07-30 ENCOUNTER — CLINICAL SUPPORT (OUTPATIENT)
Dept: REHABILITATION | Facility: HOSPITAL | Age: 84
End: 2019-07-30
Payer: MEDICARE

## 2019-07-30 DIAGNOSIS — M62.81 MUSCLE WEAKNESS (GENERALIZED): ICD-10-CM

## 2019-07-30 DIAGNOSIS — M54.16 CHRONIC RADICULAR PAIN OF LOWER BACK: Primary | ICD-10-CM

## 2019-07-30 DIAGNOSIS — G89.29 CHRONIC RADICULAR PAIN OF LOWER BACK: Primary | ICD-10-CM

## 2019-07-30 PROCEDURE — 97110 THERAPEUTIC EXERCISES: CPT | Mod: PN

## 2019-07-30 NOTE — PROGRESS NOTES
Physical Therapy Daily Note     Name: Jeffrey Ocasio  [facility] Number: [id number]  Diagnosis:   Encounter Diagnoses   Name Primary?    Chronic radicular pain of lower back Yes    Muscle weakness (generalized)      Physician: Cinthia Shrestha MD  Precautions: Standard  Visit #: 14 of 24  PTA Visit #: 3  Time In:  2:00 PM   Time Out:  3:15 PM     Subjective     Pt reports  No new c/o's   Pain Scale: Jeffrey rates pain on a scale of 0-10 to be 1-2 currently.    Objective     Jeffrey received individual therapeutic exercises to develop strength and endurance for 45 minutes includin. Nu-Step x 30 mins L5  2. Vigor Gym level 8 x 8 mins   3. Bridges x 20  4. SLR 10 x 2  5. Hip Abduction x 10  6. DKC with Swiss Ball x 3 mins  7. Lumbar rolls x 3 mins  8. Seated Lumbar Flex with SB x 4 mins  9. Scapular Retraction 30 x with black band  10. Wall Slides x 30  11. Toe Taps x 2 mins  12. Side Steps x 2 mins  13. Clams x 10  14. Seated Hip Flexion x 15  15. LAQ's x 15      Jeffrey received the following manual therapy techniques:  were applied to the:  for  minutes including:       The patient received the following direct contact modalities after being cleared for contraindications:     The patient received the following supervised modalities after being cleared for contradictions:     Written Home Exercises Provided:   Standing upright - shoulders back; glute squeezes  Hip flexion - marching  LAq's / TKE's   Pt demo good understanding of the education provided. Jeffrey demonstrated good return demonstration of activities.     Education provided re:  Jeffrey verbalized good understanding of education provided.   No spiritual or educational barriers to learning provided    Assessment     Patient did fairly well with exercises. We agreed that he needs to continue with therapy as he is not where he needs to be yet.  Added a few new exercises and will continue to add in more  "functional strengthening activities to address his concerns and deficits.   This is a 86 y.o. male referred to outpatient physical therapy and presents with a medical diagnosis of generalized weakness secondary to chronic neck/back stenosis .  and demonstrates limitations as described in the problem list. Pt prognosis is Good. Pt will continue to benefit from skilled outpatient physical therapy to address the deficits listed in the problem list, provide pt/family education and to maximize pt's level of independence in the home and community environment.     LONG TERM GOALS:  Long Term Goals: 6 weeks  Pain: Decrease pain to no more than 2/10 to allow for improved ability to perform daily activities   Strength: Improve strength in core muscles to at least 3+/5 for improved lumbopelvic stability  Functional scale: Improve score on LEFS to 65% limitation   Walking: Increase walking distance/duration to 1000ft  without pain  Postures: Increase sitting and/or standing duration to 30 mins without pain   Transfers: Perform Sit to Stand transfers - 30 secs x 10 reps from 24" height   Exercise: demonstrate independence with home exercise program to maintain gains made in therapy.       Plan     Continue with established Plan of Care towards PT goals. Re-assess progress toward goals next visit     Therapist: Jonathan Favre, PTA  7/30/2019  "

## 2019-08-01 ENCOUNTER — CLINICAL SUPPORT (OUTPATIENT)
Dept: REHABILITATION | Facility: HOSPITAL | Age: 84
End: 2019-08-01
Payer: MEDICARE

## 2019-08-01 DIAGNOSIS — M62.81 MUSCLE WEAKNESS (GENERALIZED): Primary | ICD-10-CM

## 2019-08-01 PROCEDURE — 97110 THERAPEUTIC EXERCISES: CPT | Mod: PN

## 2019-08-01 NOTE — PROGRESS NOTES
Physical Therapy Daily Note     Name: Jeffrey Ocasio  [facility] Number: [id number]  Diagnosis:   Encounter Diagnosis   Name Primary?    Muscle weakness (generalized) Yes     Physician: Cinthia Shreshta MD  Precautions: Standard  Visit #: 15  24  PTA Visit #: 3  Time In:  1:00 PM    Time Out:  2: 00 PM     Subjective     Pt reports   Antalgic gait pattern noted today, jeffrey stated that his lower back was hurting a little more today.   Pain Scale: Jeffrey rates pain on a scale of 0-10 to be 4-5 currently.    Objective     Jeffrey received individual therapeutic exercises to develop strength and endurance for 45 minutes includin. Nu-Step x 30 mins L5  2. Vigor Gym level 8 x 8 mins   3. Bridges x 20  4. SLR 10 x 2  5. Hip Abduction x 10  6. DKC with Swiss Ball x 3 mins  7. Lumbar rolls x 3 mins  8. Seated Lumbar Flex with SB x 4 mins  9. Scapular Retraction 30 x with black band  10. Wall Slides x 30  11. Toe Taps x 2 mins  12. Side Steps x 2 mins  13. Clams x 10  14. Seated Hip Flexion x 15  15. LAQ's x 15      Jeffrey received the following manual therapy techniques:  were applied to the:  for  minutes including:       The patient received the following direct contact modalities after being cleared for contraindications:     The patient received the following supervised modalities after being cleared for contradictions:     Written Home Exercises Provided:   Standing upright - shoulders back; glute squeezes  Hip flexion - marching  LAq's / TKE's   Pt demo good understanding of the education provided. Jeffrey demonstrated good return demonstration of activities.     Education provided re:  Jeffrey verbalized good understanding of education provided.   No spiritual or educational barriers to learning provided    Assessment     Patient did fairly well with exercises. We need to add some resistance to his activity in order to build some strength. This is a 86 y.o. male  "referred to outpatient physical therapy and presents with a medical diagnosis of generalized weakness secondary to chronic neck/back stenosis .  and demonstrates limitations as described in the problem list. Pt prognosis is Good. Pt will continue to benefit from skilled outpatient physical therapy to address the deficits listed in the problem list, provide pt/family education and to maximize pt's level of independence in the home and community environment.     LONG TERM GOALS:  Long Term Goals: 6 weeks  Pain: Decrease pain to no more than 2/10 to allow for improved ability to perform daily activities   Strength: Improve strength in core muscles to at least 3+/5 for improved lumbopelvic stability  Functional scale: Improve score on LEFS to 65% limitation   Walking: Increase walking distance/duration to 1000ft  without pain  Postures: Increase sitting and/or standing duration to 30 mins without pain   Transfers: Perform Sit to Stand transfers - 30 secs x 10 reps from 24" height   Exercise: demonstrate independence with home exercise program to maintain gains made in therapy.       Plan     Continue with established Plan of Care towards PT goals.    Therapist: Dena Bauman, PT  8/1/2019  "

## 2019-08-06 ENCOUNTER — CLINICAL SUPPORT (OUTPATIENT)
Dept: REHABILITATION | Facility: HOSPITAL | Age: 84
End: 2019-08-06
Payer: MEDICARE

## 2019-08-06 DIAGNOSIS — G89.29 CHRONIC RADICULAR PAIN OF LOWER BACK: Primary | ICD-10-CM

## 2019-08-06 DIAGNOSIS — M54.16 CHRONIC RADICULAR PAIN OF LOWER BACK: Primary | ICD-10-CM

## 2019-08-06 DIAGNOSIS — M62.81 MUSCLE WEAKNESS (GENERALIZED): ICD-10-CM

## 2019-08-06 PROCEDURE — 97110 THERAPEUTIC EXERCISES: CPT | Mod: PN

## 2019-08-06 NOTE — PROGRESS NOTES
Physical Therapy Daily Note     Name: Jeffrey Ocasio  [facility] Number: [id number]  Diagnosis:   Encounter Diagnoses   Name Primary?    Chronic radicular pain of lower back Yes    Muscle weakness (generalized)      Physician: Cinthia Shrestha MD  Precautions: Standard  Visit #: 16 of 24  PTA Visit #: 1  Time In:  1:30 PM    Time Out:  2:50 PM     Subjective     Pt reports: No new c/o's.  Pain Scale: Jeffrey rates pain on a scale of 0-10 to be 2-3 currently.    Objective     Jeffrey received individual therapeutic exercises to develop strength and endurance for 45 minutes includin. Nu-Step x 30 mins L5  2. Vigor Gym level 8 x 8 mins   3. Bridges x 20  4. SLR 10 x 2  5. Hip Abduction x 10  6. DKC with Swiss Ball x 3 mins  7. Lumbar rolls x 3 mins  8. Seated Lumbar Flex with SB x 4 mins  9. Scapular Retraction 30 x with black band  10. Wall Slides x 30  11. Toe Taps x 2 mins  12. Side Steps x 2 mins  13. Clams x 10  14. Seated Hip Flexion x 15  15. LAQ's x 15      Jeffrey received the following manual therapy techniques:  were applied to the:  for  minutes including:       The patient received the following direct contact modalities after being cleared for contraindications:     The patient received the following supervised modalities after being cleared for contradictions:     Written Home Exercises Provided:   Standing upright - shoulders back; glute squeezes  Hip flexion - marching  LAq's / TKE's   Pt demo good understanding of the education provided. Jeffrey demonstrated good return demonstration of activities.     Education provided re:  Jeffrey verbalized good understanding of education provided.   No spiritual or educational barriers to learning provided    Assessment     Patient did fairly well with exercises. We need to add some resistance to his activity in order to build some strength. This is a 86 y.o. male referred to outpatient physical therapy and  "presents with a medical diagnosis of generalized weakness secondary to chronic neck/back stenosis .  and demonstrates limitations as described in the problem list. Pt prognosis is Good. Pt will continue to benefit from skilled outpatient physical therapy to address the deficits listed in the problem list, provide pt/family education and to maximize pt's level of independence in the home and community environment.     LONG TERM GOALS:  Long Term Goals: 6 weeks  Pain: Decrease pain to no more than 2/10 to allow for improved ability to perform daily activities   Strength: Improve strength in core muscles to at least 3+/5 for improved lumbopelvic stability  Functional scale: Improve score on LEFS to 65% limitation   Walking: Increase walking distance/duration to 1000ft  without pain  Postures: Increase sitting and/or standing duration to 30 mins without pain   Transfers: Perform Sit to Stand transfers - 30 secs x 10 reps from 24" height   Exercise: demonstrate independence with home exercise program to maintain gains made in therapy.       Plan     Continue with established Plan of Care towards PT goals.    Therapist: Jonathan Favre, PTA  8/6/2019  "

## 2019-08-09 ENCOUNTER — CLINICAL SUPPORT (OUTPATIENT)
Dept: REHABILITATION | Facility: HOSPITAL | Age: 84
End: 2019-08-09
Payer: MEDICARE

## 2019-08-09 DIAGNOSIS — G89.29 CHRONIC RADICULAR PAIN OF LOWER BACK: Primary | ICD-10-CM

## 2019-08-09 DIAGNOSIS — M62.81 MUSCLE WEAKNESS (GENERALIZED): ICD-10-CM

## 2019-08-09 DIAGNOSIS — M54.16 CHRONIC RADICULAR PAIN OF LOWER BACK: Primary | ICD-10-CM

## 2019-08-09 PROCEDURE — 97110 THERAPEUTIC EXERCISES: CPT | Mod: PN

## 2019-08-09 NOTE — PROGRESS NOTES
Physical Therapy Daily Note     Name: Jeffrey Ocasio  [facility] Number: [id number]  Diagnosis:   Encounter Diagnoses   Name Primary?    Chronic radicular pain of lower back Yes    Muscle weakness (generalized)      Physician: Cinthia Shrestha MD  Precautions: Standard  Visit #: 17 of 24  PTA Visit #: 2  Time In:  1:00 PM    Time Out:  2:10 PM     Subjective     Pt reports: No new c/o's.  Pain Scale: Jeffrey rates pain on a scale of 0-10 to be 3 currently.    Objective     Jeffrey received individual therapeutic exercises to develop strength and endurance for 45 minutes including:   Nu-Step x 30 mins L5   Bridges x 20   SLR 10 x 2   Hip Abduction x 10   DKC with Swiss Ball x 3 mins   Lumbar rolls x 3 mins   Seated Lumbar Flex with SB x 4 mins   Scapular Retraction 30 x with black band   Wall Slides x 30   Toe Taps x 2 mins   Side Steps x 2 mins   Clams x 10   Seated Hip Flexion x 15   LAQ's x 15      Jeffrey received the following manual therapy techniques:  were applied to the:  for  minutes including:       The patient received the following direct contact modalities after being cleared for contraindications:     The patient received the following supervised modalities after being cleared for contradictions:     Written Home Exercises Provided:   Standing upright - shoulders back; glute squeezes  Hip flexion - marching  LAq's / TKE's   Pt demo good understanding of the education provided. Jeffrey demonstrated good return demonstration of activities.     Education provided re:  Jeffrey verbalized good understanding of education provided.   No spiritual or educational barriers to learning provided    Assessment     Patient did fairly well with exercises. This is a 86 y.o. male referred to outpatient physical therapy and presents with a medical diagnosis of generalized weakness secondary to chronic neck/back stenosis .  and demonstrates limitations as described in the  "problem list. Pt prognosis is Good. Pt will continue to benefit from skilled outpatient physical therapy to address the deficits listed in the problem list, provide pt/family education and to maximize pt's level of independence in the home and community environment.     LONG TERM GOALS:  Long Term Goals: 6 weeks  Pain: Decrease pain to no more than 2/10 to allow for improved ability to perform daily activities   Strength: Improve strength in core muscles to at least 3+/5 for improved lumbopelvic stability  Functional scale: Improve score on LEFS to 65% limitation   Walking: Increase walking distance/duration to 1000ft  without pain  Postures: Increase sitting and/or standing duration to 30 mins without pain   Transfers: Perform Sit to Stand transfers - 30 secs x 10 reps from 24" height   Exercise: demonstrate independence with home exercise program to maintain gains made in therapy.       Plan     Continue with established Plan of Care towards PT goals.    Therapist: Jonathan Favre, PTA  8/9/2019  "

## 2019-08-13 ENCOUNTER — CLINICAL SUPPORT (OUTPATIENT)
Dept: REHABILITATION | Facility: HOSPITAL | Age: 84
End: 2019-08-13
Payer: MEDICARE

## 2019-08-13 DIAGNOSIS — G89.29 CHRONIC RADICULAR PAIN OF LOWER BACK: ICD-10-CM

## 2019-08-13 DIAGNOSIS — M62.81 MUSCLE WEAKNESS (GENERALIZED): Primary | ICD-10-CM

## 2019-08-13 DIAGNOSIS — M54.16 CHRONIC RADICULAR PAIN OF LOWER BACK: ICD-10-CM

## 2019-08-13 PROCEDURE — 97110 THERAPEUTIC EXERCISES: CPT | Mod: PN

## 2019-08-13 NOTE — PROGRESS NOTES
"                                                    Physical Therapy Daily Note     Name: Jeffrey Ocasio  Clinic Number: [id number]  Diagnosis:   Encounter Diagnoses   Name Primary?    Muscle weakness (generalized) Yes    Chronic radicular pain of lower back      Physician: Cinthia Shrestha MD  Precautions: Standard  Visit #: 18 of 24  PTA Visit #: 3  Time In:  2:15 PM    Time Out:  3:35 PM     Subjective     Pt reports: "My right shoulder is getting worse."   Pain Scale: Jeffrey rates pain on a scale of 0-10 to be 0 currently.    Objective     Jeffrey received individual therapeutic exercises to develop strength and endurance for 45 minutes including:   Nu-Step x 30 mins L5   Bridges x 20   SLR 10 x 2   Hip Abduction x 10   DKC with Swiss Ball x 3 mins   Lumbar rolls x 3 mins   Seated Lumbar Flex with SB x 4 mins   Scapular Retraction 30 x with black band   Wall Slides x 30   Toe Taps x 2 mins   Side Steps x 2 mins   Clams x 15   Seated Hip Flexion x 15   LAQ's x 15      Jeffrey received the following manual therapy techniques:  were applied to the:  for  minutes including:       The patient received the following direct contact modalities after being cleared for contraindications:     The patient received the following supervised modalities after being cleared for contradictions:     Written Home Exercises Provided:   Standing upright - shoulders back; glute squeezes  Hip flexion - marching  LAq's / TKE's   Pt demo good understanding of the education provided. Jeffrey demonstrated good return demonstration of activities.     Education provided re:  Jeffrey verbalized good understanding of education provided.   No spiritual or educational barriers to learning provided    Assessment     Patient did fairly well with exercises. This is a 86 y.o. male referred to outpatient physical therapy and presents with a medical diagnosis of generalized weakness secondary to chronic neck/back stenosis .  and demonstrates limitations " "as described in the problem list. Pt prognosis is Good. Pt will continue to benefit from skilled outpatient physical therapy to address the deficits listed in the problem list, provide pt/family education and to maximize pt's level of independence in the home and community environment.     LONG TERM GOALS:  Long Term Goals: 6 weeks  Pain: Decrease pain to no more than 2/10 to allow for improved ability to perform daily activities   Strength: Improve strength in core muscles to at least 3+/5 for improved lumbopelvic stability  Functional scale: Improve score on LEFS to 65% limitation   Walking: Increase walking distance/duration to 1000ft  without pain  Postures: Increase sitting and/or standing duration to 30 mins without pain   Transfers: Perform Sit to Stand transfers - 30 secs x 10 reps from 24" height   Exercise: demonstrate independence with home exercise program to maintain gains made in therapy.       Plan     Continue with established Plan of Care towards PT goals.    Therapist: Jonathan Favre, PTA  8/13/2019  "

## 2019-10-08 ENCOUNTER — OFFICE VISIT (OUTPATIENT)
Dept: PODIATRY | Facility: CLINIC | Age: 84
End: 2019-10-08
Payer: MEDICARE

## 2019-10-08 VITALS
HEART RATE: 75 BPM | BODY MASS INDEX: 27.46 KG/M2 | TEMPERATURE: 98 F | SYSTOLIC BLOOD PRESSURE: 149 MMHG | WEIGHT: 214 LBS | HEIGHT: 74 IN | DIASTOLIC BLOOD PRESSURE: 92 MMHG

## 2019-10-08 DIAGNOSIS — G60.9 IDIOPATHIC PERIPHERAL NEUROPATHY: ICD-10-CM

## 2019-10-08 DIAGNOSIS — B35.3 TINEA PEDIS OF BOTH FEET: ICD-10-CM

## 2019-10-08 DIAGNOSIS — L60.0 INGROWN NAIL: Primary | ICD-10-CM

## 2019-10-08 DIAGNOSIS — L03.031 PARONYCHIA OF GREAT TOE, RIGHT: ICD-10-CM

## 2019-10-08 PROCEDURE — 99999 PR PBB SHADOW E&M-EST. PATIENT-LVL III: ICD-10-PCS | Mod: PBBFAC,,, | Performed by: PODIATRIST

## 2019-10-08 PROCEDURE — 99999 PR PBB SHADOW E&M-EST. PATIENT-LVL III: CPT | Mod: PBBFAC,,, | Performed by: PODIATRIST

## 2019-10-08 PROCEDURE — 99213 OFFICE O/P EST LOW 20 MIN: CPT | Mod: S$PBB,,, | Performed by: PODIATRIST

## 2019-10-08 PROCEDURE — 99213 PR OFFICE/OUTPT VISIT, EST, LEVL III, 20-29 MIN: ICD-10-PCS | Mod: S$PBB,,, | Performed by: PODIATRIST

## 2019-10-08 PROCEDURE — 99213 OFFICE O/P EST LOW 20 MIN: CPT | Mod: PBBFAC,PN | Performed by: PODIATRIST

## 2019-10-08 RX ORDER — CYCLOBENZAPRINE HCL 5 MG
TABLET ORAL
Refills: 0 | COMMUNITY
Start: 2019-10-02

## 2019-10-08 RX ORDER — NAPROXEN 500 MG/1
TABLET ORAL
Refills: 0 | COMMUNITY
Start: 2019-10-03 | End: 2021-01-28

## 2019-10-08 RX ORDER — MECLIZINE HYDROCHLORIDE 25 MG/1
25 TABLET ORAL 3 TIMES DAILY PRN
Refills: 3 | COMMUNITY
Start: 2019-08-07

## 2019-10-08 NOTE — LETTER
October 8, 2019      Cinthia Shrestha MD  [address]  [place]  [facility] [place] [address]           [facility] - Podiatry/Wound Care  [facility] [facility] [address]  Phone: [phone]  Fax: [phone]          Patient: Jeffrey Ocasio   MR Number: [id number]   YOB: 1933   Date of Visit: 10/8/2019       Dear Dr. Cinthia Shrestha:    Thank you for referring Jeffrey Ocasio to me for evaluation. Attached you will find relevant portions of my assessment and plan of care.    If you have questions, please do not hesitate to call me. I look forward to following Jeffrey Ocasio along with you.    Sincerely,    David Reed, DPM    Enclosure  CC:  No Recipients    If you would like to receive this communication electronically, please contact [email or web address] or [phone] to request more information on [employer] Link access.    For providers and/or their staff who would like to refer a patient to [facility], please contact us through our one-stop-shop provider referral line, [facility], at [phone].    If you feel you have received this communication in error or would no longer like to receive these types of communications, please e-mail [email or web address]

## 2019-10-08 NOTE — PROGRESS NOTES
Subjective:       Patient ID: Jeffrey Ocasio is a 86 y.o. male.    Chief Complaint: Follow-up and Nail Problem  Patient presents today he is concerned about ingrowing toenails on his big toes.   Nail Problem   Associated symptoms include arthralgias and numbness.   Ingrown Toenail   Associated symptoms include arthralgias and numbness.   Follow-up   Associated symptoms include arthralgias and numbness.     Review of Systems   Musculoskeletal: Positive for arthralgias, back pain and gait problem.   Neurological: Positive for numbness.   All other systems reviewed and are negative.      Objective:      Physical Exam   Constitutional: He appears well-developed and well-nourished.   Cardiovascular:   Pulses:       Dorsalis pedis pulses are 1+ on the right side, and 1+ on the left side.        Posterior tibial pulses are 1+ on the right side, and 1+ on the left side.   Pulmonary/Chest: Effort normal.   Musculoskeletal: He exhibits edema and deformity.        Right foot: There is deformity.        Left foot: There is deformity.   Feet:   Right Foot:   Protective Sensation: 4 sites tested. 1 site sensed.  Left Foot:   Protective Sensation: 4 sites tested. 1 site sensed.  Neurological: He displays abnormal reflex.   Skin: Skin is warm. Capillary refill takes more than 3 seconds. There is erythema.   Psychiatric: He has a normal mood and affect. His behavior is normal. Judgment and thought content normal.   Nursing note and vitals reviewed.    On evaluation patient has areas of previously noted ingrown infected toenail on both big toes there is signs of fungal involvement some of the nail is growing into both the medial and lateral border which raises concern for infection there is some mild discomfort noted upon palpation. Patient does have history of currently displayed signs of interdigital maceration with signs of interdigital fungal involvement. Patient displays findings consistent with previously noted neuropathy  bilateral.    Assessment:       1. Ingrown nail    2. Idiopathic peripheral neuropathy    3. Paronychia of great toe, right    4. Tinea pedis of both feet        Plan:       Patient presents today stating he has a very painful right great toe patient has a chronic history of infected ingrown toenails on both big toes the inside of his right great toe has been bothering him for about a week.  The medial border of the patient's right hallux looks much better there had been an abscess underlying this area that was drained the redness swelling and discomfort in the area has completely resolved I am going to have him discontinue the antibiotic ointment that he was putting on the area and the dressing I have advised the patient to monitor this very closely he had several other nails that were showing signs of becoming ingrown these were debrided today and trimmed patient states he is doing good I have advised him we need to monitor this any increased redness swelling pain or discomfort he is to contact us immediately otherwise I will plan to see the patient as needed for follow-up.  Patient relates he is currently dealing with sciatic nerve pain on the left side which has made it very difficult for him to sleep her set for any period of time.  Total face-to-face time equaled 15 min.This note was created using [employer] voice recognition software that occasionally misinterpreted phrases or words.

## 2019-12-04 ENCOUNTER — OFFICE VISIT (OUTPATIENT)
Dept: PODIATRY | Facility: CLINIC | Age: 84
End: 2019-12-04
Payer: MEDICARE

## 2019-12-04 VITALS
HEIGHT: 74 IN | HEART RATE: 97 BPM | SYSTOLIC BLOOD PRESSURE: 99 MMHG | TEMPERATURE: 96 F | DIASTOLIC BLOOD PRESSURE: 68 MMHG | BODY MASS INDEX: 27.46 KG/M2 | WEIGHT: 214 LBS

## 2019-12-04 DIAGNOSIS — L60.0 INGROWN NAIL: ICD-10-CM

## 2019-12-04 DIAGNOSIS — B35.3 TINEA PEDIS OF BOTH FEET: ICD-10-CM

## 2019-12-04 DIAGNOSIS — L03.031 PARONYCHIA OF GREAT TOE, RIGHT: Primary | ICD-10-CM

## 2019-12-04 DIAGNOSIS — G60.9 IDIOPATHIC PERIPHERAL NEUROPATHY: ICD-10-CM

## 2019-12-04 PROCEDURE — 99999 PR PBB SHADOW E&M-EST. PATIENT-LVL III: ICD-10-PCS | Mod: PBBFAC,,, | Performed by: PODIATRIST

## 2019-12-04 PROCEDURE — 99213 OFFICE O/P EST LOW 20 MIN: CPT | Mod: PBBFAC | Performed by: PODIATRIST

## 2019-12-04 PROCEDURE — 1125F PR PAIN SEVERITY QUANTIFIED, PAIN PRESENT: ICD-10-PCS | Mod: ,,, | Performed by: PODIATRIST

## 2019-12-04 PROCEDURE — 99213 OFFICE O/P EST LOW 20 MIN: CPT | Mod: S$PBB,,, | Performed by: PODIATRIST

## 2019-12-04 PROCEDURE — 1159F PR MEDICATION LIST DOCUMENTED IN MEDICAL RECORD: ICD-10-PCS | Mod: ,,, | Performed by: PODIATRIST

## 2019-12-04 PROCEDURE — 99213 PR OFFICE/OUTPT VISIT, EST, LEVL III, 20-29 MIN: ICD-10-PCS | Mod: S$PBB,,, | Performed by: PODIATRIST

## 2019-12-04 PROCEDURE — 99999 PR PBB SHADOW E&M-EST. PATIENT-LVL III: CPT | Mod: PBBFAC,,, | Performed by: PODIATRIST

## 2019-12-04 PROCEDURE — 1159F MED LIST DOCD IN RCRD: CPT | Mod: ,,, | Performed by: PODIATRIST

## 2019-12-04 PROCEDURE — 1125F AMNT PAIN NOTED PAIN PRSNT: CPT | Mod: ,,, | Performed by: PODIATRIST

## 2019-12-04 RX ORDER — METHYLPREDNISOLONE 4 MG/1
TABLET ORAL
Refills: 0 | COMMUNITY
Start: 2019-11-21 | End: 2020-05-04

## 2019-12-08 NOTE — PROGRESS NOTES
Subjective:       Patient ID: Jeffrey Ocasio is a 86 y.o. male.    Chief Complaint: Follow-up and Nail Problem  Patient presents today he is concerned about ingrowing toenails on his big toes.   Follow-up   Associated symptoms include arthralgias and numbness.   Nail Problem   Associated symptoms include arthralgias and numbness.   Ingrown Toenail   Associated symptoms include arthralgias and numbness.     Review of Systems   Musculoskeletal: Positive for arthralgias, back pain and gait problem.   Neurological: Positive for numbness.   All other systems reviewed and are negative.      Objective:      Physical Exam   Constitutional: He appears well-developed and well-nourished.   Cardiovascular:   Pulses:       Dorsalis pedis pulses are 1+ on the right side, and 1+ on the left side.        Posterior tibial pulses are 1+ on the right side, and 1+ on the left side.   Pulmonary/Chest: Effort normal.   Musculoskeletal: He exhibits edema and deformity.        Right foot: There is deformity.        Left foot: There is deformity.   Feet:   Right Foot:   Protective Sensation: 4 sites tested. 1 site sensed.  Left Foot:   Protective Sensation: 4 sites tested. 1 site sensed.  Neurological: He displays abnormal reflex.   Skin: Skin is warm. Capillary refill takes more than 3 seconds. There is erythema.   Psychiatric: He has a normal mood and affect. His behavior is normal. Judgment and thought content normal.   Nursing note and vitals reviewed.    On evaluation patient has areas of previously noted ingrown infected toenail on both big toes there is signs of fungal involvement some of the nail is growing into both the medial and lateral border which raises concern for infection there is some mild discomfort noted upon palpation. Patient does have history of currently displayed signs of interdigital maceration with signs of interdigital fungal involvement. Patient displays findings consistent with previously noted neuropathy  bilateral.    Assessment:       1. Paronychia of great toe, right    2. Tinea pedis of both feet    3. Ingrown nail    4. Idiopathic peripheral neuropathy        Plan:       Patient presents today stating he has a very painful right great toe patient has a chronic history of infected ingrown toenails on both big toes the inside of his right great toe has been bothering him for about a week.  The medial border of the patient's right hallux looks much better there had been an abscess underlying this area that was drained the redness swelling and discomfort in the area has completely resolved I am going to have him discontinue the antibiotic ointment that he was putting on the area and the dressing I have advised the patient to monitor this very closely he had several other nails that were showing signs of becoming ingrown these were debrided today and trimmed patient states he is doing good I have advised him we need to monitor this any increased redness swelling pain or discomfort he is to contact us immediately otherwise I will plan to see the patient as needed for follow-up.  Patient relates he is currently dealing with sciatic nerve pain on the left side which has made it very difficult for him to sleep her set for any period of time.  Total face-to-face time equaled 15 min.  Patient continues to suffer from sciatic and back problems.  This note was created using [employer] voice recognition software that occasionally misinterpreted phrases or words.

## 2020-01-31 ENCOUNTER — CLINICAL SUPPORT (OUTPATIENT)
Dept: REHABILITATION | Facility: HOSPITAL | Age: 85
End: 2020-01-31
Payer: MEDICARE

## 2020-01-31 DIAGNOSIS — M54.16 CHRONIC RADICULAR PAIN OF LOWER BACK: Primary | ICD-10-CM

## 2020-01-31 DIAGNOSIS — R26.81 GAIT INSTABILITY: ICD-10-CM

## 2020-01-31 DIAGNOSIS — G89.29 CHRONIC RADICULAR PAIN OF LOWER BACK: Primary | ICD-10-CM

## 2020-01-31 DIAGNOSIS — R26.89 BALANCE DISORDER: ICD-10-CM

## 2020-01-31 DIAGNOSIS — M54.50 LOW BACK PAIN: Primary | ICD-10-CM

## 2020-01-31 DIAGNOSIS — G60.9 IDIOPATHIC PERIPHERAL NEUROPATHY: ICD-10-CM

## 2020-01-31 DIAGNOSIS — R26.9 GAIT DISORDER: ICD-10-CM

## 2020-01-31 PROCEDURE — 97161 PT EVAL LOW COMPLEX 20 MIN: CPT | Mod: PN

## 2020-01-31 NOTE — PLAN OF CARE
[facility]  Physical Therapy Initial Evaluation    Name: Jeffrey Ocasio  Clinic Number: [id number]    Therapy Diagnosis:   Encounter Diagnoses   Name Primary?    Idiopathic peripheral neuropathy     Chronic radicular pain of lower back Yes    Gait instability      Physician: Cinthia Shrestha MD    Physician Orders: PT Eval and Treat   Medical Diagnosis: Falls, Chronic Lower back pain   Evaluation Date: 1/31/2020  Authorization period Expiration: 12/31/2020  Plan of Care Certification Period: 4/30/2020    Visit #: 1/ Visits authorized: 12  Time In:10:00 AM   Time Out: 11:00 AM   Total Billable Time: 60 minutes    Precautions: Standard and Fall    Subjective   Date of onset: 01/28/2020  Date of Surgery: N/A    Past Medical History:   Diagnosis Date    Allergy     Asthma      Jeffrey Ocasio  has a past surgical history that includes Knee surgery.    Jeffrey has a current medication list which includes the following prescription(s): aspirin, biotin, cholecalciferol (vitamin d3), cyclobenzaprine, escitalopram oxalate, finasteride, fish oil-omega-3 fatty acids, furosemide, gabapentin, glucosamine-chondroitin, klor-con m20, dryqvsoak-r8-crr35-algal oil, meclizine, melatonin, methocarbamol, methylprednisolone, multivitamin, naproxen, nitroglycerin, omeprazole, primidone, rosuvastatin, synthroid, and tamsulosin.    Review of patient's allergies indicates:   Allergen Reactions    Grass pollen           Prior Therapy: Jeffrey has been seen on several occasions for current condition  Social History: Patient lives in a single level home with 1 steps to enter - back deck to grass - 5 steps; lives with wife  Occupation: retired    Prior Level of Function: Fairly sedentary, ambulates with use of cane - more for safety than actual support; Independent at home; driving;   Current Level of Function: continues to be sedentary at home; has fallen a couple of times in the past 6 months; chronic  "lower back pain impedes his activity; driving; manages independently at home.       Pain: current 2/10, worst 6/10, best 0/10,   Aching, intermittent  Radicular symptoms: radiates occasionaly into LE's   Aggravating Factors: Standing, Walking, Lifting and Getting out of bed/chair  Easing Factors: rest      Onset/MGAO: gradual - previous history of lower back pain, neuropathy, gait instability     History of current condition - JEFFREY reports: that he fell this past Sunday 1/26/2020 - he was outside filling up the bird feeder; He reached up with feeder to hook it on the stand and lost his balance backwards and fell into the yard.  Jeffrey was able to crawl from the yard to the steps on deck; able to get himself up the steps on his buttocks and into the house, but he was not able to get himself up off of the floor. His wife called the fire department and then were able to assist him into standing.  Jeffrey has had balance problems as a result of his neuropathy for several years; as he ages, it has gotten worse; His lack of activity has also increased his risk of falling as has his postural deficits; Jeffrey has been to therapy on several occasions over the past several years and has done well, however he does not follow through with much in terms of exercise when the therapy is over - he does ride his stationary bike 2-3 times per week "most of the time" and does his best to help his wife with some of the daily house chores.     Pts goals: To improve my strength and balance       Objective     Observation: Jeffrey ambulated into the clinic - using standard cane with small basee; gait - flexed forward and trunk and flexion increases with length of time he is standing; decreased foot clearance noted bilaterally. Jeffrey is alert and oriented x 4; He does report some memory/word finding problems - again this has been present for about 1 year;     Posture:    -       Rounded shoulders  -       Forward head  -       Kyphosis  - " "      slumped posture    Gait: now using a straight cane with quad point. He has a tendency to slide his right foot as opposed to consistently clearing it on level surface.   He was able to ambualte a total of 500ft in 6 mins for a gait speed of  1.39 ft/sec        Range of Motion:   Cervical - loss of extension noted; rotation to 60 degrees  Thoracic:  Loss of extension; Rotation to 25 degrees on right/left  Lumbar: Loss of flexion and extension, but functional;   UE: WFL  LE: WFL      Upper Extremity Strength  (R) UE   (L) UE     Shoulder flexion: 4-/5 Shoulder flexion: 4/5   Shoulder Abduction: 4-/5 Shoulder abduction: 4/5   Shoulder ER 3+/5 Shoulder ER 4-/5   Shoulder IR 4-/5 Shoulder IR 4/5   Elbow Flexion 4+/5 Elbow Flexion 4+/5   Elbow Extension 4/5 Elbow Extension 4/5   Wrist Flexion 4/5 Wrist Flexion 4/5   Wrist Extension 4/5 Wrist Extension 4/5       equal right and left      Lower Extremity Strength  Right LE   Left LE     Knee extension: 4/5 Knee extension: 4+/5   Knee flexion: 4-/5 Knee flexion: 4-/5   Hip flexion: 4/5 Hip flexion: 4/5   Hip extension:  4-/5 Hip extension: 4-/5   Hip abduction: 3+/5 Hip abduction: 3+/5   Hip adduction: 3+/5 Hip adduction 3+/5   Ankle dorsiflexion: 3+/5 Ankle dorsiflexion: 4-/5   Ankle plantarflexion: 3/5 Ankle plantarflexion: 3/5      Fair Minus core muscle strength noted in abdominals and back extensors; - requires frequent cueing to get into more upright posture with standing/sitting:  Slouched/slumped posture; increases that longer he remains in a standing or sitting position.        Sensation: intact to light touch, but does not bilateral peripheral neuropathy      30 sec Sit <>Stand from 24" height:  8 reps; unable to rise from lower surface without the use of his UE's.     Flexibility:     90/90 SLR = R moderate restriction, L moderate restriction              Papi test: R minimal restriction, L moderate restriction      Functional Limitations Reports - G " Codes  Category: Mobility, Body position, Carrying, Self care, Other  Tool: LEFS   Score: 75% limitation     PT Evaluation Completed: Yes  Discussed Plan of Care with patient: Yes    TREATMENT:    Home Exercises and Patient Education Provided  Education provided re:   - progress towards goals   - role of therapy in multi - disciplinary team, goals for therapy  Pt educated on condition, POC, and expectations in therapy.  No spiritual or educational barriers to learning provided    Home exercises:  Pt will be provided HEP during course of treatment with progressions as appropriate. Pt was advised to perform these exercises free of pain, and to stop performing them if pain occurs.   JEFFREY demonstrated good  understanding of the education provided.     Assessment     Jeffrey is a 86 y.o. male referred to outpatient physical therapy and presents to PT with gait instability and chronic lower back pain  . Patient demonstrates limitations as described in the problem list. Pt will benefit from physcial therapy services in order to maximize his functional mobility and decrease fall risk. The following goals were discussed with the patient and patient is in agreement with them as to be addressed in the treatment plan.     Anticipated barriers to physical therapy: none    Medical necessity is demonstrated by the following IMPAIRMENTS/PROBLEM LIST:    weakness, impaired endurance, impaired sensation, impaired functional mobility, gait instability, impaired balance and pain        GOALS:    Long Term Goals: 8 weeks    Pain: Maintain pain levels to no more than 2-3/10 to allow for improved ability to perform daily activities   Strength: Improve strength in core muscles to at least 3+/5 for improved lumbopelvic stability and to decrease risk of falls   ROM: Maintain functional ROM to WFL   Functional scale: Improve score on LEFS  to <50% limitation   Walking: Increase walking distance and/or duration to 1000ft without pain - able  to improve ft/sec time to at least 1.6 ft/sec  Postures: Increase sitting and/or standing duration to 15 mins without pain   Transfers: Perform all transfers without increased pain or limitation  Exercise: demonstrate independence with home exercise program to maintain gains made in therapy.        Plan     Certification Period: 1/31/2020 to 04/30/2020.    Outpatient physical therapy 2 times weekly to include: Gait Training, Neuromuscular Re-ed, Patient Education and Therapeutic Exercise. Cont PT for 8 weeks.   Pt may be seen by PTA as part of the rehabilitation team.     I certify the need for these services furnished under this plan of treatment and while under my care.    Dena Bauman, PT          Attestation:   I have seen the patient, reviewed the therapist's plan of care, and I agree with the plan of care.   I certify the need for these services furnished under this plan of treatment and while under my care.         _______________            ________                                               _____________________  Physician/Referring Practitioner                                                            Date of Signature

## 2020-02-04 ENCOUNTER — CLINICAL SUPPORT (OUTPATIENT)
Dept: REHABILITATION | Facility: HOSPITAL | Age: 85
End: 2020-02-04
Attending: INTERNAL MEDICINE
Payer: MEDICARE

## 2020-02-04 DIAGNOSIS — R26.81 GAIT INSTABILITY: Primary | ICD-10-CM

## 2020-02-04 DIAGNOSIS — M62.81 MUSCLE WEAKNESS (GENERALIZED): ICD-10-CM

## 2020-02-04 PROCEDURE — 97110 THERAPEUTIC EXERCISES: CPT | Mod: PN

## 2020-02-04 PROCEDURE — 97112 NEUROMUSCULAR REEDUCATION: CPT | Mod: PN

## 2020-02-04 NOTE — PROGRESS NOTES
Physical Therapy Daily Note     Name: Jeffrey Ocasio  Clinic Number: [id number]  Diagnosis:   Encounter Diagnoses   Name Primary?    Gait instability Yes    Muscle weakness (generalized)      Physician: Cinthia Shrestha MD  Precautions: standard, fall   Visit #: 1 of 12  PTA Visit #: 0  Time In: 2:50 PM   Time Out: 4:00 PM     Subjective     Pt reports: Jeffrey noting no new c/o's  - lower back bothering him some today   Pain Scale: Jeffrey rates pain on a scale of 0-10 to be 5 currently.    Objective     Jeffrey received individual therapeutic exercises to develop strength, posture and core stabilization for 45 minutes including:  Nu-Step x 20 mins - Level 7  Static Standing x 2 mins on Air-Ex mat - tactile cueing at lumbar spine and left anterior shoulder   Toe-Taps x 2 mins - tactile cueing at lumbar spine and left anterior shoulder for posture   Bridging x 20  H/S Stretching - BLE  SLR x 15   S/L Lumbar rotation   Clams     Jeffrey received the following manual therapy techniques:  were applied to the:  for  minutes including:       The patient received the following direct contact modalities after being cleared for contraindications:     The patient received the following supervised modalities after being cleared for contradictions:     Written Home Exercises Provided:   Encouraged riding recumbent bike at home daily x 15-20 mins   Pt demo good understanding of the education provided. Jeffrey demonstrated good return demonstration of activities.     Education provided re:  Jeffrey verbalized good understanding of education provided.   No spiritual or educational barriers to learning provided    Assessment     Patient tolerated treatment well; requires frequent verbal/tactile cueing for posture with all standing activities. Right shoulder pain today - going to see ortho on Monday - this is chronic problem for Jeffrey.   This is a 86 y.o. male referred to outpatient  physical therapy and presents with a medical diagnosis of falls, lower back pain  and demonstrates limitations as described in the problem list. Pt prognosis is Good. Pt will continue to benefit from skilled outpatient physical therapy to address the deficits listed in the problem list, provide pt/family education and to maximize pt's level of independence in the home and community environment.     Goals as follows:    Long Term Goals: 8 weeks     Pain: Maintain pain levels to no more than 2-3/10 to allow for improved ability to perform daily activities   Strength: Improve strength in core muscles to at least 3+/5 for improved lumbopelvic stability and to decrease risk of falls   ROM: Maintain functional ROM to [facility]   Functional scale: Improve score on LEFS  to <50% limitation   Walking: Increase walking distance and/or duration to 1000ft without pain - able to improve ft/sec time to at least 1.6 ft/sec  Postures: Increase sitting and/or standing duration to 15 mins without pain   Transfers: Perform all transfers without increased pain or limitation  Exercise: demonstrate independence with home exercise program to maintain gains made in therapy.       Plan     Continue with established Plan of Care towards PT goals.    Therapist: Dena Bauman, PT  2/4/2020

## 2020-02-06 ENCOUNTER — CLINICAL SUPPORT (OUTPATIENT)
Dept: REHABILITATION | Facility: HOSPITAL | Age: 85
End: 2020-02-06
Attending: INTERNAL MEDICINE
Payer: MEDICARE

## 2020-02-06 DIAGNOSIS — R26.81 GAIT INSTABILITY: Primary | ICD-10-CM

## 2020-02-06 DIAGNOSIS — M62.81 MUSCLE WEAKNESS (GENERALIZED): ICD-10-CM

## 2020-02-06 PROCEDURE — 97110 THERAPEUTIC EXERCISES: CPT | Mod: PN,CQ

## 2020-02-06 NOTE — PROGRESS NOTES
Physical Therapy Daily Note     Name: Jeffrey Ocasio  Clinic Number: [id number]  Diagnosis:   Encounter Diagnoses   Name Primary?    Gait instability Yes    Muscle weakness (generalized)      Physician: Cinthia Shrestha MD  Precautions: standard, fall   Visit #: 2 of 12  PTA Visit #: 1  Time In: 1:55 PM   Time Out: 3:00 PM     Subjective     Pt reports: Jeffrey noting no new c/o's  - lower back bothering him some today   Pain Scale: Jeffrey rates pain on a scale of 0-10 to be 5 currently.    Objective     Jeffrey received individual therapeutic exercises to develop strength, posture and core stabilization for 45 minutes including:  Nu-Step x 25 mins - Level 6  Static Standing x 2 mins on Air-Ex mat - tactile cueing at lumbar spine and left anterior shoulder   Toe-Taps x 2 mins - tactile cueing at lumbar spine and left anterior shoulder for posture   Bridging x 20  H/S Stretching - BLE 5 x 15 sec hold  SLR x 15   S/L Lumbar rotation x 10  Clams x 15  S/L Hip Abd x 10    Jeffrey received the following manual therapy techniques:  were applied to the:  for  minutes including:       The patient received the following direct contact modalities after being cleared for contraindications:     The patient received the following supervised modalities after being cleared for contradictions:     Written Home Exercises Provided:   Encouraged riding recumbent bike at home daily x 15-20 mins   Pt demo good understanding of the education provided. Jeffrey demonstrated good return demonstration of activities.     Education provided re:  Jeffrey verbalized good understanding of education provided.   No spiritual or educational barriers to learning provided    Assessment     Patient tolerated treatment well; requires frequent verbal/tactile cueing for posture with all standing activities. Right shoulder pain today - going to see ortho on Monday - this is chronic problem for Jeffrey.   This  is a 86 y.o. male referred to outpatient physical therapy and presents with a medical diagnosis of falls, lower back pain  and demonstrates limitations as described in the problem list. Pt prognosis is Good. Pt will continue to benefit from skilled outpatient physical therapy to address the deficits listed in the problem list, provide pt/family education and to maximize pt's level of independence in the home and community environment.     Goals as follows:    Long Term Goals: 8 weeks     Pain: Maintain pain levels to no more than 2-3/10 to allow for improved ability to perform daily activities   Strength: Improve strength in core muscles to at least 3+/5 for improved lumbopelvic stability and to decrease risk of falls   ROM: Maintain functional ROM to [facility]   Functional scale: Improve score on LEFS  to <50% limitation   Walking: Increase walking distance and/or duration to 1000ft without pain - able to improve ft/sec time to at least 1.6 ft/sec  Postures: Increase sitting and/or standing duration to 15 mins without pain   Transfers: Perform all transfers without increased pain or limitation  Exercise: demonstrate independence with home exercise program to maintain gains made in therapy.       Plan     Continue with established Plan of Care towards PT goals.    Therapist: Jonathan Favre, PTA  2/6/2020

## 2020-02-10 ENCOUNTER — CLINICAL SUPPORT (OUTPATIENT)
Dept: REHABILITATION | Facility: HOSPITAL | Age: 85
End: 2020-02-10
Attending: INTERNAL MEDICINE
Payer: MEDICARE

## 2020-02-10 DIAGNOSIS — R26.81 GAIT INSTABILITY: Primary | ICD-10-CM

## 2020-02-10 PROCEDURE — 97110 THERAPEUTIC EXERCISES: CPT | Mod: PN,CQ

## 2020-02-10 NOTE — PROGRESS NOTES
Physical Therapy Daily Note     Name: Jeffrey Ocasio  [facility] Number: [id number]  Diagnosis:   Encounter Diagnosis   Name Primary?    Gait instability Yes     Physician: Cinthia Shrestha MD  Precautions: standard, fall   Visit #: 3 of 12  PTA Visit #: 2  Time In: 12:55 PM   Time Out: 2:00 PM     Subjective     Pt reports: No new c/o's; I got a cortisone injection in my right shoulder on Friday.  Pain Scale: Jeffrey rates pain on a scale of 0-10 to be 0 currently.    Objective     Jeffrey received individual therapeutic exercises to develop strength, posture and core stabilization for 45 minutes including:  Nu-Step x 30 mins - Level 6  Static Standing x 2 mins on Air-Ex mat - tactile cueing at lumbar spine and left anterior shoulder   Toe-Taps x 2 mins - tactile cueing at lumbar spine and left anterior shoulder for posture   Bridging x 20  Ball Squeezes x 3 mins  H/S Stretching - BLE 5 x 15 sec hold  SLR x 10  S/L Lumbar rotation x 10  Clams x 15  S/L Hip Abd x 10    Jeffrey received the following manual therapy techniques:  were applied to the:  for  minutes including:       The patient received the following direct contact modalities after being cleared for contraindications:     The patient received the following supervised modalities after being cleared for contradictions:     Written Home Exercises Provided:   Encouraged riding recumbent bike at home daily x 15-20 mins   Pt demo good understanding of the education provided. Jeffrey demonstrated good return demonstration of activities.     Education provided re:  Jeffrey verbalized good understanding of education provided.   No spiritual or educational barriers to learning provided    Assessment     Patient tolerated treatment well; requires frequent verbal/tactile cueing for posture with all standing activities. Right shoulder pain today - going to see ortho on Monday - this is chronic problem for Jeffrey.   This is  a 86 y.o. male referred to outpatient physical therapy and presents with a medical diagnosis of falls, lower back pain  and demonstrates limitations as described in the problem list. Pt prognosis is Good. Pt will continue to benefit from skilled outpatient physical therapy to address the deficits listed in the problem list, provide pt/family education and to maximize pt's level of independence in the home and community environment.     Goals as follows:    Long Term Goals: 8 weeks     Pain: Maintain pain levels to no more than 2-3/10 to allow for improved ability to perform daily activities   Strength: Improve strength in core muscles to at least 3+/5 for improved lumbopelvic stability and to decrease risk of falls   ROM: Maintain functional ROM to [facility]   Functional scale: Improve score on LEFS  to <50% limitation   Walking: Increase walking distance and/or duration to 1000ft without pain - able to improve ft/sec time to at least 1.6 ft/sec  Postures: Increase sitting and/or standing duration to 15 mins without pain   Transfers: Perform all transfers without increased pain or limitation  Exercise: demonstrate independence with home exercise program to maintain gains made in therapy.       Plan     Continue with established Plan of Care towards PT goals.    Therapist: Jonathan Favre, PTA  2/10/2020

## 2020-02-13 ENCOUNTER — CLINICAL SUPPORT (OUTPATIENT)
Dept: REHABILITATION | Facility: HOSPITAL | Age: 85
End: 2020-02-13
Attending: INTERNAL MEDICINE
Payer: MEDICARE

## 2020-02-13 DIAGNOSIS — R26.81 GAIT INSTABILITY: Primary | ICD-10-CM

## 2020-02-13 PROCEDURE — 97110 THERAPEUTIC EXERCISES: CPT | Mod: PN,CQ

## 2020-02-13 NOTE — PROGRESS NOTES
Physical Therapy Daily Note     Name: Jeffrey Ocasio  [facility] Number: [id number]  Diagnosis:   Encounter Diagnosis   Name Primary?    Gait instability Yes     Physician: Cinthia Shrestha MD  Precautions: standard, fall   Visit #: 4 of 12  PTA Visit #: 3  Time In: 12:50 PM   Time Out: 2:00 PM     Subjective     Pt reports: No new c/o's;   Pain Scale: Jeffrey rates pain on a scale of 0-10 to be 0 currently.    Objective     Jeffrey received individual therapeutic exercises to develop strength, posture and core stabilization for 45 minutes including:  Nu-Step x 30 mins - Level 6  Static Standing x 2 mins on Air-Ex mat - tactile cueing at lumbar spine and left anterior shoulder   Toe-Taps x 2 mins - tactile cueing at lumbar spine and left anterior shoulder for posture   Bridging x 20  Ball Squeezes x 3 mins  H/S Stretching - BLE 5 x 15 sec hold  SLR x 10  S/L Lumbar rotation x 10  Clams x 15  S/L Hip Abd x 10    Jeffrey received the following manual therapy techniques:  were applied to the:  for  minutes including:       The patient received the following direct contact modalities after being cleared for contraindications:     The patient received the following supervised modalities after being cleared for contradictions:     Written Home Exercises Provided:   Encouraged riding recumbent bike at home daily x 15-20 mins   Pt demo good understanding of the education provided. Jeffrey demonstrated good return demonstration of activities.     Education provided re:  Jeffrey verbalized good understanding of education provided.   No spiritual or educational barriers to learning provided    Assessment     Patient tolerated treatment well; requires frequent verbal/tactile cueing for posture with all standing activities. Right shoulder pain today - going to see ortho on Monday - this is chronic problem for Jeffrey.   This is a 86 y.o. male referred to outpatient physical therapy and  presents with a medical diagnosis of falls, lower back pain  and demonstrates limitations as described in the problem list. Pt prognosis is Good. Pt will continue to benefit from skilled outpatient physical therapy to address the deficits listed in the problem list, provide pt/family education and to maximize pt's level of independence in the home and community environment.     Goals as follows:    Long Term Goals: 8 weeks     Pain: Maintain pain levels to no more than 2-3/10 to allow for improved ability to perform daily activities   Strength: Improve strength in core muscles to at least 3+/5 for improved lumbopelvic stability and to decrease risk of falls   ROM: Maintain functional ROM to [facility]   Functional scale: Improve score on LEFS  to <50% limitation   Walking: Increase walking distance and/or duration to 1000ft without pain - able to improve ft/sec time to at least 1.6 ft/sec  Postures: Increase sitting and/or standing duration to 15 mins without pain   Transfers: Perform all transfers without increased pain or limitation  Exercise: demonstrate independence with home exercise program to maintain gains made in therapy.       Plan     Continue with established Plan of Care towards PT goals.    Therapist: Jonathan Favre, PTA  2/13/2020

## 2020-02-21 ENCOUNTER — CLINICAL SUPPORT (OUTPATIENT)
Dept: REHABILITATION | Facility: HOSPITAL | Age: 85
End: 2020-02-21
Attending: INTERNAL MEDICINE
Payer: MEDICARE

## 2020-02-21 DIAGNOSIS — M62.81 MUSCLE WEAKNESS (GENERALIZED): ICD-10-CM

## 2020-02-21 DIAGNOSIS — R26.81 GAIT INSTABILITY: Primary | ICD-10-CM

## 2020-02-21 PROCEDURE — 97110 THERAPEUTIC EXERCISES: CPT | Mod: PN,CQ

## 2020-02-21 NOTE — PROGRESS NOTES
Physical Therapy Daily Note     Name: Jeffrey Ocasio  [facility] Number: [id number]  Diagnosis:   Encounter Diagnoses   Name Primary?    Gait instability Yes    Muscle weakness (generalized)      Physician: Cinthia Shrestha MD  Precautions: standard, fall   Visit #: 5 of 12  PTA Visit #: 4  Time In: 12:50 PM   Time Out: 2:00 PM     Subjective     Pt reports: No new c/o's;   Pain Scale: Jeffrey rates pain on a scale of 0-10 to be 0 currently.    Objective     Jeffrey received individual therapeutic exercises to develop strength, posture and core stabilization for 45 minutes including:  Nu-Step x 30 mins - Level 6  Static Standing x 2 mins on Air-Ex mat - tactile cueing at lumbar spine and left anterior shoulder   Toe-Taps x 2 mins - tactile cueing at lumbar spine and left anterior shoulder for posture   Bridging x 20  Ball Squeezes x 3 mins  DKC with SB x 3 mins  Lumbar Rolls with SB x 3 mins  H/S Stretching - BLE 5 x 15 sec hold  SLR x 10  S/L Lumbar rotation x 10  Clams x 15  S/L Hip Abd x 10    Jeffrey received the following manual therapy techniques:  were applied to the:  for  minutes including:       The patient received the following direct contact modalities after being cleared for contraindications:     The patient received the following supervised modalities after being cleared for contradictions:     Written Home Exercises Provided:   Encouraged riding recumbent bike at home daily x 15-20 mins   Pt demo good understanding of the education provided. Jeffrey demonstrated good return demonstration of activities.     Education provided re:  Jeffrey verbalized good understanding of education provided.   No spiritual or educational barriers to learning provided    Assessment     Patient tolerated treatment well; requires frequent verbal/tactile cueing for posture with all standing activities. Right shoulder pain today - going to see ortho on Monday - this is chronic  problem for Jeffrey.   This is a 86 y.o. male referred to outpatient physical therapy and presents with a medical diagnosis of falls, lower back pain  and demonstrates limitations as described in the problem list. Pt prognosis is Good. Pt will continue to benefit from skilled outpatient physical therapy to address the deficits listed in the problem list, provide pt/family education and to maximize pt's level of independence in the home and community environment.     Goals as follows:    Long Term Goals: 8 weeks     Pain: Maintain pain levels to no more than 2-3/10 to allow for improved ability to perform daily activities   Strength: Improve strength in core muscles to at least 3+/5 for improved lumbopelvic stability and to decrease risk of falls   ROM: Maintain functional ROM to [facility]   Functional scale: Improve score on LEFS  to <50% limitation   Walking: Increase walking distance and/or duration to 1000ft without pain - able to improve ft/sec time to at least 1.6 ft/sec  Postures: Increase sitting and/or standing duration to 15 mins without pain   Transfers: Perform all transfers without increased pain or limitation  Exercise: demonstrate independence with home exercise program to maintain gains made in therapy.       Plan     Continue with established Plan of Care towards PT goals.    Therapist: Jonathan Favre, PTA  2/21/2020

## 2020-02-24 ENCOUNTER — CLINICAL SUPPORT (OUTPATIENT)
Dept: REHABILITATION | Facility: HOSPITAL | Age: 85
End: 2020-02-24
Attending: INTERNAL MEDICINE
Payer: MEDICARE

## 2020-02-24 DIAGNOSIS — M62.81 MUSCLE WEAKNESS (GENERALIZED): Primary | ICD-10-CM

## 2020-02-24 PROCEDURE — 97110 THERAPEUTIC EXERCISES: CPT | Mod: PN

## 2020-02-24 NOTE — PROGRESS NOTES
Physical Therapy Daily Note     Name: Jeffrey Ocasio  [facility] Number: [id number]  Diagnosis:   Encounter Diagnosis   Name Primary?    Muscle weakness (generalized) Yes     Physician: Cinthia Shrestha MD  Precautions: standard, fall   Visit #: 6 of 12  PTA Visit #: 4  Time In: 2:00 PM   Time Out:  3:00 PM     Subjective     Pt reports: Jeffrey said that he went straight to urgent care clinic after last treatment for sinus/cold symptoms.  Got a shot and some medicine and is finally starting to feel better.   Pain Scale: Jeffrey rates pain on a scale of 0-10 to be 1-2 currently.  Lower back     Objective     Jeffrey received individual therapeutic exercises to develop strength, posture and core stabilization for 45 minutes including:  Nu-Step x 30 mins - Level 6  Static Standing x 2 mins on Air-Ex mat - tactile cueing at lumbar spine and left anterior shoulder   Toe-Taps x 2 mins - tactile cueing at lumbar spine and left anterior shoulder for posture   Bridging x 20  Ball Squeezes x 3 mins  DKC with SB x 3 mins  Lumbar Rolls with SB x 3 mins  H/S Stretching - BLE 5 x 15 sec hold  SLR x 10  S/L Lumbar rotation x 10  Clams x 15  S/L Hip Abd x 10    Jeffrey received the following manual therapy techniques:  were applied to the:  for  minutes including:       The patient received the following direct contact modalities after being cleared for contraindications:     The patient received the following supervised modalities after being cleared for contradictions:     Written Home Exercises Provided:   Encouraged riding recumbent bike at home daily x 15-20 mins   Pt demo good understanding of the education provided. Jeffrey demonstrated good return demonstration of activities.     Education provided re:  Jeffrey verbalized good understanding of education provided.   No spiritual or educational barriers to learning provided    Assessment     Patient tolerated treatment well;  requires frequent verbal/tactile cueing for posture with all standing activities, but is very compliant with all exercises and cueing.   This is a 86 y.o. male referred to outpatient physical therapy and presents with a medical diagnosis of falls, lower back pain  and demonstrates limitations as described in the problem list. Pt prognosis is Good. Pt will continue to benefit from skilled outpatient physical therapy to address the deficits listed in the problem list, provide pt/family education and to maximize pt's level of independence in the home and community environment.     Goals as follows:    Long Term Goals: 8 weeks     Pain: Maintain pain levels to no more than 2-3/10 to allow for improved ability to perform daily activities   Strength: Improve strength in core muscles to at least 3+/5 for improved lumbopelvic stability and to decrease risk of falls   ROM: Maintain functional ROM to [facility]   Functional scale: Improve score on LEFS  to <50% limitation   Walking: Increase walking distance and/or duration to 1000ft without pain - able to improve ft/sec time to at least 1.6 ft/sec  Postures: Increase sitting and/or standing duration to 15 mins without pain   Transfers: Perform all transfers without increased pain or limitation  Exercise: demonstrate independence with home exercise program to maintain gains made in therapy.       Plan     Continue with established Plan of Care towards PT goals.    Therapist: Dena Bauman, PT  2/24/2020

## 2020-02-27 ENCOUNTER — CLINICAL SUPPORT (OUTPATIENT)
Dept: REHABILITATION | Facility: HOSPITAL | Age: 85
End: 2020-02-27
Attending: INTERNAL MEDICINE
Payer: MEDICARE

## 2020-02-27 DIAGNOSIS — R26.81 GAIT INSTABILITY: Primary | ICD-10-CM

## 2020-02-27 PROCEDURE — 97110 THERAPEUTIC EXERCISES: CPT | Mod: PN,CQ

## 2020-02-27 NOTE — PROGRESS NOTES
Physical Therapy Daily Note     [id number]  Diagnosis:   Encounter Diagnosis   Name Primary?    Gait instability Yes     Physician: Cinthia Shrestha MD  Precautions: standard, fall   Visit #: 7 of 12  PTA Visit #: 1  Time In: 1:00 PM   Time Out:  2:10 PM     Subjective     Pt reports: No new c/o's.  Pain Scale: Jeffrey rates pain on a scale of 0-10 to be 3 currently.  Lower back     Objective     Jeffrey received individual therapeutic exercises to develop strength, posture and core stabilization for 45 minutes including:  Nu-Step x 30 mins - Level 6  Static Standing x 2 mins on Air-Ex mat - tactile cueing at lumbar spine and left anterior shoulder   Toe-Taps x 2 mins - tactile cueing at lumbar spine and left anterior shoulder for posture   Bridging x 20  Ball Squeezes x 3 mins  DKC with SB x 3 mins  Lumbar Rolls with SB x 3 mins  H/S Stretching - BLE 5 x 15 sec hold  SLR x 10  S/L Lumbar rotation x 10  Clams x 15  S/L Hip Abd x 10    Jeffrey received the following manual therapy techniques:  were applied to the:  for  minutes including:       The patient received the following direct contact modalities after being cleared for contraindications:     The patient received the following supervised modalities after being cleared for contradictions:     Written Home Exercises Provided:   Encouraged riding recumbent bike at home daily x 15-20 mins   Pt demo good understanding of the education provided. Jeffrey demonstrated good return demonstration of activities.     Education provided re:  Jeffrey verbalized good understanding of education provided.   No spiritual or educational barriers to learning provided    Assessment     Patient tolerated treatment well; requires frequent verbal/tactile cueing for posture with all standing activities, but is very compliant with all exercises and cueing.   This is a 86 y.o. male referred to  outpatient physical therapy and presents with a medical diagnosis of falls, lower back pain  and demonstrates limitations as described in the problem list. Pt prognosis is Good. Pt will continue to benefit from skilled outpatient physical therapy to address the deficits listed in the problem list, provide pt/family education and to maximize pt's level of independence in the home and community environment.     Goals as follows:    Long Term Goals: 8 weeks     Pain: Maintain pain levels to no more than 2-3/10 to allow for improved ability to perform daily activities   Strength: Improve strength in core muscles to at least 3+/5 for improved lumbopelvic stability and to decrease risk of falls   ROM: Maintain functional ROM to [facility]   Functional scale: Improve score on LEFS  to <50% limitation   Walking: Increase walking distance and/or duration to 1000ft without pain - able to improve ft/sec time to at least 1.6 ft/sec  Postures: Increase sitting and/or standing duration to 15 mins without pain   Transfers: Perform all transfers without increased pain or limitation  Exercise: demonstrate independence with home exercise program to maintain gains made in therapy.       Plan     Continue with established Plan of Care towards PT goals.    Therapist: Jonathan Favre, PTA  2/27/2020

## 2020-03-03 ENCOUNTER — CLINICAL SUPPORT (OUTPATIENT)
Dept: REHABILITATION | Facility: HOSPITAL | Age: 85
End: 2020-03-03
Attending: INTERNAL MEDICINE
Payer: MEDICARE

## 2020-03-03 DIAGNOSIS — R26.81 GAIT INSTABILITY: Primary | ICD-10-CM

## 2020-03-03 PROCEDURE — 97110 THERAPEUTIC EXERCISES: CPT | Mod: PN,CQ

## 2020-03-03 NOTE — PROGRESS NOTES
Physical Therapy Daily Note     [id number]  Diagnosis:   Encounter Diagnosis   Name Primary?    Gait instability Yes     Physician: Cinthia Shrestha MD  Precautions: standard, fall   Visit #: 8 of 12  PTA Visit #: 2  Time In: 1:00 PM   Time Out:  2:20 PM     Subjective     Pt reports: No new c/o's.  Pain Scale: Jeffrey rates pain on a scale of 0-10 to be 3 currently.  Lower back     Objective     Jeffrey received individual therapeutic exercises to develop strength, posture and core stabilization for 45 minutes including:  Nu-Step x 30 mins - Level 6  Static Standing x 2 mins on Air-Ex mat - tactile cueing at lumbar spine and left anterior shoulder   Toe-Taps x 2 mins - tactile cueing at lumbar spine and left anterior shoulder for posture   Bridging x 20  Ball Squeezes x 3 mins  DKC with SB x 3 mins  Lumbar Rolls with SB x 3 mins  H/S Stretching - BLE 5 x 15 sec hold  SLR x 10  S/L Lumbar rotation x 10  Clams x 15  S/L Hip Abd x 10    Jeffrey received the following manual therapy techniques:  were applied to the:  for  minutes including:       The patient received the following direct contact modalities after being cleared for contraindications:     The patient received the following supervised modalities after being cleared for contradictions:     Written Home Exercises Provided:   Encouraged riding recumbent bike at home daily x 15-20 mins   Pt demo good understanding of the education provided. Jeffrey demonstrated good return demonstration of activities.     Education provided re:  Jeffrey verbalized good understanding of education provided.   No spiritual or educational barriers to learning provided    Assessment     Patient tolerated treatment well; requires frequent verbal/tactile cueing for posture with all standing activities, but is very compliant with all exercises and cueing.   This is a 86 y.o. male referred to  outpatient physical therapy and presents with a medical diagnosis of falls, lower back pain  and demonstrates limitations as described in the problem list. Pt prognosis is Good. Pt will continue to benefit from skilled outpatient physical therapy to address the deficits listed in the problem list, provide pt/family education and to maximize pt's level of independence in the home and community environment.     Goals as follows:    Long Term Goals: 8 weeks     Pain: Maintain pain levels to no more than 2-3/10 to allow for improved ability to perform daily activities   Strength: Improve strength in core muscles to at least 3+/5 for improved lumbopelvic stability and to decrease risk of falls   ROM: Maintain functional ROM to [facility]   Functional scale: Improve score on LEFS  to <50% limitation   Walking: Increase walking distance and/or duration to 1000ft without pain - able to improve ft/sec time to at least 1.6 ft/sec  Postures: Increase sitting and/or standing duration to 15 mins without pain   Transfers: Perform all transfers without increased pain or limitation  Exercise: demonstrate independence with home exercise program to maintain gains made in therapy.       Plan     Continue with established Plan of Care towards PT goals.    Therapist: Jonathan Favre, PTA  3/3/2020

## 2020-03-06 ENCOUNTER — CLINICAL SUPPORT (OUTPATIENT)
Dept: REHABILITATION | Facility: HOSPITAL | Age: 85
End: 2020-03-06
Attending: INTERNAL MEDICINE
Payer: MEDICARE

## 2020-03-06 DIAGNOSIS — R26.81 GAIT INSTABILITY: Primary | ICD-10-CM

## 2020-03-06 PROCEDURE — 97110 THERAPEUTIC EXERCISES: CPT | Mod: PN,CQ

## 2020-03-06 NOTE — PROGRESS NOTES
Physical Therapy Daily Note     [id number]  Diagnosis:   Encounter Diagnosis   Name Primary?    Gait instability Yes     Physician: Cinthia Shrestha MD  Precautions: standard, fall   Visit #: 9 of 12  PTA Visit #: 3  Time In: 1:00 PM   Time Out:  2:05 PM     Subjective     Pt reports: No new c/o's.  Pain Scale: Jeffrey rates pain on a scale of 0-10 to be 4 currently.  Lower back     Objective     Jeffrey received individual therapeutic exercises to develop strength, posture and core stabilization for 45 minutes including:  Nu-Step x 30 mins - Level 6  Static Standing x 2 mins on Air-Ex mat - tactile cueing at lumbar spine and left anterior shoulder   Toe-Taps x 2 mins - tactile cueing at lumbar spine and left anterior shoulder for posture   Bridging x 20  Ball Squeezes x 3 mins  DKC with SB x 3 mins  Lumbar Rolls with SB x 3 mins  H/S Stretching - BLE 5 x 15 sec hold  SLR x 10  S/L Lumbar rotation x 10  Clams x 15  S/L Hip Abd x 10    Jeffrey received the following manual therapy techniques:  were applied to the:  for  minutes including:       The patient received the following direct contact modalities after being cleared for contraindications:     The patient received the following supervised modalities after being cleared for contradictions:     Written Home Exercises Provided:   Encouraged riding recumbent bike at home daily x 15-20 mins   Pt demo good understanding of the education provided. Jeffrey demonstrated good return demonstration of activities.     Education provided re:  Jeffrey verbalized good understanding of education provided.   No spiritual or educational barriers to learning provided    Assessment     Patient tolerated treatment well; requires frequent verbal/tactile cueing for posture with all standing activities, but is very compliant with all exercises and cueing.   This is a 87 y.o. male referred to  outpatient physical therapy and presents with a medical diagnosis of falls, lower back pain  and demonstrates limitations as described in the problem list. Pt prognosis is Good. Pt will continue to benefit from skilled outpatient physical therapy to address the deficits listed in the problem list, provide pt/family education and to maximize pt's level of independence in the home and community environment.     Goals as follows:    Long Term Goals: 8 weeks     Pain: Maintain pain levels to no more than 2-3/10 to allow for improved ability to perform daily activities   Strength: Improve strength in core muscles to at least 3+/5 for improved lumbopelvic stability and to decrease risk of falls   ROM: Maintain functional ROM to [facility]   Functional scale: Improve score on LEFS  to <50% limitation   Walking: Increase walking distance and/or duration to 1000ft without pain - able to improve ft/sec time to at least 1.6 ft/sec  Postures: Increase sitting and/or standing duration to 15 mins without pain   Transfers: Perform all transfers without increased pain or limitation  Exercise: demonstrate independence with home exercise program to maintain gains made in therapy.       Plan     Continue with established Plan of Care towards PT goals.    Therapist: Jonathan Favre, PTA  3/6/2020

## 2020-03-10 ENCOUNTER — CLINICAL SUPPORT (OUTPATIENT)
Dept: REHABILITATION | Facility: HOSPITAL | Age: 85
End: 2020-03-10
Attending: INTERNAL MEDICINE
Payer: MEDICARE

## 2020-03-10 DIAGNOSIS — R26.81 GAIT INSTABILITY: Primary | ICD-10-CM

## 2020-03-10 DIAGNOSIS — M62.81 MUSCLE WEAKNESS (GENERALIZED): ICD-10-CM

## 2020-03-10 PROCEDURE — 97110 THERAPEUTIC EXERCISES: CPT | Mod: PN,CQ

## 2020-03-10 NOTE — PROGRESS NOTES
Physical Therapy Daily Note     Name: Jeffrey Ocasio  [facility] Number: [id number]  Diagnosis:   Encounter Diagnoses   Name Primary?    Gait instability Yes    Muscle weakness (generalized)      Physician: Cinthia Shrestha MD  Precautions: standard, fall   Visit #: 10 of 12  PTA Visit #: 4  Time In: 1:00 PM   Time Out:  2:00 PM     Subjective     Pt reports: No new c/o's.  Pain Scale: Jeffrey rates pain on a scale of 0-10 to be 5-6 currently. Lower back     Objective     Jeffrey received individual therapeutic exercises to develop strength, posture and core stabilization for 45 minutes including:  Nu-Step x 30 mins - Level 6  Static Standing x 2 mins on Air-Ex mat - tactile cueing at lumbar spine and left anterior shoulder   Toe-Taps x 2 mins - tactile cueing at lumbar spine and left anterior shoulder for posture   Bridging x 20  Ball Squeezes x 3 mins  DKC with SB x 3 mins  Lumbar Rolls with SB x 3 mins  H/S Stretching - BLE 5 x 15 sec hold  SLR x 10  S/L Lumbar rotation x 10  Clams x 15  S/L Hip Abd x 10    Jeffrey received the following manual therapy techniques:  were applied to the:  for  minutes including:       The patient received the following direct contact modalities after being cleared for contraindications:     The patient received the following supervised modalities after being cleared for contradictions:     Written Home Exercises Provided:   Encouraged riding recumbent bike at home daily x 15-20 mins   Pt demo good understanding of the education provided. Jeffrey demonstrated good return demonstration of activities.     Education provided re:  Jeffrey verbalized good understanding of education provided.   No spiritual or educational barriers to learning provided    Assessment     Patient tolerated treatment fairly well;fatigued today; requires frequent verbal/tactile cueing for posture with all standing activities, but is very compliant with all exercises  and cueing.   This is a 87 y.o. male referred to outpatient physical therapy and presents with a medical diagnosis of falls, lower back pain  and demonstrates limitations as described in the problem list. Pt prognosis is Good. Pt will continue to benefit from skilled outpatient physical therapy to address the deficits listed in the problem list, provide pt/family education and to maximize pt's level of independence in the home and community environment.     Goals as follows:    Long Term Goals: 8 weeks     Pain: Maintain pain levels to no more than 2-3/10 to allow for improved ability to perform daily activities   Strength: Improve strength in core muscles to at least 3+/5 for improved lumbopelvic stability and to decrease risk of falls   ROM: Maintain functional ROM to [facility]   Functional scale: Improve score on LEFS  to <50% limitation   Walking: Increase walking distance and/or duration to 1000ft without pain - able to improve ft/sec time to at least 1.6 ft/sec  Postures: Increase sitting and/or standing duration to 15 mins without pain   Transfers: Perform all transfers without increased pain or limitation  Exercise: demonstrate independence with home exercise program to maintain gains made in therapy.       Plan     Continue with established Plan of Care towards PT goals.    Therapist: Jonathan Favre, PTA  3/10/2020

## 2020-03-12 ENCOUNTER — CLINICAL SUPPORT (OUTPATIENT)
Dept: REHABILITATION | Facility: HOSPITAL | Age: 85
End: 2020-03-12
Attending: INTERNAL MEDICINE
Payer: MEDICARE

## 2020-03-12 DIAGNOSIS — R26.81 GAIT INSTABILITY: Primary | ICD-10-CM

## 2020-03-12 PROCEDURE — 97110 THERAPEUTIC EXERCISES: CPT | Mod: PN,CQ

## 2020-03-12 NOTE — PROGRESS NOTES
Physical Therapy Daily Note     Name: Jeffrey Ocasio  [facility] Number: [id number]  Diagnosis:   Encounter Diagnosis   Name Primary?    Gait instability Yes     Physician: Cinthia Shrestha MD  Precautions: standard, fall   Visit #: 11 of 11  PTA Visit #: 5  Time In: 1:00 PM   Time Out:  2:05 PM     Subjective     Pt reports: No new c/o's.  Pain Scale: Jeffrey rates pain on a scale of 0-10 to be 4 currently. Lower back     Objective     Jeffrey received individual therapeutic exercises to develop strength, posture and core stabilization for 45 minutes including:  Nu-Step x 30 mins - Level 6  Static Standing x 2 mins on Air-Ex mat - tactile cueing at lumbar spine and left anterior shoulder   Toe-Taps x 2 mins - tactile cueing at lumbar spine and left anterior shoulder for posture   Bridging x 20  Ball Squeezes x 3 mins  DKC with SB x 3 mins  Lumbar Rolls with SB x 3 mins  H/S Stretching - BLE 5 x 15 sec hold  SLR x 10  S/L Lumbar rotation x 10  Clams x 15  S/L Hip Abd x 10    Jeffrey received the following manual therapy techniques:  were applied to the:  for  minutes including:       The patient received the following direct contact modalities after being cleared for contraindications:     The patient received the following supervised modalities after being cleared for contradictions:     Written Home Exercises Provided:   Encouraged riding recumbent bike at home daily x 15-20 mins   Pt demo good understanding of the education provided. Jeffrey demonstrated good return demonstration of activities.     Education provided re:  Jeffrey verbalized good understanding of education provided.   No spiritual or educational barriers to learning provided    Assessment     Patient tolerated treatment fairly well; fatigued today; requires frequent verbal/tactile cueing for posture with all standing activities, but is very compliant with all exercises and cueing.   This is a 87 y.o.  male referred to outpatient physical therapy and presents with a medical diagnosis of falls, lower back pain  and demonstrates limitations as described in the problem list. Pt prognosis is Good. Pt will continue to benefit from skilled outpatient physical therapy to address the deficits listed in the problem list, provide pt/family education and to maximize pt's level of independence in the home and community environment.     Goals as follows:    Long Term Goals: 8 weeks     Pain: Maintain pain levels to no more than 2-3/10 to allow for improved ability to perform daily activities   Strength: Improve strength in core muscles to at least 3+/5 for improved lumbopelvic stability and to decrease risk of falls   ROM: Maintain functional ROM to [facility]   Functional scale: Improve score on LEFS  to <50% limitation   Walking: Increase walking distance and/or duration to 1000ft without pain - able to improve ft/sec time to at least 1.6 ft/sec  Postures: Increase sitting and/or standing duration to 15 mins without pain   Transfers: Perform all transfers without increased pain or limitation  Exercise: demonstrate independence with home exercise program to maintain gains made in therapy.       Plan     Recommend D/C from skilled PT at this time.    Therapist: Jonathan Favre, PTA  3/12/2020

## 2020-04-30 ENCOUNTER — TELEPHONE (OUTPATIENT)
Dept: PODIATRY | Facility: CLINIC | Age: 85
End: 2020-04-30

## 2020-04-30 NOTE — TELEPHONE ENCOUNTER
----- Message from Suad Block sent at 4/30/2020 11:44 AM CDT -----  Contact: Patient  Type: Needs Medical Advice  Who Called:  Patient  Best Call Back Number: [phone]  Additional Information: pt is requesting to speak with Mr or Mrs Dr. Reed.

## 2020-05-04 ENCOUNTER — OFFICE VISIT (OUTPATIENT)
Dept: PODIATRY | Facility: CLINIC | Age: 85
End: 2020-05-04
Payer: MEDICARE

## 2020-05-04 VITALS
DIASTOLIC BLOOD PRESSURE: 96 MMHG | HEIGHT: 74 IN | BODY MASS INDEX: 27.46 KG/M2 | HEART RATE: 83 BPM | TEMPERATURE: 98 F | SYSTOLIC BLOOD PRESSURE: 148 MMHG | WEIGHT: 214 LBS

## 2020-05-04 DIAGNOSIS — L03.031 PARONYCHIA OF GREAT TOE, RIGHT: ICD-10-CM

## 2020-05-04 DIAGNOSIS — L60.0 INGROWN NAIL: Primary | ICD-10-CM

## 2020-05-04 DIAGNOSIS — G60.9 IDIOPATHIC PERIPHERAL NEUROPATHY: ICD-10-CM

## 2020-05-04 DIAGNOSIS — B35.3 TINEA PEDIS OF BOTH FEET: ICD-10-CM

## 2020-05-04 PROCEDURE — 99213 OFFICE O/P EST LOW 20 MIN: CPT | Mod: PBBFAC | Performed by: PODIATRIST

## 2020-05-04 PROCEDURE — 99213 PR OFFICE/OUTPT VISIT, EST, LEVL III, 20-29 MIN: ICD-10-PCS | Mod: S$PBB,,, | Performed by: PODIATRIST

## 2020-05-04 PROCEDURE — 99213 OFFICE O/P EST LOW 20 MIN: CPT | Mod: S$PBB,,, | Performed by: PODIATRIST

## 2020-05-04 PROCEDURE — 99999 PR PBB SHADOW E&M-EST. PATIENT-LVL III: ICD-10-PCS | Mod: PBBFAC,,, | Performed by: PODIATRIST

## 2020-05-04 PROCEDURE — 99999 PR PBB SHADOW E&M-EST. PATIENT-LVL III: CPT | Mod: PBBFAC,,, | Performed by: PODIATRIST

## 2020-05-04 RX ORDER — DOXYCYCLINE 100 MG/1
CAPSULE ORAL
COMMUNITY
Start: 2020-04-24 | End: 2020-11-09

## 2020-05-04 RX ORDER — FUROSEMIDE 20 MG/1
40 TABLET ORAL
COMMUNITY
Start: 2020-02-04 | End: 2021-01-28

## 2020-05-04 RX ORDER — RIVAROXABAN 20 MG/1
TABLET, FILM COATED ORAL
COMMUNITY
Start: 2020-03-16 | End: 2020-09-02 | Stop reason: ALTCHOICE

## 2020-05-04 NOTE — LETTER
May 7, 2020      Cinthia Shrestha MD  [address]  [place]  [facility] [place] [address]           [facility]  [address] [facility] [place] [address]  Phone: [phone]  Fax: [phone]          Patient: Jeffrey Ocasio   MR Number: [id number]   YOB: 1933   Date of Visit: 5/4/2020       Dear Dr. Cinthia Shrestha:    Thank you for referring Jeffrey Ocasio to me for evaluation. Attached you will find relevant portions of my assessment and plan of care.    If you have questions, please do not hesitate to call me. I look forward to following Jeffrey Ocasio along with you.    Sincerely,    David Reed, RG    Enclosure  CC:  No Recipients    If you would like to receive this communication electronically, please contact [email or web address] or [phone] to request more information on [employer] Link access.    For providers and/or their staff who would like to refer a patient to [facility], please contact us through our one-stop-shop provider referral line, [facility], at [phone].    If you feel you have received this communication in error or would no longer like to receive these types of communications, please e-mail [email or web address]

## 2020-05-07 NOTE — PROGRESS NOTES
Subjective:       Patient ID: Jeffrey Ocasio is a 87 y.o. male.    Chief Complaint: Follow-up and Nail Problem  Patient presents today he is concerned about ingrowing toenails on his big toes.  Patient is concerned over trauma to his right great toenail he has had chronic problems with an ingrown toenail in this area he recently caught the nail on his socks and he states it lifted almost completely off and was bleeding.  Patient is having sciatic nerve problems which states it is very difficult for him to get down to evaluate his right.  Follow-up   Associated symptoms include arthralgias and numbness.   Nail Problem   Associated symptoms include arthralgias and numbness.   Ingrown Toenail   Associated symptoms include arthralgias and numbness.     Review of Systems   Musculoskeletal: Positive for arthralgias, back pain and gait problem.   Neurological: Positive for numbness.   All other systems reviewed and are negative.      Objective:      Physical Exam   Constitutional: He appears well-developed and well-nourished.   Cardiovascular:   Pulses:       Dorsalis pedis pulses are 1+ on the right side, and 1+ on the left side.        Posterior tibial pulses are 1+ on the right side, and 1+ on the left side.   Pulmonary/Chest: Effort normal.   Musculoskeletal: He exhibits edema and deformity.        Right foot: There is deformity.        Left foot: There is deformity.   Feet:   Right Foot:   Protective Sensation: 4 sites tested. 1 site sensed.  Left Foot:   Protective Sensation: 4 sites tested. 1 site sensed.  Neurological: He displays abnormal reflex.   Skin: Skin is warm. Capillary refill takes more than 3 seconds. There is erythema.   Psychiatric: He has a normal mood and affect. His behavior is normal. Judgment and thought content normal.   Nursing note and vitals reviewed.    On evaluation patient has areas of previously noted ingrown infected toenail on both big toes there is signs of fungal involvement some of  the nail is growing into both the medial and lateral border which raises concern for infection there is some mild discomfort noted upon palpation. Patient does have history of currently displayed signs of interdigital maceration with signs of interdigital fungal involvement. Patient displays findings consistent with previously noted neuropathy bilateral.                Assessment:       1. Ingrown nail    2. Tinea pedis of both feet    3. Idiopathic peripheral neuropathy    4. Paronychia of great toe, right        Plan:       Patient presents today stating he has a very painful right great toe patient has a chronic history of infected ingrown toenails on both big toes the inside of his right great toe has been bothering him for about a week.  Patient had traumatized his right great toenail when putting his socks on a caught the nail lifting the nail up causing it to bleed he is concerned about infection in the area but certainly would did not want to try to remove the nail himself he does have significant swelling in both lower extremities I did discuss with the patient making sure he monitors his salt intake.  The entire right hallux nail was able to be removed the patient did not need to be locally anesthetized to do this the nail was almost completely detached the area was thoroughly cleaned I did give the patient instructions to soak his right foot for the next 10 days in warm water with Epsom salt to help the area continue to heal patient was made aware this should be resolved within the next 7-14 days it is going to take probably 6-8 months for the nail to grow back completely this area needs to be monitored closely any increased redness swelling pain discomfort he is to contact us immediately I also reminded the patient to make sure he dries between his toes well as he does have an extensive history of interdigital fungal involvement.  Follow-up in 4-5 days if the patient is not doing significantly better or  if the right great toenails not looking better.  Additional ingrowing toenails were debrided today to prevent complications in the future.  Total face-to-face time equaled 20 min.    Patient continues to suffer from sciatic and back problems.  This note was created using [employer] voice recognition software that occasionally misinterpreted phrases or words.

## 2020-06-23 ENCOUNTER — CLINICAL SUPPORT (OUTPATIENT)
Dept: REHABILITATION | Facility: HOSPITAL | Age: 85
End: 2020-06-23
Payer: MEDICARE

## 2020-06-23 DIAGNOSIS — M54.16 CHRONIC RADICULAR PAIN OF LOWER BACK: Primary | ICD-10-CM

## 2020-06-23 DIAGNOSIS — G89.29 CHRONIC RADICULAR PAIN OF LOWER BACK: Primary | ICD-10-CM

## 2020-06-23 DIAGNOSIS — G89.29 CHRONIC RIGHT SHOULDER PAIN: ICD-10-CM

## 2020-06-23 DIAGNOSIS — R26.81 GAIT INSTABILITY: ICD-10-CM

## 2020-06-23 DIAGNOSIS — M25.511 CHRONIC RIGHT SHOULDER PAIN: ICD-10-CM

## 2020-06-23 PROCEDURE — 97162 PT EVAL MOD COMPLEX 30 MIN: CPT | Mod: PN

## 2020-06-23 NOTE — PLAN OF CARE
[facility]  Physical Therapy Initial Evaluation    Name: Jeffrey Ocasio  Clinic Number: [id number]    Therapy Diagnosis:   Encounter Diagnoses   Name Primary?    Chronic radicular pain of lower back Yes    Gait instability     Chronic right shoulder pain      Physician: Cinthia Shrestha MD    Physician Orders: PT Eval and Treat   Medical Diagnosis: Lumbar Stenosis with LLE ; also has Right shoulder chronic pain  Evaluation Date: 6/23/2020  Authorization period Expiration: 12/31/2020  Plan of Care Certification Period: 9/23/2020     Visit #: 1/ Visits authorized: 18  Time In:11:00 AM   Time Out: 12:00 pm   Total Billable Time: 60 minutes    Precautions: Standard and Fall    Subjective   Date of onset: April of this year with exacerbation in first week of June   Date of Surgery: n/a    Past Medical History:   Diagnosis Date    Allergy     Asthma    Esophageal stricture; Benign essential tremor; Peripheral edema; Peripheral Neuropathy; Cervical and lumbar spinal stenosis; Chronic right shoulder pain;     Jeffrey Ocasio  has a past surgical history that includes Knee surgery. Jason Murphy has a current medication list which includes the following prescription(s): aspirin, biotin, cholecalciferol (vitamin d3), cyclobenzaprine, doxycycline, escitalopram oxalate, finasteride, fish oil-omega-3 fatty acids, furosemide, gabapentin, glucosamine-chondroitin, klor-con m20, [id number]-algal oil, meclizine, melatonin, methocarbamol, multivitamin, naproxen, nitroglycerin, omeprazole, primidone, rosuvastatin, synthroid, tamsulosin, and xarelto.       Imaging, MRI studies: not recent - 2019 Lumbar spine - multilevel degenerative disc disease with stenotic changes.     Prior Therapy: Jeffrey is well known in this clinic for current condition - recent treatment for lower back pain last year (2019)   Social History: Patient lives in a single level home with 2 steps to enter, lives with  "spouse;    Occupation: retired    Prior Level of Function: ambulatory with use of straight cane, has RW at home if he needs more support; driving; manages all of his own affairs   Current Level of Function: currently using rollator at home, regular RW in community because rollator is too cumbersome to get in/out of car; Jeffrey is no longer driving (memory problems as well as decreased overall mobility) He is able to dress himself, bath (walk in shower) manages his own ADL's.       Pain:  Current 8/10, worst 10/10, best 5/10   - back is 8/10; Right shoulder is 5/10   Location: back  and shoulder  right and lower back with LLE pain   Description: Aching and Grabbing  Aggravating Factors:  "It just hurts"  Has to use Rollator for ambulation in   Easing Factors: ice and heating pad      Onset/MAGO: insidious    History of current condition - JEFFREY has chronic neck, back and right shoulder pain - present for at least past 10 years; He has been to therapy on several occasions for all of the above with good benefit on each occasion.  Unfortunately, pain relief is only temporary as Jeffrey has become very sedentary at home and does not follow up with recommended exercises at home.  This episode started back in April and became worse as the months went on.  He went to see Dr. Coleman about 3-4 weeks ago for an injection in his shoulder - did not result in any improvement.  Jeffrey told me that Funmi (wife) has been using heat/cold on his lower back and that this seems to give him some relief.  He reports pain in lumbar spine with radicular symptoms into LLE to knee.  His right shoulder is painful most of the time - he has in impingement and probable RTC tear/tendonitis - postural issues as well that lead to his continued pain.  Jeffrey is now using a rollator at home for ambulation to take pressure off of his spine and LLE and using a regular RW out in the community.  They have an inversion table at home - Jeffrey " "stated that he is able to get himself into it, but hasn't used it in quite a while.  Wondered about using this again. Jeffrey also demonstrates some memory deficits - this is not new; demonstrates word-finding difficulty and short term memory deficits.     Pts goals: To get some pain relief.         Objective   Posture:  slouched posture, forward head, kyphosis; right shoulder very protracted and depressed; increased trunk flexion with standing/walking; stands/sits in posterior pelvic tilt.     Gait: using RW, forward flexed trunk; decreased clearance of right foot; flexed hips/knees; antalgic gait pattern.   Able to ambulate for 4'21" and cover 400ft - limiting factor is lower back and left leg pain.   Very difficult to get up a step, but is able with use of railing.       Range of Motion:   Cervical - loss of extension noted; rotation to 60 degrees  Thoracic:  Loss of extension; Rotation to 25 degrees on right/left  Lumbar: Loss of flexion and extension, but functional; difficulty getting into a neutral spine posture in sitting; cannot obtain neutral in standing.   UE: WFL - painful into IR and abudction   LE: WFL     Upper Extremity Strength - mm atrophy around Right shoulder noted   (R) UE  (L) UE    Shoulder flexion: 4-/5 Shoulder flexion: 4/5   Shoulder Abduction: 3-/5 Shoulder abduction: 4/5   Shoulder ER 3-/5 Shoulder ER 4/5   Shoulder IR 4/5 Shoulder IR 4+/5   Elbow Flexion 5/5 Elbow Flexion 5/5   Elbow Extension 4-/5 Elbow Extension 4-/5   Wrist Flexion 4/5 Wrist Flexion 4/5   Wrist Extension 4/5 Wrist Extension 4/5      equal;     Lower Extremity Strength- significant mm atrophy in gluteal mm secondary to flexed posture with ambulation/chronic LBP  Right LE  Left LE    Knee extension: 4/5 Knee extension: 4/5   Knee flexion: 4/5 Knee flexion: 4/5   Hip flexion: 4-/5 Hip flexion: 4-/5   Hip extension:  3/5 Hip extension: 3/5   Hip abduction: 3/5 Hip abduction: 3/5   Hip adduction: 3+/5 Hip adduction " 3+/5   Ankle dorsiflexion: 3-/5 Ankle dorsiflexion: 3-/5   Ankle plantarflexion: 3-/5 Ankle plantarflexion: 3-/5       Palpation: moderate tenderness to palpation at Left paraspinals, left quadratus; Right upper trap; Right posterior deltoid, anterior deltoid and long head of biceps     Sensation: decreased sensation Bilateral lower legs and feet secondary to neuropathy        PT Evaluation Completed? Yes  Discussed Plan of Care with patient: Yes    Home Exercises and Patient Education Provided    Education provided re:   - progress towards goals   - role of therapy in multi - disciplinary team, goals for therapy  Pt educated on condition, POC, and expectations in therapy.  No spiritual or educational barriers to learning provided    Home exercises:  Pt will be provided HEP during course of treatment with progressions as appropriate. Pt was advised to perform these exercises free of pain, and to stop performing them if pain occurs.   JEFFREY demonstrated good  understanding of the education provided.       Functional Limitations Reports - G Codes  Category: Mobility, Body position, Carrying, Self care, Other  Tool: 6 min walk test ; Oswestry   Score: 60% impairment       Assessment   Jeffrey is a 87 y.o. male referred to outpatient physical therapy and presents to PT with Acute on Chronic Lumbar stenosis with LLE radicular pain as well as chronic Right shoulder pain. Jeffrey is well known to clinic; He was just here back in February/March for treatment of back pain and LE weakness and gait instability. He has had a more severe reoccurrence of his back pain, and right shoulder is bothering him again as well- all consequences of his poor posture and general mm weakness/sedentary lifestyle. Jeffrey usually responds well to some exercise and stretching - unfortunately he is just unable to maintain his progress at home for very long.  Patient demonstrates limitations as described in the problem list. Pt will benefit from  physcial therapy services in order to maximize pain free and/or functional use of right UE and improve his ability to ambulate without pain. The following goals were discussed with the patient and patient is in agreement with them as to be addressed in the treatment plan.     Pt prognosis is Fair.   Pt will benefit from skilled outpatient Physical Therapy to address the deficits stated above and in the chart below, provide pt/family education, and to maximize pt's level of independence.     Plan of care discussed with patient: Yes  Pt's spiritual, cultural and educational needs considered and pt agreeable to plan of care and goals as stated below:     Anticipated Barriers for therapy: none    Medical necessity is demonstrated by the following IMPAIRMENTS/PROBLEM LIST:    weakness, impaired endurance, impaired sensation, impaired functional mobility, gait instability, impaired balance, decreased lower extremity function, pain and decreased ROM    GOALS:     Long Term Goals: 8 weeks  Pain: Decrease pain to no more than 4/10 to allow for improved ability to perform daily activities   Functional scale: Improve score on Oswestry to <45% impairment   Walking: Increase walking distance/duration to 6 mins without pain  Postures: Increase sitting and/or standing duration to 15 mins  without pain   Transfers: Perform sit <> Stand  transfers without increased pain or limitation  Exercise: demonstrate independence with home exercise program to maintain gains made in therapy.          Plan   Certification Period: 6/23/2020 to 9/23/2020 .    Outpatient physical therapy 2 times weekly to include: Gait Training, Manual Therapy, Moist Heat/ Ice, Neuromuscular Re-ed, Patient Education and Therapeutic Exercise. Cont PT for 6-8 weeks.   Pt may be seen by PTA as part of the rehabilitation team.     I certify the need for these services furnished under this plan of treatment and while under my care.    Dena Bauman, PT        Attestation:    I have seen the patient, reviewed the therapist's plan of care, and I agree with the plan of care.   I certify the need for these services furnished under this plan of treatment and while under my care.         _______________            ________                                               _____________________  Physician/Referring Practitioner                                                            Date of Signature

## 2020-06-25 ENCOUNTER — CLINICAL SUPPORT (OUTPATIENT)
Dept: REHABILITATION | Facility: HOSPITAL | Age: 85
End: 2020-06-25
Payer: MEDICARE

## 2020-06-25 DIAGNOSIS — G89.29 CHRONIC RADICULAR PAIN OF LOWER BACK: Primary | ICD-10-CM

## 2020-06-25 DIAGNOSIS — M54.16 CHRONIC RADICULAR PAIN OF LOWER BACK: Primary | ICD-10-CM

## 2020-06-25 PROCEDURE — 97110 THERAPEUTIC EXERCISES: CPT | Mod: PN,CQ

## 2020-06-25 NOTE — PROGRESS NOTES
Physical Therapy Daily Note     Name: Jeffrey Ocasio  [facility] Number: [id number]  Diagnosis:   Encounter Diagnosis   Name Primary?    Chronic radicular pain of lower back Yes     Physician: Cinthia Shrestha MD  Precautions: Standard  Visit #: 2 of 12  PTA Visit #: 1  Time In: 2:00 PM  Time Out: 3:00 PM     Subjective     Pt reports:  I am hurting today; it hurts to sit down.   Pain Scale: Jeffrey rates pain on a scale of 0-10 to be 8 currently.    Objective     Jeffrey received individual therapeutic exercises to develop strength, endurance, flexibility, posture and core stabilization for 45 minutes including:  Nustep level 3-4 x 20 mins  Pelvic Tilts x 15   Bridges x 15  Ball Squeezes x 3 mins  Bent Knee Fallouts x 15  DKC x 3 mins with SB  Lumbar Rolls x 3 mins with SB      Jeffrey received the following manual therapy techniques:  were applied to the:  for  minutes including:       The patient received the following direct contact modalities after being cleared for contraindications:     The patient received the following supervised modalities after being cleared for contradictions:     Written Home Exercises Provided:   Pt demo good understanding of the education provided. Jeffrey demonstrated good return demonstration of activities.     Education provided re:  Jeffrey verbalized good understanding of education provided.   No spiritual or educational barriers to learning provided    Assessment     Patient tolerated exercises fairly well; will continue to progress as patient tolerates to improve strength and mobility and decrease pain.   This is a 87 y.o. male referred to outpatient physical therapy and presents with a medical diagnosis of Chronic Lumbar stenosis with LLE radicular pain as well as chronic Right shoulder pain and demonstrates limitations as described in the problem list. Pt prognosis is Good. Pt will continue to benefit from skilled outpatient  physical therapy to address the deficits listed in the problem list, provide pt/family education and to maximize pt's level of independence in the home and community environment.     LONG TERM GOALS:  Long Term Goals: 8 weeks  Pain: Decrease pain to no more than 4/10 to allow for improved ability to perform daily activities   Functional scale: Improve score on Oswestry to <45% impairment   Walking: Increase walking distance/duration to 6 mins without pain  Postures: Increase sitting and/or standing duration to 15 mins  without pain   Transfers: Perform sit <> Stand  transfers without increased pain or limitation  Exercise: demonstrate independence with home exercise program to maintain gains made in therapy.       Plan     Continue with established Plan of Care towards PT goals.    Therapist: Jonathan Favre, PTA  6/25/2020

## 2020-06-25 NOTE — PLAN OF CARE
PT met face to face with Jonathan Favre, PTA to discuss patient's treatment plan and progress towards established goals.  Treatment will be continued as described in initial report/eval and progress notes.  Patient will be seen by physical therapist every sixth visit and minimally once per month.    Additional information: back and right shoulder pain

## 2020-06-30 ENCOUNTER — CLINICAL SUPPORT (OUTPATIENT)
Dept: REHABILITATION | Facility: HOSPITAL | Age: 85
End: 2020-06-30
Payer: MEDICARE

## 2020-06-30 DIAGNOSIS — G89.29 CHRONIC RADICULAR PAIN OF LOWER BACK: Primary | ICD-10-CM

## 2020-06-30 DIAGNOSIS — M54.16 CHRONIC RADICULAR PAIN OF LOWER BACK: Primary | ICD-10-CM

## 2020-06-30 PROCEDURE — 97110 THERAPEUTIC EXERCISES: CPT | Mod: PN,CQ

## 2020-06-30 NOTE — PROGRESS NOTES
Physical Therapy Daily Note     Name: Jeffrey Ocasio  [facility] Number: [id number]  Diagnosis:   Encounter Diagnosis   Name Primary?    Chronic radicular pain of lower back Yes     Physician: Cinthia Shrestha MD  Precautions: Standard  Visit #: 3 of 12  PTA Visit #: 2  Time In: 1:00 PM  Time Out: 2:00 PM     Subjective     Pt reports:  No new c/o's.    Pain Scale: Jeffrey rates pain on a scale of 0-10 to be 8 currently.    Objective     Jeffrey received individual therapeutic exercises to develop strength, endurance, flexibility, posture and core stabilization for 45 minutes including:  Nustep level 5 x 30 mins  Pelvic Tilts x 20  Bridges x 15  Ball Squeezes x 3 mins  Bent Knee Fallouts x 15   DKC x 3 mins with SB  Lumbar Rolls x 3 mins with SB      Jeffrey received the following manual therapy techniques:  were applied to the:  for  minutes including:       The patient received the following direct contact modalities after being cleared for contraindications:     The patient received the following supervised modalities after being cleared for contradictions:     Written Home Exercises Provided:   Pt demo good understanding of the education provided. Jeffrey demonstrated good return demonstration of activities.     Education provided re:  Jeffrey verbalized good understanding of education provided.   No spiritual or educational barriers to learning provided    Assessment     Patient tolerated exercises fairly well; will continue to progress as patient tolerates to improve strength and mobility and decrease pain.   This is a 87 y.o. male referred to outpatient physical therapy and presents with a medical diagnosis of Chronic Lumbar stenosis with LLE radicular pain as well as chronic Right shoulder pain and demonstrates limitations as described in the problem list. Pt prognosis is Good. Pt will continue to benefit from skilled outpatient physical therapy to address the  deficits listed in the problem list, provide pt/family education and to maximize pt's level of independence in the home and community environment.     LONG TERM GOALS:  Long Term Goals: 8 weeks  Pain: Decrease pain to no more than 4/10 to allow for improved ability to perform daily activities   Functional scale: Improve score on Oswestry to <45% impairment   Walking: Increase walking distance/duration to 6 mins without pain  Postures: Increase sitting and/or standing duration to 15 mins  without pain   Transfers: Perform sit <> Stand  transfers without increased pain or limitation  Exercise: demonstrate independence with home exercise program to maintain gains made in therapy.       Plan     Continue with established Plan of Care towards PT goals.    Therapist: Jonathan Favre, PTA  6/30/2020

## 2020-07-02 ENCOUNTER — CLINICAL SUPPORT (OUTPATIENT)
Dept: REHABILITATION | Facility: HOSPITAL | Age: 85
End: 2020-07-02
Payer: MEDICARE

## 2020-07-02 DIAGNOSIS — G89.29 CHRONIC RADICULAR PAIN OF LOWER BACK: Primary | ICD-10-CM

## 2020-07-02 DIAGNOSIS — M62.81 MUSCLE WEAKNESS (GENERALIZED): ICD-10-CM

## 2020-07-02 DIAGNOSIS — M54.16 CHRONIC RADICULAR PAIN OF LOWER BACK: Primary | ICD-10-CM

## 2020-07-02 PROCEDURE — 97110 THERAPEUTIC EXERCISES: CPT | Mod: PN,CQ

## 2020-07-02 NOTE — PROGRESS NOTES
Physical Therapy Daily Note     Name: Jeffrey Ocasio  [facility] Number: [id number]  Diagnosis:   Encounter Diagnoses   Name Primary?    Chronic radicular pain of lower back Yes    Muscle weakness (generalized)      Physician: Cinthia Shrestha MD  Precautions: Standard  Visit #: 4 of 12  PTA Visit #: 3  Time In: 1:15 PM  Time Out: 2:15 PM     Subjective     Pt reports:  No new c/o's.    Pain Scale: Jeffrey rates pain on a scale of 0-10 to be 8 currently.    Objective     Jeffrey received individual therapeutic exercises to develop strength, endurance, flexibility, posture and core stabilization for 45 minutes including:  Nustep level 5 x 30 mins  Pelvic Tilts x 20  Bridges x 15  Ball Squeezes x 3 mins  Bent Knee Fallouts x 15   DKC x 3 mins with SB  Lumbar Rolls x 3 mins with SB  Seated Lumbar Flexion with SB x 4 mins      Jeffrey received the following manual therapy techniques:  were applied to the:  for  minutes including:       The patient received the following direct contact modalities after being cleared for contraindications:     The patient received the following supervised modalities after being cleared for contradictions:     Written Home Exercises Provided:   Pt demo good understanding of the education provided. Jeffrey demonstrated good return demonstration of activities.     Education provided re:  Jeffrey verbalized good understanding of education provided.   No spiritual or educational barriers to learning provided    Assessment     Patient tolerated exercises fairly well; will continue to progress as patient tolerates to improve strength and mobility and decrease pain.   This is a 87 y.o. male referred to outpatient physical therapy and presents with a medical diagnosis of Chronic Lumbar stenosis with LLE radicular pain as well as chronic Right shoulder pain and demonstrates limitations as described in the problem list. Pt prognosis is Good. Pt will  continue to benefit from skilled outpatient physical therapy to address the deficits listed in the problem list, provide pt/family education and to maximize pt's level of independence in the home and community environment.     LONG TERM GOALS:  Long Term Goals: 8 weeks  Pain: Decrease pain to no more than 4/10 to allow for improved ability to perform daily activities   Functional scale: Improve score on Oswestry to <45% impairment   Walking: Increase walking distance/duration to 6 mins without pain  Postures: Increase sitting and/or standing duration to 15 mins  without pain   Transfers: Perform sit <> Stand  transfers without increased pain or limitation  Exercise: demonstrate independence with home exercise program to maintain gains made in therapy.       Plan     Continue with established Plan of Care towards PT goals.    Therapist: Jonathan Favre, PTA  7/2/2020

## 2020-07-07 ENCOUNTER — CLINICAL SUPPORT (OUTPATIENT)
Dept: REHABILITATION | Facility: HOSPITAL | Age: 85
End: 2020-07-07
Payer: MEDICARE

## 2020-07-07 DIAGNOSIS — M62.81 MUSCLE WEAKNESS (GENERALIZED): ICD-10-CM

## 2020-07-07 DIAGNOSIS — G89.29 CHRONIC RADICULAR PAIN OF LOWER BACK: Primary | ICD-10-CM

## 2020-07-07 DIAGNOSIS — M54.16 CHRONIC RADICULAR PAIN OF LOWER BACK: Primary | ICD-10-CM

## 2020-07-07 PROCEDURE — 97110 THERAPEUTIC EXERCISES: CPT | Mod: PN,CQ

## 2020-07-07 PROCEDURE — 97010 HOT OR COLD PACKS THERAPY: CPT | Mod: PN,CQ

## 2020-07-07 NOTE — PROGRESS NOTES
Physical Therapy Daily Note     Name: Jeffrey Ocasio  [facility] Number: [id number]  Diagnosis:   Encounter Diagnoses   Name Primary?    Chronic radicular pain of lower back Yes    Muscle weakness (generalized)      Physician: Cinthia Shrestha MD  Precautions: Standard  Visit #: 5 of 12  PTA Visit #: 4  Time In: 1:00 PM  Time Out: 2:30 PM     Subjective     Pt reports:  My hips are hurting.   Pain Scale: Jeffrey rates pain on a scale of 0-10 to be 10 currently.    Objective     Jeffrey received individual therapeutic exercises to develop strength, endurance, flexibility, posture and core stabilization for 45 minutes including:  Nustep level 5 x 30 mins  Pelvic Tilts x 20  Bridges x 15  Ball Squeezes x 3 mins  Bent Knee Fallouts x 15   DKC x 3 mins with SB  Lumbar Rolls x 3 mins with SB  Seated Lumbar Flexion with SB x 4 mins      Jeffrey received the following manual therapy techniques:  were applied to the:  for  minutes including:       The patient received the following direct contact modalities after being cleared for contraindications:     The patient received the following supervised modalities after being cleared for contradictions: MHP x 15 minutes to low back following exercises.    Written Home Exercises Provided:   Pt demo good understanding of the education provided. Jeffrey demonstrated good return demonstration of activities.     Education provided re:  Jeffrey verbalized good understanding of education provided.   No spiritual or educational barriers to learning provided    Assessment     Patient tolerated exercises fairly well; will continue to progress as patient tolerates to improve strength and mobility and decrease pain.   This is a 87 y.o. male referred to outpatient physical therapy and presents with a medical diagnosis of Chronic Lumbar stenosis with LLE radicular pain as well as chronic Right shoulder pain and demonstrates limitations as described  in the problem list. Pt prognosis is Good. Pt will continue to benefit from skilled outpatient physical therapy to address the deficits listed in the problem list, provide pt/family education and to maximize pt's level of independence in the home and community environment.     LONG TERM GOALS:  Long Term Goals: 8 weeks  Pain: Decrease pain to no more than 4/10 to allow for improved ability to perform daily activities   Functional scale: Improve score on Oswestry to <45% impairment   Walking: Increase walking distance/duration to 6 mins without pain  Postures: Increase sitting and/or standing duration to 15 mins  without pain   Transfers: Perform sit <> Stand  transfers without increased pain or limitation  Exercise: demonstrate independence with home exercise program to maintain gains made in therapy.       Plan     Continue with established Plan of Care towards PT goals.    Therapist: Jonathan Favre, PTA  7/7/2020

## 2020-07-09 ENCOUNTER — CLINICAL SUPPORT (OUTPATIENT)
Dept: REHABILITATION | Facility: HOSPITAL | Age: 85
End: 2020-07-09
Payer: MEDICARE

## 2020-07-09 DIAGNOSIS — M54.16 CHRONIC RADICULAR PAIN OF LOWER BACK: Primary | ICD-10-CM

## 2020-07-09 DIAGNOSIS — G89.29 CHRONIC RADICULAR PAIN OF LOWER BACK: Primary | ICD-10-CM

## 2020-07-09 PROCEDURE — 97110 THERAPEUTIC EXERCISES: CPT | Mod: PN,CQ

## 2020-07-09 PROCEDURE — 97010 HOT OR COLD PACKS THERAPY: CPT | Mod: PN,CQ

## 2020-07-09 NOTE — PROGRESS NOTES
Physical Therapy Daily Note     Name: Jeffrey Ocasio  [facility] Number: [id number]  Diagnosis:   Encounter Diagnosis   Name Primary?    Chronic radicular pain of lower back Yes     Physician: Cinthia Shrestha MD  Precautions: Standard  Visit #: 6 of 12  PTA Visit #: 5  Time In: 1:05 PM  Time Out: 2:05 PM     Subjective     Pt reports:  No new c/o's.  Pain Scale: Jeffrey rates pain on a scale of 0-10 to be 7 currently.    Objective     Jeffrey received individual therapeutic exercises to develop strength, endurance, flexibility, posture and core stabilization for 45 minutes including:  Nustep level 5 x 30 mins  Pelvic Tilts x 20  Bridges x 15  Ball Squeezes x 3 mins  Bent Knee Fallouts x 15   DKC x 3 mins with SB  Lumbar Rolls x 3 mins with SB  Seated Lumbar Flexion with SB x 4 mins      Jeffrey received the following manual therapy techniques:  were applied to the:  for  minutes including:       The patient received the following direct contact modalities after being cleared for contraindications:     The patient received the following supervised modalities after being cleared for contradictions: MHP x 15 minutes to low back following exercises.    Written Home Exercises Provided:   Pt demo good understanding of the education provided. Jeffrey demonstrated good return demonstration of activities.     Education provided re:  Jeffrey verbalized good understanding of education provided.   No spiritual or educational barriers to learning provided    Assessment     Patient tolerated exercises fairly well; will continue to progress as patient tolerates to improve strength and mobility and decrease pain.   This is a 87 y.o. male referred to outpatient physical therapy and presents with a medical diagnosis of Chronic Lumbar stenosis with LLE radicular pain as well as chronic Right shoulder pain and demonstrates limitations as described in the problem list. Pt prognosis is Good.  Pt will continue to benefit from skilled outpatient physical therapy to address the deficits listed in the problem list, provide pt/family education and to maximize pt's level of independence in the home and community environment.     LONG TERM GOALS:  Long Term Goals: 8 weeks  Pain: Decrease pain to no more than 4/10 to allow for improved ability to perform daily activities   Functional scale: Improve score on Oswestry to <45% impairment   Walking: Increase walking distance/duration to 6 mins without pain  Postures: Increase sitting and/or standing duration to 15 mins  without pain   Transfers: Perform sit <> Stand  transfers without increased pain or limitation  Exercise: demonstrate independence with home exercise program to maintain gains made in therapy.       Plan     Continue with established Plan of Care towards PT goals.    Therapist: Jonathan Favre, PTA  7/9/2020

## 2020-07-14 ENCOUNTER — CLINICAL SUPPORT (OUTPATIENT)
Dept: REHABILITATION | Facility: HOSPITAL | Age: 85
End: 2020-07-14
Payer: MEDICARE

## 2020-07-14 DIAGNOSIS — G89.29 CHRONIC RADICULAR PAIN OF LOWER BACK: Primary | ICD-10-CM

## 2020-07-14 DIAGNOSIS — R26.81 GAIT INSTABILITY: ICD-10-CM

## 2020-07-14 DIAGNOSIS — M62.81 MUSCLE WEAKNESS (GENERALIZED): ICD-10-CM

## 2020-07-14 DIAGNOSIS — M54.16 CHRONIC RADICULAR PAIN OF LOWER BACK: Primary | ICD-10-CM

## 2020-07-14 PROCEDURE — 97110 THERAPEUTIC EXERCISES: CPT | Mod: PN

## 2020-07-14 PROCEDURE — 97140 MANUAL THERAPY 1/> REGIONS: CPT | Mod: PN

## 2020-07-14 NOTE — PROGRESS NOTES
Physical Therapy Daily Note     [id number]  Diagnosis:   Encounter Diagnoses   Name Primary?    Muscle weakness (generalized)     Chronic radicular pain of lower back Yes    Gait instability      Physician: Cinthia Shrestha MD  Precautions: Standard  Visit #: 6 of 12  PTA Visit #: 5  Time In: 1:05 PM  Time Out: 2:05 PM     Subjective     Pt reports:  No new c/o's. Pain varies between right and left buttock; continues to ambulate with RW - very antalgic gait pattern with forward trunk flexion - increased reliance on UE = support on walker; decreased foot clearance noted.   Pain Scale: Jeffrey rates pain on a scale of 0-10 to be 7 currently.    Objective     Jeffrey received individual therapeutic exercises to develop strength, endurance, flexibility, posture and core stabilization for 45 minutes including:  Nustep level 5 x 30 mins  Pelvic Tilts x 20  Bridges x 15  Ball Squeezes x 3 mins  Bent Knee Fallouts x 15   DKC x 3 mins with SB  Lumbar Rolls x 3 mins with SB  Seated Lumbar Flexion with SB x 4 mins      Jeffrey received the following manual therapy techniques:  were applied to the: Lumbar spine for 12  minutes including: S/L flexion to right/left lumbar spine - all levels; dynamic hamstring stretch BLE's;; lumbar/hip stretch into rotation on each side.        The patient received the following direct contact modalities after being cleared for contraindications:     The patient received the following supervised modalities after being cleared for contradictions:    Written Home Exercises Provided:   Pt demo good understanding of the education provided. Jeffrey demonstrated good return demonstration of activities.     Education provided re:  Jeffrey verbalized good understanding of education provided.   No spiritual or educational barriers to learning provided    Assessment     Patient tolerated exercises fairly well; using  inversion table at home on more frequent basis; sits too much, LE's are weak, putting a lot of stress on walker with UE's during gait; will continue to progress as patient tolerates to improve strength and mobility and decrease pain.   This is a 87 y.o. male referred to outpatient physical therapy and presents with a medical diagnosis of Chronic Lumbar stenosis with LLE radicular pain as well as chronic Right shoulder pain and demonstrates limitations as described in the problem list. Pt prognosis is Good. Pt will continue to benefit from skilled outpatient physical therapy to address the deficits listed in the problem list, provide pt/family education and to maximize pt's level of independence in the home and community environment.     LONG TERM GOALS:  Long Term Goals: 8 weeks  Pain: Decrease pain to no more than 4/10 to allow for improved ability to perform daily activities   Functional scale: Improve score on Oswestry to <45% impairment   Walking: Increase walking distance/duration to 6 mins without pain  Postures: Increase sitting and/or standing duration to 15 mins  without pain   Transfers: Perform sit <> Stand  transfers without increased pain or limitation  Exercise: demonstrate independence with home exercise program to maintain gains made in therapy.       Plan     Continue with established Plan of Care towards PT goals.    Therapist: Dena Bauman, PT  7/14/2020

## 2020-07-16 ENCOUNTER — CLINICAL SUPPORT (OUTPATIENT)
Dept: REHABILITATION | Facility: HOSPITAL | Age: 85
End: 2020-07-16
Payer: MEDICARE

## 2020-07-16 DIAGNOSIS — M54.16 CHRONIC RADICULAR PAIN OF LOWER BACK: Primary | ICD-10-CM

## 2020-07-16 DIAGNOSIS — G89.29 CHRONIC RADICULAR PAIN OF LOWER BACK: Primary | ICD-10-CM

## 2020-07-16 PROCEDURE — 97110 THERAPEUTIC EXERCISES: CPT | Mod: PN,CQ

## 2020-07-16 NOTE — PROGRESS NOTES
Physical Therapy Daily Note     Name: Jeffrey Ocasio  [facility] Number: [id number]  Diagnosis:   Encounter Diagnosis   Name Primary?    Chronic radicular pain of lower back Yes     Physician: Cinthia Shrestha MD  Precautions: Standard  Visit #: 7 of 12  PTA Visit #: 1  Time In: 1:15 PM  Time Out: 2:25 PM     Subjective     Pt reports:  No new c/o's.   Pain Scale: Jeffrey rates pain on a scale of 0-10 to be 3 currently.    Objective     Jeffrey received individual therapeutic exercises to develop strength, endurance, flexibility, posture and core stabilization for 45 minutes including:  Nustep level 5 x 30 mins  Pelvic Tilts x 20  Bridges x 15  Ball Squeezes x 3 mins  Bent Knee Fallouts x 15   DKC x 3 mins with SB  Lumbar Rolls x 3 mins with SB  Seated Lumbar Flexion with SB x 4 mins    DNP  Jeffrey received the following manual therapy techniques:  were applied to the: Lumbar spine for 12  minutes including: S/L flexion to right/left lumbar spine - all levels; dynamic hamstring stretch BLE's;; lumbar/hip stretch into rotation on each side.        The patient received the following direct contact modalities after being cleared for contraindications:     The patient received the following supervised modalities after being cleared for contradictions:    Written Home Exercises Provided:   Pt demo good understanding of the education provided. Jeffrey demonstrated good return demonstration of activities.     Education provided re:  Jefrfey verbalized good understanding of education provided.   No spiritual or educational barriers to learning provided    Assessment     Patient tolerated exercises fairly well; using inversion table at home on more frequent basis; sits too much, LE's are weak, putting a lot of stress on walker with UE's during gait; will continue to progress as patient tolerates to improve strength and mobility and decrease pain.   This is a 87 y.o. male referred  to outpatient physical therapy and presents with a medical diagnosis of Chronic Lumbar stenosis with LLE radicular pain as well as chronic Right shoulder pain and demonstrates limitations as described in the problem list. Pt prognosis is Good. Pt will continue to benefit from skilled outpatient physical therapy to address the deficits listed in the problem list, provide pt/family education and to maximize pt's level of independence in the home and community environment.     LONG TERM GOALS:  Long Term Goals: 8 weeks  Pain: Decrease pain to no more than 4/10 to allow for improved ability to perform daily activities   Functional scale: Improve score on Oswestry to <45% impairment   Walking: Increase walking distance/duration to 6 mins without pain  Postures: Increase sitting and/or standing duration to 15 mins  without pain   Transfers: Perform sit <> Stand  transfers without increased pain or limitation  Exercise: demonstrate independence with home exercise program to maintain gains made in therapy.       Plan     Continue with established Plan of Care towards PT goals.    Therapist: Jonathan Favre, PTA  7/16/2020

## 2020-07-23 ENCOUNTER — CLINICAL SUPPORT (OUTPATIENT)
Dept: REHABILITATION | Facility: HOSPITAL | Age: 85
End: 2020-07-23
Payer: MEDICARE

## 2020-07-23 DIAGNOSIS — M62.81 MUSCLE WEAKNESS (GENERALIZED): ICD-10-CM

## 2020-07-23 DIAGNOSIS — M54.16 CHRONIC RADICULAR PAIN OF LOWER BACK: Primary | ICD-10-CM

## 2020-07-23 DIAGNOSIS — G89.29 CHRONIC RADICULAR PAIN OF LOWER BACK: Primary | ICD-10-CM

## 2020-07-23 PROCEDURE — 97110 THERAPEUTIC EXERCISES: CPT | Mod: PN,CQ

## 2020-07-23 NOTE — PROGRESS NOTES
Physical Therapy Daily Note     Name: Jeffrey THOMAS Saucier  [facility] Number: [id number]  Diagnosis:   Encounter Diagnoses   Name Primary?    Chronic radicular pain of lower back Yes    Muscle weakness (generalized)      Physician: Cinthia Shrestha MD  Precautions: Standard  Visit #: 8 of 12  PTA Visit #: 2  Time In: 3:20 PM  Time Out: 4:25 PM     Subjective     Pt reports:  No new c/o's.   Pain Scale: Jeffrey rates pain on a scale of 0-10 to be 5 currently.    Objective     Jeffrey received individual therapeutic exercises to develop strength, endurance, flexibility, posture and core stabilization for 45 minutes including:  Nustep level 5 x 30 mins  Pelvic Tilts x 20  Bridges x 15  Ball Squeezes x 3 mins  Bent Knee Fallouts x 15   DKC x 3 mins with SB  Lumbar Rolls x 3 mins with SB  Seated Lumbar Flexion with SB x 4 mins    DNP  Jeffrey received the following manual therapy techniques:  were applied to the: Lumbar spine for 12  minutes including: S/L flexion to right/left lumbar spine - all levels; dynamic hamstring stretch BLE's;; lumbar/hip stretch into rotation on each side.        The patient received the following direct contact modalities after being cleared for contraindications:     The patient received the following supervised modalities after being cleared for contradictions:    Written Home Exercises Provided:   Pt demo good understanding of the education provided. Jeffrey demonstrated good return demonstration of activities.     Education provided re:  Jeffrey verbalized good understanding of education provided.   No spiritual or educational barriers to learning provided    Assessment     Patient tolerated exercises fairly well; BLE's are very swollen today- had to cut a pair of Crocs to be able to get them on; going to a different cardiologist next week; using inversion table at home on more frequent basis; sits too much, LE's are weak, putting a lot of stress on  walker with UE's during gait; will continue to progress as patient tolerates to improve strength and mobility and decrease pain.   This is a 87 y.o. male referred to outpatient physical therapy and presents with a medical diagnosis of Chronic Lumbar stenosis with LLE radicular pain as well as chronic Right shoulder pain and demonstrates limitations as described in the problem list. Pt prognosis is Good. Pt will continue to benefit from skilled outpatient physical therapy to address the deficits listed in the problem list, provide pt/family education and to maximize pt's level of independence in the home and community environment.     LONG TERM GOALS:  Long Term Goals: 8 weeks  Pain: Decrease pain to no more than 4/10 to allow for improved ability to perform daily activities   Functional scale: Improve score on Oswestry to <45% impairment   Walking: Increase walking distance/duration to 6 mins without pain  Postures: Increase sitting and/or standing duration to 15 mins  without pain   Transfers: Perform sit <> Stand  transfers without increased pain or limitation  Exercise: demonstrate independence with home exercise program to maintain gains made in therapy.       Plan     Continue with established Plan of Care towards PT goals.    Therapist: Jonathan Favre, PTA  7/23/2020

## 2020-07-27 DIAGNOSIS — K94.31: ICD-10-CM

## 2020-07-27 DIAGNOSIS — I51.9 HEART DISEASE, UNSPECIFIED: ICD-10-CM

## 2020-07-27 DIAGNOSIS — R07.9 CHEST PAIN, UNSPECIFIED: ICD-10-CM

## 2020-07-27 DIAGNOSIS — K21.9 ESOPHAGEAL REFLUX: ICD-10-CM

## 2020-07-27 DIAGNOSIS — I82.90 ANTEMORTEM THROMBUS: Primary | ICD-10-CM

## 2020-07-28 ENCOUNTER — CLINICAL SUPPORT (OUTPATIENT)
Dept: REHABILITATION | Facility: HOSPITAL | Age: 85
End: 2020-07-28
Payer: MEDICARE

## 2020-07-28 DIAGNOSIS — R07.9 CHEST PAIN, UNSPECIFIED: ICD-10-CM

## 2020-07-28 DIAGNOSIS — G89.29 CHRONIC RADICULAR PAIN OF LOWER BACK: Primary | ICD-10-CM

## 2020-07-28 DIAGNOSIS — I82.90 ANTEMORTEM THROMBUS: Primary | ICD-10-CM

## 2020-07-28 DIAGNOSIS — M62.81 MUSCLE WEAKNESS (GENERALIZED): ICD-10-CM

## 2020-07-28 DIAGNOSIS — R94.31 NONSPECIFIC ABNORMAL ELECTROCARDIOGRAM (ECG) (EKG): ICD-10-CM

## 2020-07-28 DIAGNOSIS — I51.9 HEART DISEASE, UNSPECIFIED: ICD-10-CM

## 2020-07-28 DIAGNOSIS — M54.16 CHRONIC RADICULAR PAIN OF LOWER BACK: Primary | ICD-10-CM

## 2020-07-28 DIAGNOSIS — K21.9 ESOPHAGEAL REFLUX: ICD-10-CM

## 2020-07-28 PROCEDURE — 97110 THERAPEUTIC EXERCISES: CPT | Mod: PN,CQ

## 2020-07-28 NOTE — PROGRESS NOTES
Physical Therapy Daily Note     Name: Jeffrey Ocasio  [facility] Number: [id number]  Diagnosis:   Encounter Diagnoses   Name Primary?    Chronic radicular pain of lower back Yes    Muscle weakness (generalized)      Physician: Cinthia Shrestha MD  Precautions: Standard  Visit #: 9 of 12  PTA Visit #: 3  Time In: 3:25 PM  Time Out: 4:25 PM     Subjective     Pt reports:  I am not doing too good.   Pain Scale: Jeffrey rates pain on a scale of 0-10 to be 6 currently.    Objective     Jeffrey received individual therapeutic exercises to develop strength, endurance, flexibility, posture and core stabilization for 45 minutes including:  Nustep level 5 x 30 mins  Pelvic Tilts x 20  Bridges x 15  Ball Squeezes x 3 mins  Bent Knee Fallouts x 15   DKC x 3 mins with SB  Lumbar Rolls x 3 mins with SB  Seated Lumbar Flexion with SB x 4 mins    DNP  Jeffrey received the following manual therapy techniques:  were applied to the: Lumbar spine for 12  minutes including: S/L flexion to right/left lumbar spine - all levels; dynamic hamstring stretch BLE's;; lumbar/hip stretch into rotation on each side.        The patient received the following direct contact modalities after being cleared for contraindications:     The patient received the following supervised modalities after being cleared for contradictions:    Written Home Exercises Provided:   Pt demo good understanding of the education provided. Jeffrey demonstrated good return demonstration of activities.     Education provided re:  Jeffrey verbalized good understanding of education provided.   No spiritual or educational barriers to learning provided    Assessment     Patient tolerated exercises fairly well; using inversion table at home on more frequent basis; sits too much, LE's are weak, putting a lot of stress on walker with UE's during gait; will continue to progress as patient tolerates to improve strength and mobility and  decrease pain.   This is a 87 y.o. male referred to outpatient physical therapy and presents with a medical diagnosis of Chronic Lumbar stenosis with LLE radicular pain as well as chronic Right shoulder pain and demonstrates limitations as described in the problem list. Pt prognosis is Good. Pt will continue to benefit from skilled outpatient physical therapy to address the deficits listed in the problem list, provide pt/family education and to maximize pt's level of independence in the home and community environment.     LONG TERM GOALS:  Long Term Goals: 8 weeks  Pain: Decrease pain to no more than 4/10 to allow for improved ability to perform daily activities   Functional scale: Improve score on Oswestry to <45% impairment   Walking: Increase walking distance/duration to 6 mins without pain  Postures: Increase sitting and/or standing duration to 15 mins  without pain   Transfers: Perform sit <> Stand  transfers without increased pain or limitation  Exercise: demonstrate independence with home exercise program to maintain gains made in therapy.       Plan     Continue with established Plan of Care towards PT goals.    Therapist: Jonathan Favre, PTA  7/28/2020

## 2020-07-30 ENCOUNTER — CLINICAL SUPPORT (OUTPATIENT)
Dept: REHABILITATION | Facility: HOSPITAL | Age: 85
End: 2020-07-30
Payer: MEDICARE

## 2020-07-30 DIAGNOSIS — M54.16 CHRONIC RADICULAR PAIN OF LOWER BACK: Primary | ICD-10-CM

## 2020-07-30 DIAGNOSIS — R26.81 GAIT INSTABILITY: ICD-10-CM

## 2020-07-30 DIAGNOSIS — G89.29 CHRONIC RADICULAR PAIN OF LOWER BACK: Primary | ICD-10-CM

## 2020-07-30 PROCEDURE — 97140 MANUAL THERAPY 1/> REGIONS: CPT | Mod: PN

## 2020-07-30 PROCEDURE — 97110 THERAPEUTIC EXERCISES: CPT | Mod: PN

## 2020-07-30 NOTE — PROGRESS NOTES
Physical Therapy Daily Note     Name: Jeffrey Ocasio  [facility] Number: [id number]  Diagnosis:   Encounter Diagnoses   Name Primary?    Gait instability     Chronic radicular pain of lower back Yes     Physician: Cinthia Shrestha MD  Precautions: Standard  Visit #: 10 of 12  PTA Visit #: 3  Time In:  9:30 am   Time Out:  10:40 am     Subjective     Pt reports:   Jeffrey is reporting less swelling in his legs - ankles look the best I've seen in several weeks. Continues with lower back and bilateral leg pain   Pain Scale: Jeffrey rates pain on a scale of 0-10 to be 6 currently.    Objective     Jeffrey received individual therapeutic exercises to develop strength, endurance, flexibility, posture and core stabilization for 45 minutes including:  Nustep level 5 x 25 mins  Pelvic Tilts x 20  Bridges x 15  Ball Squeezes x 3 mins  Bent Knee Fallouts x 15   DKC x 3 mins with SB  Lumbar Rolls x 3 mins with SB  Seated Lumbar Flexion with SB x 4 mins      Jeffrey received the following manual therapy techniques:  were applied to the: Lumbar spine for 12  minutes including: S/L flexion to right/left lumbar spine - all levels; dynamic hamstring stretch BLE's;; lumbar/hip stretch into rotation on each side.        The patient received the following direct contact modalities after being cleared for contraindications:     The patient received the following supervised modalities after being cleared for contradictions:    Written Home Exercises Provided:   Pt demo good understanding of the education provided. Jeffrey demonstrated good return demonstration of activities.     Education provided re:  Jeffrey verbalized good understanding of education provided.   No spiritual or educational barriers to learning provided    Assessment     Patient tolerated exercises fairly well; Finally making some improvement in his ankle swelling with change in meds and elevation of legs above his heart.   Jeffrey continues to demonstrate antalgic gait pattern with use of RW; increased forward trunk flexion; flexed hips/knees; He is able to drive himself to therapy now - this is an improvement.    This is a 87 y.o. male referred to outpatient physical therapy and presents with a medical diagnosis of Chronic Lumbar stenosis with LLE radicular pain as well as chronic Right shoulder pain and demonstrates limitations as described in the problem list. Pt prognosis is Good. Pt will continue to benefit from skilled outpatient physical therapy to address the deficits listed in the problem list, provide pt/family education and to maximize pt's level of independence in the home and community environment.     LONG TERM GOALS:  Long Term Goals: 8 weeks  Pain: Decrease pain to no more than 4/10 to allow for improved ability to perform daily activities   Functional scale: Improve score on Oswestry to <45% impairment   Walking: Increase walking distance/duration to 6 mins without pain  Postures: Increase sitting and/or standing duration to 15 mins  without pain   Transfers: Perform sit <> Stand  transfers without increased pain or limitation  Exercise: demonstrate independence with home exercise program to maintain gains made in therapy.       Plan     Continue with established Plan of Care towards PT goals.    Therapist: Dena Bauman, PT  7/30/2020

## 2020-08-04 ENCOUNTER — CLINICAL SUPPORT (OUTPATIENT)
Dept: REHABILITATION | Facility: HOSPITAL | Age: 85
End: 2020-08-04
Payer: MEDICARE

## 2020-08-04 DIAGNOSIS — M54.16 CHRONIC RADICULAR PAIN OF LOWER BACK: Primary | ICD-10-CM

## 2020-08-04 DIAGNOSIS — G89.29 CHRONIC RADICULAR PAIN OF LOWER BACK: Primary | ICD-10-CM

## 2020-08-04 DIAGNOSIS — M62.81 MUSCLE WEAKNESS (GENERALIZED): ICD-10-CM

## 2020-08-04 PROCEDURE — 97110 THERAPEUTIC EXERCISES: CPT | Mod: PN,CQ

## 2020-08-04 NOTE — PROGRESS NOTES
Physical Therapy Daily Note     Name: Jeffrey Ocasio  [facility] Number: [id number]  Diagnosis:   Encounter Diagnoses   Name Primary?    Chronic radicular pain of lower back Yes    Muscle weakness (generalized)      Physician: Cinthia Shrestha MD  Precautions: Standard  Visit #: 11 of 12  PTA Visit #: 1  Time In:  10:15 AM  Time Out:  11:20 AM    Subjective     Pt reports:   Jeffrey reports that he had a fall the other day and hit his left shoulder; laid on the floor over an hour before he get up with assistance.   Pain Scale: Jeffrey rates pain on a scale of 0-10 to be 7 currently.    Objective     Jeffrey received individual therapeutic exercises to develop strength, endurance, flexibility, posture and core stabilization for 45 minutes including:  Nustep level 5 x 25 mins  Seated Scapular Retraction x 30 with Black TB  Pelvic Tilts x 20  Bridges x 15  Ball Squeezes x 3 mins  Bent Knee Fallouts x 15   DKC x 3 mins with SB  Lumbar Rolls x 3 mins with SB  Seated Lumbar Flexion with SB x 4 mins      Jeffrey received the following manual therapy techniques:  were applied to the: Lumbar spine for 12  minutes including: S/L flexion to right/left lumbar spine - all levels; dynamic hamstring stretch BLE's;; lumbar/hip stretch into rotation on each side.        The patient received the following direct contact modalities after being cleared for contraindications:     The patient received the following supervised modalities after being cleared for contradictions:    Written Home Exercises Provided:   Pt demo good understanding of the education provided. Jeffrey demonstrated good return demonstration of activities.     Education provided re:  Jeffrey verbalized good understanding of education provided.   No spiritual or educational barriers to learning provided    Assessment     Patient tolerated exercises fairly well; Finally making some improvement in his ankle swelling with  change in meds and elevation of legs above his heart.  Jeffrey continues to demonstrate antalgic gait pattern with use of RW; increased forward trunk flexion; flexed hips/knees; He is able to drive himself to therapy now - this is an improvement.    This is a 87 y.o. male referred to outpatient physical therapy and presents with a medical diagnosis of Chronic Lumbar stenosis with LLE radicular pain as well as chronic Right shoulder pain and demonstrates limitations as described in the problem list. Pt prognosis is Good. Pt will continue to benefit from skilled outpatient physical therapy to address the deficits listed in the problem list, provide pt/family education and to maximize pt's level of independence in the home and community environment.     LONG TERM GOALS:  Long Term Goals: 8 weeks  Pain: Decrease pain to no more than 4/10 to allow for improved ability to perform daily activities   Functional scale: Improve score on Oswestry to <45% impairment   Walking: Increase walking distance/duration to 6 mins without pain  Postures: Increase sitting and/or standing duration to 15 mins  without pain   Transfers: Perform sit <> Stand  transfers without increased pain or limitation  Exercise: demonstrate independence with home exercise program to maintain gains made in therapy.       Plan     Continue with established Plan of Care towards PT goals.    Therapist: Jonathan Favre, PTA  8/4/2020

## 2020-08-05 ENCOUNTER — HOSPITAL ENCOUNTER (OUTPATIENT)
Dept: RADIOLOGY | Facility: HOSPITAL | Age: 85
Discharge: HOME | End: 2020-08-05
Attending: INTERNAL MEDICINE
Payer: MEDICARE

## 2020-08-05 DIAGNOSIS — I51.9 HEART DISEASE, UNSPECIFIED: ICD-10-CM

## 2020-08-05 DIAGNOSIS — K21.9 ESOPHAGEAL REFLUX: ICD-10-CM

## 2020-08-05 DIAGNOSIS — R07.9 CHEST PAIN, UNSPECIFIED: ICD-10-CM

## 2020-08-05 DIAGNOSIS — R94.31 NONSPECIFIC ABNORMAL ELECTROCARDIOGRAM (ECG) (EKG): ICD-10-CM

## 2020-08-05 DIAGNOSIS — I82.90 ANTEMORTEM THROMBUS: ICD-10-CM

## 2020-08-05 PROCEDURE — 93970 EXTREMITY STUDY: CPT | Mod: TC

## 2020-08-06 ENCOUNTER — CLINICAL SUPPORT (OUTPATIENT)
Dept: REHABILITATION | Facility: HOSPITAL | Age: 85
End: 2020-08-06
Payer: MEDICARE

## 2020-08-06 DIAGNOSIS — M62.81 MUSCLE WEAKNESS (GENERALIZED): ICD-10-CM

## 2020-08-06 DIAGNOSIS — G89.29 CHRONIC RADICULAR PAIN OF LOWER BACK: Primary | ICD-10-CM

## 2020-08-06 DIAGNOSIS — M54.16 CHRONIC RADICULAR PAIN OF LOWER BACK: Primary | ICD-10-CM

## 2020-08-06 PROCEDURE — 97110 THERAPEUTIC EXERCISES: CPT | Mod: PN,CQ

## 2020-08-06 NOTE — PROGRESS NOTES
Physical Therapy Daily Note     Name: Jeffrey Ocasio  [facility] Number: [id number]  Diagnosis:   Encounter Diagnoses   Name Primary?    Chronic radicular pain of lower back Yes    Muscle weakness (generalized)      Physician: Cinthia Shrestha MD  Precautions: Standard  Visit #: 12 of 12  PTA Visit #: 2  Time In:  2:45 PM  Time Out:  3:50 PM    Subjective     Pt reports:  No new c/o's.   Pain Scale: Jeffrey rates pain on a scale of 0-10 to be 8 currently.    Objective     Jeffrey received individual therapeutic exercises to develop strength, endurance, flexibility, posture and core stabilization for 45 minutes including:  Nustep level 5 x 25 mins  Seated Scapular Retraction x 30 with Black TB  Pelvic Tilts x 20  Bridges x 15  Ball Squeezes x 3 mins  Bent Knee Fallouts x 15   DKC x 3 mins with SB  Lumbar Rolls x 3 mins with SB  Seated Lumbar Flexion with SB x 4 mins    DNP  Jeffrey received the following manual therapy techniques:  were applied to the: Lumbar spine for 12  minutes including: S/L flexion to right/left lumbar spine - all levels; dynamic hamstring stretch BLE's;; lumbar/hip stretch into rotation on each side.        The patient received the following direct contact modalities after being cleared for contraindications:     The patient received the following supervised modalities after being cleared for contradictions:    Written Home Exercises Provided:   Pt demo good understanding of the education provided. Jeffrey demonstrated good return demonstration of activities.     Education provided re:  Jeffrey verbalized good understanding of education provided.   No spiritual or educational barriers to learning provided    Assessment     Patient tolerated exercises fairly well; Finally making some improvement in his ankle swelling with change in meds and elevation of legs above his heart.  Jeffrey continues to demonstrate antalgic gait pattern with use of RW;  increased forward trunk flexion; flexed hips/knees; He is able to drive himself to therapy now - this is an improvement.    This is a 87 y.o. male referred to outpatient physical therapy and presents with a medical diagnosis of Chronic Lumbar stenosis with LLE radicular pain as well as chronic Right shoulder pain and demonstrates limitations as described in the problem list. Pt prognosis is Good. Pt will continue to benefit from skilled outpatient physical therapy to address the deficits listed in the problem list, provide pt/family education and to maximize pt's level of independence in the home and community environment.     LONG TERM GOALS:  Long Term Goals: 8 weeks  Pain: Decrease pain to no more than 4/10 to allow for improved ability to perform daily activities   Functional scale: Improve score on Oswestry to <45% impairment   Walking: Increase walking distance/duration to 6 mins without pain  Postures: Increase sitting and/or standing duration to 15 mins  without pain   Transfers: Perform sit <> Stand  transfers without increased pain or limitation  Exercise: demonstrate independence with home exercise program to maintain gains made in therapy.       Plan     Continue with established Plan of Care towards PT goals.    Therapist: Jonathan Favre, PTA  8/6/2020

## 2020-08-11 ENCOUNTER — CLINICAL SUPPORT (OUTPATIENT)
Dept: REHABILITATION | Facility: HOSPITAL | Age: 85
End: 2020-08-11
Payer: MEDICARE

## 2020-08-11 DIAGNOSIS — M54.16 CHRONIC RADICULAR PAIN OF LOWER BACK: Primary | ICD-10-CM

## 2020-08-11 DIAGNOSIS — G89.29 CHRONIC RADICULAR PAIN OF LOWER BACK: Primary | ICD-10-CM

## 2020-08-11 PROCEDURE — 97110 THERAPEUTIC EXERCISES: CPT | Mod: PN,CQ

## 2020-08-11 NOTE — PROGRESS NOTES
Physical Therapy Daily Note     Name: Jeffrey Ocasio  [facility] Number: [id number]  Diagnosis:   Encounter Diagnosis   Name Primary?    Chronic radicular pain of lower back Yes     Physician: Cinthia Shrestha MD  Precautions: Standard  Visit #: 13 of 18  PTA Visit #: 3  Time In:  2:00 PM  Time Out:  3:00 PM    Subjective     Pt reports:  No new c/o's.   Pain Scale: Jeffrey rates pain on a scale of 0-10 to be 6-7 currently.    Objective     Jeffrey received individual therapeutic exercises to develop strength, endurance, flexibility, posture and core stabilization for 45 minutes including:  Nustep level 5 x 30 mins  Seated Scapular Retraction x 30 with Black TB  Pelvic Tilts x 20  Bridges x 15  Ball Squeezes x 3 mins  Bent Knee Fallouts x 15   DKC x 3 mins with SB  Lumbar Rolls x 3 mins with SB  Seated Lumbar Flexion with SB x 4 mins    DNP  Jeffrey received the following manual therapy techniques:  were applied to the: Lumbar spine for 12  minutes including: S/L flexion to right/left lumbar spine - all levels; dynamic hamstring stretch BLE's;; lumbar/hip stretch into rotation on each side.        The patient received the following direct contact modalities after being cleared for contraindications:     The patient received the following supervised modalities after being cleared for contradictions:    Written Home Exercises Provided:   Pt demo good understanding of the education provided. Jeffrey demonstrated good return demonstration of activities.     Education provided re:  Jeffrey verbalized good understanding of education provided.   No spiritual or educational barriers to learning provided    Assessment     Patient tolerated exercises fairly well; Finally making some improvement in his ankle swelling with change in meds and elevation of legs above his heart.  Jeffrey continues to demonstrate antalgic gait pattern with use of RW; increased forward trunk flexion; flexed  hips/knees; He is able to drive himself to therapy now - this is an improvement.    This is a 87 y.o. male referred to outpatient physical therapy and presents with a medical diagnosis of Chronic Lumbar stenosis with LLE radicular pain as well as chronic Right shoulder pain and demonstrates limitations as described in the problem list. Pt prognosis is Good. Pt will continue to benefit from skilled outpatient physical therapy to address the deficits listed in the problem list, provide pt/family education and to maximize pt's level of independence in the home and community environment.     LONG TERM GOALS:  Long Term Goals: 8 weeks  Pain: Decrease pain to no more than 4/10 to allow for improved ability to perform daily activities   Functional scale: Improve score on Oswestry to <45% impairment   Walking: Increase walking distance/duration to 6 mins without pain  Postures: Increase sitting and/or standing duration to 15 mins  without pain   Transfers: Perform sit <> Stand  transfers without increased pain or limitation  Exercise: demonstrate independence with home exercise program to maintain gains made in therapy.       Plan     Continue with established Plan of Care towards PT goals.    Therapist: Jonathan Favre, PTA  8/11/2020

## 2020-08-13 ENCOUNTER — CLINICAL SUPPORT (OUTPATIENT)
Dept: REHABILITATION | Facility: HOSPITAL | Age: 85
End: 2020-08-13
Payer: MEDICARE

## 2020-08-13 DIAGNOSIS — M62.81 MUSCLE WEAKNESS (GENERALIZED): ICD-10-CM

## 2020-08-13 DIAGNOSIS — M25.511 CHRONIC RIGHT SHOULDER PAIN: ICD-10-CM

## 2020-08-13 DIAGNOSIS — M54.16 CHRONIC RADICULAR PAIN OF LOWER BACK: Primary | ICD-10-CM

## 2020-08-13 DIAGNOSIS — G89.29 CHRONIC RIGHT SHOULDER PAIN: ICD-10-CM

## 2020-08-13 DIAGNOSIS — G89.29 CHRONIC RADICULAR PAIN OF LOWER BACK: Primary | ICD-10-CM

## 2020-08-13 PROCEDURE — 97110 THERAPEUTIC EXERCISES: CPT | Mod: PN,CQ

## 2020-08-13 NOTE — PROGRESS NOTES
Physical Therapy Daily Note     Name: Jeffrey Ocasio  [facility] Number: [id number]  Diagnosis:   Encounter Diagnoses   Name Primary?    Chronic radicular pain of lower back Yes    Chronic right shoulder pain     Muscle weakness (generalized)      Physician: Cinthia Shrestha MD  Precautions: Standard  Visit #: 14 of 18  PTA Visit #: 4  Time In:  10:10 AM  Time Out:  11:10 AM    Subjective     Pt reports:  No new c/o's.   Pain Scale: Jeffrey rates pain on a scale of 0-10 to be 6-7 in right shld and 2-3 in low back currently.    Objective     Jeffrey received individual therapeutic exercises to develop strength, endurance, flexibility, posture and core stabilization for 45 minutes including:  Nustep level 5 x 30 mins  Seated Scapular Retraction x 30 with Black TB  Pelvic Tilts x 20  Bridges x 15   Ball Squeezes x 3 mins  Bent Knee Fallouts x 15   DKC x 3 mins with SB  Lumbar Rolls x 3 mins with SB  Seated Lumbar Flexion with SB x 4 mins    DNP  Jeffrey received the following manual therapy techniques:  were applied to the: Lumbar spine for 12  minutes including: S/L flexion to right/left lumbar spine - all levels; dynamic hamstring stretch BLE's;; lumbar/hip stretch into rotation on each side.        The patient received the following direct contact modalities after being cleared for contraindications:     The patient received the following supervised modalities after being cleared for contradictions:    Written Home Exercises Provided:   Pt demo good understanding of the education provided. Jeffrey demonstrated good return demonstration of activities.     Education provided re:  Jeffrey verbalized good understanding of education provided.   No spiritual or educational barriers to learning provided    Assessment     Patient tolerated exercises fairly well; Finally making some improvement in his ankle swelling with change in meds and elevation of legs above his heart.   Jeffrey continues to demonstrate antalgic gait pattern with use of RW; increased forward trunk flexion; flexed hips/knees; He is able to drive himself to therapy now - this is an improvement.    This is a 87 y.o. male referred to outpatient physical therapy and presents with a medical diagnosis of Chronic Lumbar stenosis with LLE radicular pain as well as chronic Right shoulder pain and demonstrates limitations as described in the problem list. Pt prognosis is Good. Pt will continue to benefit from skilled outpatient physical therapy to address the deficits listed in the problem list, provide pt/family education and to maximize pt's level of independence in the home and community environment.     LONG TERM GOALS:  Long Term Goals: 8 weeks  Pain: Decrease pain to no more than 4/10 to allow for improved ability to perform daily activities   Functional scale: Improve score on Oswestry to <45% impairment   Walking: Increase walking distance/duration to 6 mins without pain  Postures: Increase sitting and/or standing duration to 15 mins  without pain   Transfers: Perform sit <> Stand  transfers without increased pain or limitation  Exercise: demonstrate independence with home exercise program to maintain gains made in therapy.       Plan     Continue with established Plan of Care towards PT goals.    Therapist: Jonathan Favre, PTA  8/13/2020

## 2020-08-18 ENCOUNTER — CLINICAL SUPPORT (OUTPATIENT)
Dept: REHABILITATION | Facility: HOSPITAL | Age: 85
End: 2020-08-18
Payer: MEDICARE

## 2020-08-18 DIAGNOSIS — G89.29 CHRONIC RADICULAR PAIN OF LOWER BACK: Primary | ICD-10-CM

## 2020-08-18 DIAGNOSIS — M54.16 CHRONIC RADICULAR PAIN OF LOWER BACK: Primary | ICD-10-CM

## 2020-08-18 DIAGNOSIS — M62.81 MUSCLE WEAKNESS (GENERALIZED): ICD-10-CM

## 2020-08-18 PROCEDURE — 97110 THERAPEUTIC EXERCISES: CPT | Mod: PN,CQ

## 2020-08-18 NOTE — PROGRESS NOTES
Physical Therapy Daily Note     Name: Jeffrey Ocasio  [facility] Number: [id number]  Diagnosis:   Encounter Diagnoses   Name Primary?    Chronic radicular pain of lower back Yes    Muscle weakness (generalized)      Physician: Cinthia Shrestha MD  Precautions: Standard  Visit #: 15 of 18  PTA Visit #: 5  Time In:  1:05 PM  Time Out:  2:05 PM    Subjective     Pt reports:  No new c/o's.   Pain Scale: Jeffrey rates pain on a scale of 0-10 to be 7 in right shld and 3 in low back currently.    Objective     Jeffrey received individual therapeutic exercises to develop strength, endurance, flexibility, posture and core stabilization for 45 minutes including:  Nustep level 5 x 30 mins  Seated Scapular Retraction x 30 with Black TB  Pelvic Tilts x 20  Bridges x 15   Ball Squeezes x 3 mins  Bent Knee Fallouts x 15   DKC x 3 mins with SB  Lumbar Rolls x 3 mins with SB  Seated Lumbar Flexion with SB x 4 mins    DNP  Jeffrey received the following manual therapy techniques:  were applied to the: Lumbar spine for 12  minutes including: S/L flexion to right/left lumbar spine - all levels; dynamic hamstring stretch BLE's;; lumbar/hip stretch into rotation on each side.        The patient received the following direct contact modalities after being cleared for contraindications:     The patient received the following supervised modalities after being cleared for contradictions:    Written Home Exercises Provided:   Pt demo good understanding of the education provided. Jeffrey demonstrated good return demonstration of activities.     Education provided re:  Jeffrey verbalized good understanding of education provided.   No spiritual or educational barriers to learning provided    Assessment     Patient tolerated exercises fairly well; Finally making some improvement in his ankle swelling with change in meds and elevation of legs above his heart.  Jeffrey continues to demonstrate antalgic  gait pattern with use of RW; increased forward trunk flexion; flexed hips/knees; He is able to drive himself to therapy now - this is an improvement.    This is a 87 y.o. male referred to outpatient physical therapy and presents with a medical diagnosis of Chronic Lumbar stenosis with LLE radicular pain as well as chronic Right shoulder pain and demonstrates limitations as described in the problem list. Pt prognosis is Good. Pt will continue to benefit from skilled outpatient physical therapy to address the deficits listed in the problem list, provide pt/family education and to maximize pt's level of independence in the home and community environment.     LONG TERM GOALS:  Long Term Goals: 8 weeks  Pain: Decrease pain to no more than 4/10 to allow for improved ability to perform daily activities   Functional scale: Improve score on Oswestry to <45% impairment   Walking: Increase walking distance/duration to 6 mins without pain  Postures: Increase sitting and/or standing duration to 15 mins  without pain   Transfers: Perform sit <> Stand  transfers without increased pain or limitation  Exercise: demonstrate independence with home exercise program to maintain gains made in therapy.       Plan     Continue with established Plan of Care towards PT goals.    Therapist: Jonathan Favre, PTA  8/18/2020

## 2020-08-20 ENCOUNTER — CLINICAL SUPPORT (OUTPATIENT)
Dept: REHABILITATION | Facility: HOSPITAL | Age: 85
End: 2020-08-20
Payer: MEDICARE

## 2020-08-20 DIAGNOSIS — G89.29 CHRONIC RADICULAR PAIN OF LOWER BACK: Primary | ICD-10-CM

## 2020-08-20 DIAGNOSIS — M54.16 CHRONIC RADICULAR PAIN OF LOWER BACK: Primary | ICD-10-CM

## 2020-08-20 PROCEDURE — 97110 THERAPEUTIC EXERCISES: CPT | Mod: PN,CQ

## 2020-08-20 NOTE — PROGRESS NOTES
Physical Therapy Daily Note     Name: Jeffrey Ocasio  [facility] Number: [id number]  Diagnosis:   Encounter Diagnosis   Name Primary?    Chronic radicular pain of lower back Yes     Physician: Cinthia Shrestha MD  Precautions: Standard  Visit #: 16 of 18  PTA Visit #: 6  Time In:  1:05 PM  Time Out:  2:05 PM    Subjective     Pt reports:  No new c/o's.   Pain Scale: Jeffrey rates pain on a scale of 0-10 to be 5 in right shld and 3 in low back currently.    Objective     Jeffrey received individual therapeutic exercises to develop strength, endurance, flexibility, posture and core stabilization for 45 minutes including:  Nustep level 5 x 30 mins  Standing Scapular Retraction x 30 with Black TB  Standing Wall Slides x 15  Pelvic Tilts x 20  Bridges x 15   Ball Squeezes x 3 mins  Bent Knee Fallouts x 15   DKC x 3 mins with SB  Lumbar Rolls x 3 mins with SB  Seated Lumbar Flexion with SB x 4 mins    DNP  Jeffrey received the following manual therapy techniques:  were applied to the: Lumbar spine for 12  minutes including: S/L flexion to right/left lumbar spine - all levels; dynamic hamstring stretch BLE's;; lumbar/hip stretch into rotation on each side.        The patient received the following direct contact modalities after being cleared for contraindications:     The patient received the following supervised modalities after being cleared for contradictions:    Written Home Exercises Provided:   Pt demo good understanding of the education provided. Jeffrey demonstrated good return demonstration of activities.     Education provided re:  Jeffrey verbalized good understanding of education provided.   No spiritual or educational barriers to learning provided    Assessment     Patient tolerated exercises fairly well; Finally making some improvement in his ankle swelling with change in meds and elevation of legs above his heart.  Jeffrey continues to demonstrate antalgic gait  pattern with use of RW; increased forward trunk flexion; flexed hips/knees; He is able to drive himself to therapy now - this is an improvement.    This is a 87 y.o. male referred to outpatient physical therapy and presents with a medical diagnosis of Chronic Lumbar stenosis with LLE radicular pain as well as chronic Right shoulder pain and demonstrates limitations as described in the problem list. Pt prognosis is Good. Pt will continue to benefit from skilled outpatient physical therapy to address the deficits listed in the problem list, provide pt/family education and to maximize pt's level of independence in the home and community environment.     LONG TERM GOALS:  Long Term Goals: 8 weeks  Pain: Decrease pain to no more than 4/10 to allow for improved ability to perform daily activities   Functional scale: Improve score on Oswestry to <45% impairment   Walking: Increase walking distance/duration to 6 mins without pain  Postures: Increase sitting and/or standing duration to 15 mins  without pain   Transfers: Perform sit <> Stand  transfers without increased pain or limitation  Exercise: demonstrate independence with home exercise program to maintain gains made in therapy.       Plan     Continue with established Plan of Care towards PT goals.    Therapist: Jonathan Favre, PTA  8/20/2020

## 2020-08-25 ENCOUNTER — CLINICAL SUPPORT (OUTPATIENT)
Dept: REHABILITATION | Facility: HOSPITAL | Age: 85
End: 2020-08-25
Payer: MEDICARE

## 2020-08-25 DIAGNOSIS — M54.16 CHRONIC RADICULAR PAIN OF LOWER BACK: Primary | ICD-10-CM

## 2020-08-25 DIAGNOSIS — G89.29 CHRONIC RADICULAR PAIN OF LOWER BACK: Primary | ICD-10-CM

## 2020-08-25 PROCEDURE — 97110 THERAPEUTIC EXERCISES: CPT | Mod: PN,CQ

## 2020-08-25 NOTE — PROGRESS NOTES
Physical Therapy Daily Note     Name: Jeffrey Ocasio  [facility] Number: [id number]  Diagnosis:   Encounter Diagnosis   Name Primary?    Chronic radicular pain of lower back Yes     Physician: Cinthia Shrestha MD  Precautions: Standard  Visit #: 17 of 18  PTA Visit #: 1  Time In:  3:30 PM  Time Out:  4:30 PM    Subjective     Pt reports:  No new c/o's.   Pain Scale: Jeffrey rates pain on a scale of 0-10 to be 5 in right shld and 8 in low back currently.    Objective     Jeffrey received individual therapeutic exercises to develop strength, endurance, flexibility, posture and core stabilization for 45 minutes including:  Nustep level 5 x 30 mins  Standing Scapular Retraction x 30 with Black TB  Standing Wall Slides x 15  Pelvic Tilts x 20  Bridges x 15   Ball Squeezes x 3 mins  Bent Knee Fallouts x 15   DKC x 15 with SB  Lumbar Rolls x 15 with SB  Seated Lumbar Flexion with SB x 4 mins    DNP  Jeffrey received the following manual therapy techniques:  were applied to the: Lumbar spine for 12  minutes including: S/L flexion to right/left lumbar spine - all levels; dynamic hamstring stretch BLE's;; lumbar/hip stretch into rotation on each side.        The patient received the following direct contact modalities after being cleared for contraindications:     The patient received the following supervised modalities after being cleared for contradictions:    Written Home Exercises Provided:   Pt demo good understanding of the education provided. Jeffrey demonstrated good return demonstration of activities.     Education provided re:  Jeffrey verbalized good understanding of education provided.   No spiritual or educational barriers to learning provided    Assessment     Patient tolerated exercises fairly well; Finally making some improvement in his ankle swelling with change in meds and elevation of legs above his heart.  Jeffrey continues to demonstrate antalgic gait pattern  with use of RW; increased forward trunk flexion; flexed hips/knees; He is able to drive himself to therapy now - this is an improvement.    This is a 87 y.o. male referred to outpatient physical therapy and presents with a medical diagnosis of Chronic Lumbar stenosis with LLE radicular pain as well as chronic Right shoulder pain and demonstrates limitations as described in the problem list. Pt prognosis is Good. Pt will continue to benefit from skilled outpatient physical therapy to address the deficits listed in the problem list, provide pt/family education and to maximize pt's level of independence in the home and community environment.     LONG TERM GOALS:  Long Term Goals: 8 weeks  Pain: Decrease pain to no more than 4/10 to allow for improved ability to perform daily activities   Functional scale: Improve score on Oswestry to <45% impairment   Walking: Increase walking distance/duration to 6 mins without pain  Postures: Increase sitting and/or standing duration to 15 mins  without pain   Transfers: Perform sit <> Stand  transfers without increased pain or limitation  Exercise: demonstrate independence with home exercise program to maintain gains made in therapy.       Plan     Continue with established Plan of Care towards PT goals.    Therapist: Jonathan Favre, PTA  8/25/2020

## 2020-08-27 ENCOUNTER — CLINICAL SUPPORT (OUTPATIENT)
Dept: REHABILITATION | Facility: HOSPITAL | Age: 85
End: 2020-08-27
Payer: MEDICARE

## 2020-08-27 DIAGNOSIS — G89.29 CHRONIC RADICULAR PAIN OF LOWER BACK: Primary | ICD-10-CM

## 2020-08-27 DIAGNOSIS — M62.81 MUSCLE WEAKNESS (GENERALIZED): ICD-10-CM

## 2020-08-27 DIAGNOSIS — G89.29 CHRONIC RIGHT SHOULDER PAIN: ICD-10-CM

## 2020-08-27 DIAGNOSIS — M54.16 CHRONIC RADICULAR PAIN OF LOWER BACK: Primary | ICD-10-CM

## 2020-08-27 DIAGNOSIS — M25.511 CHRONIC RIGHT SHOULDER PAIN: ICD-10-CM

## 2020-08-27 PROCEDURE — 97110 THERAPEUTIC EXERCISES: CPT | Mod: PN,CQ

## 2020-08-27 NOTE — PROGRESS NOTES
Physical Therapy Daily Note     Name: Jeffrey Ocasio  [facility] Number: [id number]  Diagnosis:   Encounter Diagnoses   Name Primary?    Chronic radicular pain of lower back Yes    Muscle weakness (generalized)     Chronic right shoulder pain      Physician: Cinthia Shrestha MD  Precautions: Standard  Visit #: 18 of 18  PTA Visit #: 2  Time In:  3:30 PM  Time Out:  4:30 PM    Subjective     Pt reports:  I am hurting today.   Pain Scale: Jeffrey rates pain on a scale of 0-10 to be 8 in right shld and 2 in low back currently.    Objective     Jeffrey received individual therapeutic exercises to develop strength, endurance, flexibility, posture and core stabilization for 45 minutes including:  Nustep level 5 x 30 mins  Scapular Retraction x 30 with Black TB  Standing Wall Slides x 15  Pelvic Tilts x 20  Bridges x 15   Ball Squeezes x 3 mins  Bent Knee Fallouts x 15   DKC x 15 with SB  Lumbar Rolls x 15 with SB  Seated Lumbar Flexion with SB x 4 mins    DNP  Jeffrey received the following manual therapy techniques:  were applied to the: Lumbar spine for 12  minutes including: S/L flexion to right/left lumbar spine - all levels; dynamic hamstring stretch BLE's;; lumbar/hip stretch into rotation on each side.        The patient received the following direct contact modalities after being cleared for contraindications:     The patient received the following supervised modalities after being cleared for contradictions:    Written Home Exercises Provided:   Pt demo good understanding of the education provided. Jeffrey demonstrated good return demonstration of activities.     Education provided re:  Jeffrey verbalized good understanding of education provided.   No spiritual or educational barriers to learning provided    Assessment     Patient tolerated exercises fairly well; Finally making some improvement in his ankle swelling with change in meds and elevation of legs above his  falguni Murphy continues to demonstrate antalgic gait pattern with use of RW; increased forward trunk flexion; flexed hips/knees; He is able to drive himself to therapy now - this is an improvement.    This is a 87 y.o. male referred to outpatient physical therapy and presents with a medical diagnosis of Chronic Lumbar stenosis with LLE radicular pain as well as chronic Right shoulder pain and demonstrates limitations as described in the problem list. Pt prognosis is Good. Pt will continue to benefit from skilled outpatient physical therapy to address the deficits listed in the problem list, provide pt/family education and to maximize pt's level of independence in the home and community environment.     LONG TERM GOALS:  Long Term Goals: 8 weeks  Pain: Decrease pain to no more than 4/10 to allow for improved ability to perform daily activities   Functional scale: Improve score on Oswestry to <45% impairment   Walking: Increase walking distance/duration to 6 mins without pain  Postures: Increase sitting and/or standing duration to 15 mins  without pain   Transfers: Perform sit <> Stand  transfers without increased pain or limitation  Exercise: demonstrate independence with home exercise program to maintain gains made in therapy.       Plan     Recommend D/C from skilled PT at this time.     Therapist: Jonathan Favre, PTA  8/27/2020

## 2020-09-02 ENCOUNTER — OFFICE VISIT (OUTPATIENT)
Dept: CARDIOLOGY | Facility: CLINIC | Age: 85
End: 2020-09-02
Payer: MEDICARE

## 2020-09-02 VITALS
SYSTOLIC BLOOD PRESSURE: 122 MMHG | BODY MASS INDEX: 29.13 KG/M2 | DIASTOLIC BLOOD PRESSURE: 60 MMHG | HEART RATE: 66 BPM | OXYGEN SATURATION: 94 % | HEIGHT: 74 IN | WEIGHT: 227 LBS

## 2020-09-02 DIAGNOSIS — I82.5Z9 CHRONIC DEEP VEIN THROMBOSIS (DVT) OF DISTAL VEIN OF LOWER EXTREMITY, UNSPECIFIED LATERALITY: Primary | ICD-10-CM

## 2020-09-02 DIAGNOSIS — R26.81 GAIT INSTABILITY: ICD-10-CM

## 2020-09-02 DIAGNOSIS — G89.29 CHRONIC RADICULAR PAIN OF LOWER BACK: ICD-10-CM

## 2020-09-02 DIAGNOSIS — M54.16 CHRONIC RADICULAR PAIN OF LOWER BACK: ICD-10-CM

## 2020-09-02 PROCEDURE — 99213 OFFICE O/P EST LOW 20 MIN: CPT | Mod: S$GLB,,, | Performed by: INTERNAL MEDICINE

## 2020-09-02 PROCEDURE — 99213 PR OFFICE/OUTPT VISIT, EST, LEVL III, 20-29 MIN: ICD-10-PCS | Mod: S$GLB,,, | Performed by: INTERNAL MEDICINE

## 2020-09-02 RX ORDER — CALCIUM CARBONATE 260MG(650)
1 TABLET,CHEWABLE ORAL ONCE
COMMUNITY

## 2020-09-02 NOTE — ASSESSMENT & PLAN NOTE
His pain is much more stabilized.  Intermittently does have some issues with it stent and patient is on multiple medications for the same.

## 2020-09-02 NOTE — ASSESSMENT & PLAN NOTE
Patient is currently off of Xarelto because he ran out of it and cannot afford it.  And he is currently on low-dose aspirin 81 mg p.o. q.day.  Patient had lower extremity ultrasound.  Unfortunately unable to access the results due to mild function of the computer system.  Discussed with patient that we will axis it as soon as possible and notify him with the telephone.

## 2020-10-09 NOTE — TELEPHONE ENCOUNTER
Advised pt wife of culture results and rx for ABT sent to pharmacy. She states pt picked up rx yesterday if he has any questions to call the office   Was The Patient On Physician Recommended Anticoagulation Therapy?: Please Select the Appropriate Response

## 2020-11-09 ENCOUNTER — OFFICE VISIT (OUTPATIENT)
Dept: PODIATRY | Facility: CLINIC | Age: 85
End: 2020-11-09
Payer: MEDICARE

## 2020-11-09 VITALS
TEMPERATURE: 97 F | BODY MASS INDEX: 29.26 KG/M2 | WEIGHT: 228 LBS | SYSTOLIC BLOOD PRESSURE: 140 MMHG | DIASTOLIC BLOOD PRESSURE: 81 MMHG | HEIGHT: 74 IN | HEART RATE: 70 BPM

## 2020-11-09 DIAGNOSIS — G60.9 IDIOPATHIC PERIPHERAL NEUROPATHY: ICD-10-CM

## 2020-11-09 DIAGNOSIS — L03.031 PARONYCHIA OF GREAT TOE, RIGHT: ICD-10-CM

## 2020-11-09 DIAGNOSIS — L60.0 INGROWN NAIL: Primary | ICD-10-CM

## 2020-11-09 DIAGNOSIS — B35.3 TINEA PEDIS OF BOTH FEET: ICD-10-CM

## 2020-11-09 PROCEDURE — 99999 PR PBB SHADOW E&M-EST. PATIENT-LVL III: ICD-10-PCS | Mod: PBBFAC,,, | Performed by: PODIATRIST

## 2020-11-09 PROCEDURE — 99213 OFFICE O/P EST LOW 20 MIN: CPT | Mod: PBBFAC | Performed by: PODIATRIST

## 2020-11-09 PROCEDURE — 99213 OFFICE O/P EST LOW 20 MIN: CPT | Mod: S$PBB,,, | Performed by: PODIATRIST

## 2020-11-09 PROCEDURE — 99213 PR OFFICE/OUTPT VISIT, EST, LEVL III, 20-29 MIN: ICD-10-PCS | Mod: S$PBB,,, | Performed by: PODIATRIST

## 2020-11-09 PROCEDURE — 99999 PR PBB SHADOW E&M-EST. PATIENT-LVL III: CPT | Mod: PBBFAC,,, | Performed by: PODIATRIST

## 2020-11-09 RX ORDER — DICLOFENAC POTASSIUM 50 MG/1
TABLET, FILM COATED ORAL
COMMUNITY
Start: 2020-10-14 | End: 2021-06-27 | Stop reason: SDUPTHER

## 2020-11-09 NOTE — LETTER
November 10, 2020      Cinthia Shrestha MD  [address]  [place]  [facility] [place] [address]           [facility]  [address] [facility] [place] [address]  Phone: [phone]  Fax: [phone]          Patient: Jeffrey Ocasio   MR Number: [id number]   YOB: 1933   Date of Visit: 11/9/2020       Dear Dr. Cinthia Shrestha:    Thank you for referring Jeffrey Ocasio to me for evaluation. Attached you will find relevant portions of my assessment and plan of care.    If you have questions, please do not hesitate to call me. I look forward to following Jeffrey Ocasio along with you.    Sincerely,    David Reed, RG    Enclosure  CC:  No Recipients    If you would like to receive this communication electronically, please contact [email or web address] or [phone] to request more information on [facility] access.    For providers and/or their staff who would like to refer a patient to [facility], please contact us through our one-stop-shop provider referral line, [facility], at [phone].    If you feel you have received this communication in error or would no longer like to receive these types of communications, please e-mail [email or web address]

## 2020-11-10 NOTE — PROGRESS NOTES
Subjective:       Patient ID: Jeffrey Ocasio is a 87 y.o. male.    Chief Complaint: Foot Pain, Foot Problem, and Ingrown Toenail  Patient presents today he is concerned about ingrowing toenails on his big toes.  Patient states his left great toenail has been at especially bothersome.  Patient is currently seeing a dermatologist for a rash on the top of his left foot.  Follow-up  Associated symptoms include arthralgias and numbness.   Nail Problem  Associated symptoms include arthralgias and numbness.   Ingrown Toenail  Associated symptoms include arthralgias and numbness.   Foot Pain  Associated symptoms include arthralgias and numbness.     Review of Systems   Musculoskeletal: Positive for arthralgias, back pain and gait problem.   Neurological: Positive for numbness.   All other systems reviewed and are negative.      Objective:      Physical Exam  Vitals signs and nursing note reviewed.   Constitutional:       Appearance: He is well-developed.   Cardiovascular:      Pulses:           Dorsalis pedis pulses are 1+ on the right side and 1+ on the left side.        Posterior tibial pulses are 1+ on the right side and 1+ on the left side.   Pulmonary:      Effort: Pulmonary effort is normal.   Musculoskeletal:         General: Deformity present.      Right foot: Deformity present.      Left foot: Deformity present.   Feet:      Right foot:      Protective Sensation: 4 sites tested. 1 site sensed.     Left foot:      Protective Sensation: 4 sites tested. 1 site sensed.  Skin:     General: Skin is warm.      Capillary Refill: Capillary refill takes more than 3 seconds.      Findings: Erythema present.   Neurological:      Deep Tendon Reflexes: Reflexes abnormal.   Psychiatric:         Behavior: Behavior normal.         Thought Content: Thought content normal.         Judgment: Judgment normal.       On evaluation patient has areas of previously noted ingrown infected toenail on both big toes there is signs of fungal  involvement some of the nail is growing into both the medial and lateral border which raises concern for infection there is some mild discomfort noted upon palpation. Patient does have history of currently displayed signs of interdigital maceration with signs of interdigital fungal involvement. Patient displays findings consistent with previously noted neuropathy bilateral.                        Assessment:       1. Ingrown nail    2. Tinea pedis of both feet    3. Idiopathic peripheral neuropathy    4. Paronychia of great toe, right        Plan:       Patient presents today he is concerned about ingrowing toenails on his big toes.  Patient states his left great toenail has been at especially bothersome.  Patient is currently seeing a dermatologist for a rash on the top of his left foot.  Patient is now using a walker he states he has become very unstable he has had several falls but is not actually injured himself.  Patient has what appears to be fungal involvement on the dorsal aspect the left foot 1st webspace he states he has been applying a prescription cream that the pharmacist gave him I have recommended that he monitor this area closely patient did have ingrowing toenail lateral border left hallux with early signs of infection I was able to aggressively trim and remove the nail which gave the patient significant relief.  Bacitracin ointment a light dressing was applied to the area patient advised this is not pain-free in 4-5 days he is to contact us immediately for further treatment patient may need a nail avulsion at this continues to be an issue.  Total face-to-face time equaled 20 min.    Patient continues to suffer from sciatic and back problems.  This note was created using [employer] voice recognition software that occasionally misinterpreted phrases or words.

## 2020-12-07 NOTE — PROGRESS NOTES
Outpatient Physical Therapy Ortho Treatment Note  [facility]     Patient Name: Cassia Ochoa  : 1968  MRN: [id number]  Today's Date: 2020      Visit Date: 2020    Visit Dx:    ICD-10-CM ICD-9-CM   1. Chronic right-sided low back pain with right-sided sciatica  M54.41 724.2    G89.29 724.3     338.29   2. Chronic right-sided thoracic back pain  M54.6 724.1    G89.29 338.29       There is no problem list on file for this patient.       Past Medical History:   Diagnosis Date   • Bilateral ovarian cysts    • Hashimoto's thyroiditis    • Hypothyroidism    • Urinary tract infection         Past Surgical History:   Procedure Laterality Date   • APPENDECTOMY     • HYSTERECTOMY                         PT Assessment/Plan     Row Name 20          PT Assessment    Assessment Comments  Pt tolerated review of HEP well with minor cueing needed for form/technique. Modified isometric ABD to include BKFOs with red band. Responded well to first trial of STM w/ tools.  -AC        PT Plan    PT Plan Comments  Cont per POC progressing hip/core strengthening as tolerated.  -AC       User Key  (r) = Recorded By, (t) = Taken By, (c) = Cosigned By    Initials Name Provider Type    AC Miriam Gutierres, PT Physical Therapist            OP Exercises     Row Name 20             Subjective Comments    Subjective Comments  Pt states she has constant pain and sciatica. Reports weakness in R hip with goals of regaining symmetry.  -AC         Subjective Pain    Able to rate subjective pain?  yes  -AC      Pre-Treatment Pain Level  2  -AC      Post-Treatment Pain Level  1  -AC         Total Minutes    78486 - PT Therapeutic Exercise Minutes  30  -AC      21067 - PT Manual Therapy Minutes  10  -AC         Exercise 1    Exercise Name 1  NuStep  -AC      Time 1  5 min  -AC      Additional Comments  L4  -AC         Exercise 2    Exercise Name 2  Reviewed HEP including: SKTC, isometrics 4 ways, BKFO with  Subjective:    Patient ID:  Jeffrey Ocasio is a 87 y.o. male who presents for follow-up evaluation of Deep Vein Thrombosis and Results (venous u/s of legs)      HPI:  Patient has been doing well.  At the present time patient is currently on aspirin and he is not been on Xarelto.  Does not want to go back on Xarelto at the present time.  Patient denies any shortness of breath or difficulty in breathing.  Denies any chest pain or tightness or heaviness.  Denies any dizziness or lightheadedness.  He does have occasional tingling around the right side of the year.  Especially it happens when he looks to the right side and resolves when he turns his head to the left side.    Review of patient's allergies indicates:   Allergen Reactions    Grass pollen        Past Medical History:   Diagnosis Date    Allergy     Asthma     DVT (deep venous thrombosis)     Lymphedema of right lower extremity      Past Surgical History:   Procedure Laterality Date    KNEE SURGERY      7 left knee surgery      Social History     Tobacco Use    Smoking status: Never Smoker    Smokeless tobacco: Never Used   Substance Use Topics    Alcohol use: No     Frequency: Never    Drug use: No        Review of Systems:     Review of Systems   Constitution: Negative for diaphoresis and fever.   HENT: Negative for nosebleeds.    Eyes: Negative for blurred vision.   Cardiovascular: Negative for chest pain, dyspnea on exertion, irregular heartbeat, leg swelling, orthopnea, palpitations and paroxysmal nocturnal dyspnea.   Respiratory: Negative for cough, shortness of breath and wheezing.    Hematologic/Lymphatic: Negative for bleeding problem. Does not bruise/bleed easily.   Skin: Negative for color change and rash.   Musculoskeletal: Negative for falls and myalgias.   Gastrointestinal: Negative for heartburn, hematemesis and hematochezia.   Genitourinary: Negative for dysuria, frequency and hematuria.   Neurological: Negative for excessive  daytime sleepiness, dizziness and light-headedness.   Psychiatric/Behavioral: Negative for depression and memory loss.            Objective:        Vitals:    09/02/20 1302   BP: 122/60   Pulse: 66       No results found for: WBC, HGB, HCT, PLT, CHOL, TRIG, HDL, LDLDIRECT, ALT, AST, NA, K, CL, CREATININE, BUN, CO2, TSH, PSA, INR, GLUF, HGBA1C, MICROALBUR     ECHOCARDIOGRAM RESULTS  No results found for this or any previous visit.      CURRENT/PREVIOUS VISIT EKG  No results found for this or any previous visit.  No procedure found.   No results found for this or any previous visit.    Physical Exam:    Physical Exam   Constitutional: He is oriented to person, place, and time. He appears well-developed and well-nourished.   HENT:   Head: Normocephalic and atraumatic.   Eyes: Pupils are equal, round, and reactive to light. EOM are normal.   Neck: Normal range of motion. Neck supple. No JVD present.   Cardiovascular: Normal rate, regular rhythm and normal heart sounds. Exam reveals no gallop and no friction rub.   No murmur heard.  Pulmonary/Chest: Effort normal and breath sounds normal. No respiratory distress. He has no rales. He exhibits no tenderness.   Abdominal: Soft. Bowel sounds are normal. He exhibits no distension. There is no abdominal tenderness.   Musculoskeletal: Normal range of motion.         General: Edema present.      Comments: Mild ankle swelling bilaterally.   Neurological: He is alert and oriented to person, place, and time.   Skin: Skin is warm and dry. No rash noted.   Psychiatric: He has a normal mood and affect. His behavior is normal.          Medication List with Changes/Refills   Current Medications    ASPIRIN (ECOTRIN) 81 MG EC TABLET    Take 81 mg by mouth once daily.    BIOTIN 5 MG CAP    Take by mouth.    CHOLECALCIFEROL, VITAMIN D3, 5,000 UNIT TAB    Take by mouth.    CYCLOBENZAPRINE (FLEXERIL) 5 MG TABLET    TAKE 1 TABLET BY MOUTH THREE TIMES A DAY AS NEEDED FOR MUSCLE SPASM     red band, PPTs, and bridges.  -AC      Reps 2  10 ea  -AC      Time 2  25 mins  -AC        User Key  (r) = Recorded By, (t) = Taken By, (c) = Cosigned By    Initials Name Provider Type    Miriam Alvarado, PT Physical Therapist                      Manual Rx (last 36 hours)      Manual Treatments     Row Name 12/07/20 0730             Total Minutes    03889 - PT Manual Therapy Minutes  10  -AC         Manual Rx 1    Manual Rx 1 Location  Lumbar/upper gluteal region  -AC      Manual Rx 1 Type  STM using tools using light pressure with pt in prone  -AC      Manual Rx 1 Duration  10  -AC        User Key  (r) = Recorded By, (t) = Taken By, (c) = Cosigned By    Initials Name Provider Type    Miriam Alvarado PT Physical Therapist                             Time Calculation:   Start Time: 0730  Therapy Charges for Today     Code Description Service Date Service Provider Modifiers Qty    [id number]  PT THER PROC EA 15 MIN 12/7/2020 Miriam Gutierres, PT GP 2    [id number]  PT MANUAL THERAPY EA 15 MIN 12/7/2020 Miriam Gutierres, PT GP 1                    Miriam Gutierres PT  12/7/2020      DOXYCYCLINE (VIBRAMYCIN) 100 MG CAP        ESCITALOPRAM OXALATE (LEXAPRO) 10 MG TABLET    Take 10 mg by mouth once daily.    FINASTERIDE (PROSCAR) 5 MG TABLET    Take 5 mg by mouth once daily.    FISH OIL-OMEGA-3 FATTY ACIDS 300-1,000 MG CAPSULE    Take 2 capsules by mouth 2 (two) times daily.    FUROSEMIDE (LASIX) 20 MG TABLET    40 mg.     GABAPENTIN (NEURONTIN) 300 MG CAPSULE    Take 1 capsule by mouth.    GLUCOSAMINE-CHONDROITIN 500-400 MG TABLET    Take 1 tablet by mouth 3 (three) times daily.    KLOR-CON M20 20 MEQ TABLET    TAKE 1 TABLET BY MOUTH 3 TIMES A DAY FOR 1 WEEK THEN USE 1 TABLET DAILY AS NEEDED WITH THE LASIX    [id number]-ALGAL OIL (METANX, ALGAL OIL,) 3 MG-35 MG-2 MG -90.314 MG CAP    Take by mouth.    MAGNESIUM CITRATE 100 MG TAB    Take 1 tablet by mouth once.    MECLIZINE (ANTIVERT) 25 MG TABLET    Take 25 mg by mouth 3 (three) times daily as needed.    MELATONIN 1 MG TAB    Take by mouth nightly as needed.    METHOCARBAMOL (ROBAXIN) 750 MG TAB        MULTIVITAMIN (ONE DAILY MULTIVITAMIN) PER TABLET    Take 1 tablet by mouth once daily.    NAPROXEN (NAPROSYN) 500 MG TABLET    TAKE 1 TABLET BY MOUTH TWICE A DAY FOR 10 DAYS AS NEEDED FOR PAIN WITH FOOD    NITROGLYCERIN (NITROSTAT) 0.4 MG SL TABLET    PLACE 1 TABLET UNDER TONGUE EVERY 5 MINS, UP TO 3 DOSES IN 15 MINS AS NEEDED FOR CHEST PAIN, [id number]    OMEPRAZOLE (PRILOSEC) 20 MG CAPSULE    Take 20 mg by mouth once daily.    PRIMIDONE (MYSOLINE) 50 MG TAB    Take 100 mg by mouth 3 (three) times daily.    ROSUVASTATIN (CRESTOR) 5 MG TABLET    Take 5 mg by mouth once daily.    SYNTHROID 25 MCG TABLET        TAMSULOSIN (FLOMAX) 0.4 MG CAP    Take 1 capsule by mouth once daily.    TURMERIC ORAL    Take by mouth once daily.    XARELTO 20 MG TAB                 Assessment:       1. Chronic deep vein thrombosis (DVT) of distal vein of lower extremity, unspecified laterality    2. Gait instability    3. Chronic radicular pain of lower back          Plan:     Problem List Items Addressed This Visit        Neuro    Chronic radicular pain of lower back    Current Assessment & Plan     His pain is much more stabilized.  Intermittently does have some issues with it stent and patient is on multiple medications for the same.            Hematology    Chronic deep vein thrombosis (DVT) of distal vein of lower extremity - Primary    Current Assessment & Plan     Patient is currently off of Xarelto because he ran out of it and cannot afford it.  And he is currently on low-dose aspirin 81 mg p.o. q.day.  Patient had lower extremity ultrasound.  Unfortunately unable to access the results due to mild function of the computer system.  Discussed with patient that we will axis it as soon as possible and notify him with the telephone.            Other    Gait instability          No follow-ups on file.

## 2021-01-15 ENCOUNTER — IMMUNIZATION (OUTPATIENT)
Dept: FAMILY MEDICINE | Facility: CLINIC | Age: 86
End: 2021-01-15
Payer: MEDICARE

## 2021-01-15 DIAGNOSIS — Z23 NEED FOR VACCINATION: Primary | ICD-10-CM

## 2021-01-15 PROCEDURE — 91301 COVID-19, MRNA, LNP-S, PF, 100 MCG/0.5 ML DOSE VACCINE: ICD-10-PCS | Mod: ,,, | Performed by: FAMILY MEDICINE

## 2021-01-15 PROCEDURE — 91301 COVID-19, MRNA, LNP-S, PF, 100 MCG/0.5 ML DOSE VACCINE: CPT | Mod: ,,, | Performed by: FAMILY MEDICINE

## 2021-01-15 PROCEDURE — 0011A COVID-19, MRNA, LNP-S, PF, 100 MCG/0.5 ML DOSE VACCINE: ICD-10-PCS | Mod: ,,, | Performed by: FAMILY MEDICINE

## 2021-01-15 PROCEDURE — 0011A COVID-19, MRNA, LNP-S, PF, 100 MCG/0.5 ML DOSE VACCINE: CPT | Mod: ,,, | Performed by: FAMILY MEDICINE

## 2021-01-21 ENCOUNTER — TELEPHONE (OUTPATIENT)
Dept: PODIATRY | Facility: CLINIC | Age: 86
End: 2021-01-21

## 2021-01-28 ENCOUNTER — OFFICE VISIT (OUTPATIENT)
Dept: PODIATRY | Facility: CLINIC | Age: 86
End: 2021-01-28
Payer: MEDICARE

## 2021-01-28 VITALS
WEIGHT: 228 LBS | BODY MASS INDEX: 29.26 KG/M2 | DIASTOLIC BLOOD PRESSURE: 73 MMHG | TEMPERATURE: 98 F | SYSTOLIC BLOOD PRESSURE: 127 MMHG | HEART RATE: 77 BPM | HEIGHT: 74 IN

## 2021-01-28 DIAGNOSIS — B35.3 TINEA PEDIS OF BOTH FEET: ICD-10-CM

## 2021-01-28 DIAGNOSIS — L03.031 PARONYCHIA OF GREAT TOE, RIGHT: ICD-10-CM

## 2021-01-28 DIAGNOSIS — G60.9 IDIOPATHIC PERIPHERAL NEUROPATHY: Primary | ICD-10-CM

## 2021-01-28 PROCEDURE — 99214 OFFICE O/P EST MOD 30 MIN: CPT | Mod: PBBFAC,PN | Performed by: PODIATRIST

## 2021-01-28 PROCEDURE — 99999 PR PBB SHADOW E&M-EST. PATIENT-LVL IV: ICD-10-PCS | Mod: PBBFAC,,, | Performed by: PODIATRIST

## 2021-01-28 PROCEDURE — 99213 PR OFFICE/OUTPT VISIT, EST, LEVL III, 20-29 MIN: ICD-10-PCS | Mod: S$PBB,,, | Performed by: PODIATRIST

## 2021-01-28 PROCEDURE — 99213 OFFICE O/P EST LOW 20 MIN: CPT | Mod: S$PBB,,, | Performed by: PODIATRIST

## 2021-01-28 PROCEDURE — 99999 PR PBB SHADOW E&M-EST. PATIENT-LVL IV: CPT | Mod: PBBFAC,,, | Performed by: PODIATRIST

## 2021-01-28 RX ORDER — OMEPRAZOLE 40 MG/1
CAPSULE, DELAYED RELEASE ORAL
COMMUNITY
Start: 2020-11-27 | End: 2021-06-27 | Stop reason: SDUPTHER

## 2021-01-28 RX ORDER — NAPROXEN 375 MG/1
375 TABLET ORAL 2 TIMES DAILY WITH MEALS
COMMUNITY
Start: 2021-01-12

## 2021-01-28 RX ORDER — FUROSEMIDE 40 MG/1
40 TABLET ORAL DAILY
COMMUNITY
Start: 2021-01-12 | End: 2021-03-26 | Stop reason: SDUPTHER

## 2021-01-29 ENCOUNTER — CLINICAL SUPPORT (OUTPATIENT)
Dept: REHABILITATION | Facility: HOSPITAL | Age: 86
End: 2021-01-29
Payer: MEDICARE

## 2021-01-29 DIAGNOSIS — M79.601 RIGHT UPPER LIMB PAIN: ICD-10-CM

## 2021-01-29 DIAGNOSIS — M25.511 SHOULDER PAIN, RIGHT: ICD-10-CM

## 2021-01-29 DIAGNOSIS — M25.511 SHOULDER PAIN, RIGHT: Primary | ICD-10-CM

## 2021-01-29 DIAGNOSIS — M79.601 RIGHT UPPER LIMB PAIN: Primary | ICD-10-CM

## 2021-01-29 DIAGNOSIS — M25.511 ACUTE PAIN OF RIGHT SHOULDER: ICD-10-CM

## 2021-01-29 PROCEDURE — 97035 APP MDLTY 1+ULTRASOUND EA 15: CPT | Mod: PN

## 2021-01-29 PROCEDURE — 97162 PT EVAL MOD COMPLEX 30 MIN: CPT | Mod: PN

## 2021-02-01 ENCOUNTER — CLINICAL SUPPORT (OUTPATIENT)
Dept: REHABILITATION | Facility: HOSPITAL | Age: 86
End: 2021-02-01
Payer: MEDICARE

## 2021-02-01 DIAGNOSIS — M62.81 MUSCLE WEAKNESS (GENERALIZED): Primary | ICD-10-CM

## 2021-02-01 DIAGNOSIS — M25.511 RIGHT SHOULDER PAIN, UNSPECIFIED CHRONICITY: ICD-10-CM

## 2021-02-01 PROCEDURE — 97110 THERAPEUTIC EXERCISES: CPT | Mod: PN,CQ

## 2021-02-01 PROCEDURE — 97140 MANUAL THERAPY 1/> REGIONS: CPT | Mod: PN,CQ

## 2021-02-02 ENCOUNTER — CLINICAL SUPPORT (OUTPATIENT)
Dept: REHABILITATION | Facility: HOSPITAL | Age: 86
End: 2021-02-02
Payer: MEDICARE

## 2021-02-02 DIAGNOSIS — M62.81 MUSCLE WEAKNESS (GENERALIZED): ICD-10-CM

## 2021-02-02 DIAGNOSIS — M25.511 CHRONIC RIGHT SHOULDER PAIN: Primary | ICD-10-CM

## 2021-02-02 DIAGNOSIS — G89.29 CHRONIC RIGHT SHOULDER PAIN: Primary | ICD-10-CM

## 2021-02-02 PROCEDURE — 97110 THERAPEUTIC EXERCISES: CPT | Mod: PN,CQ

## 2021-02-02 PROCEDURE — 97140 MANUAL THERAPY 1/> REGIONS: CPT | Mod: PN,CQ

## 2021-02-09 ENCOUNTER — CLINICAL SUPPORT (OUTPATIENT)
Dept: REHABILITATION | Facility: HOSPITAL | Age: 86
End: 2021-02-09
Payer: MEDICARE

## 2021-02-09 DIAGNOSIS — M25.511 CHRONIC RIGHT SHOULDER PAIN: Primary | ICD-10-CM

## 2021-02-09 DIAGNOSIS — M62.81 MUSCLE WEAKNESS (GENERALIZED): ICD-10-CM

## 2021-02-09 DIAGNOSIS — G89.29 CHRONIC RIGHT SHOULDER PAIN: Primary | ICD-10-CM

## 2021-02-09 PROCEDURE — 97110 THERAPEUTIC EXERCISES: CPT | Mod: PN,CQ

## 2021-02-11 ENCOUNTER — CLINICAL SUPPORT (OUTPATIENT)
Dept: REHABILITATION | Facility: HOSPITAL | Age: 86
End: 2021-02-11
Payer: MEDICARE

## 2021-02-11 DIAGNOSIS — M62.81 MUSCLE WEAKNESS (GENERALIZED): Primary | ICD-10-CM

## 2021-02-11 DIAGNOSIS — M25.511 RIGHT SHOULDER PAIN, UNSPECIFIED CHRONICITY: ICD-10-CM

## 2021-02-11 PROCEDURE — 97110 THERAPEUTIC EXERCISES: CPT | Mod: PN,CQ

## 2021-02-11 PROCEDURE — 97140 MANUAL THERAPY 1/> REGIONS: CPT | Mod: PN,CQ

## 2021-02-12 ENCOUNTER — IMMUNIZATION (OUTPATIENT)
Dept: FAMILY MEDICINE | Facility: CLINIC | Age: 86
End: 2021-02-12
Payer: MEDICARE

## 2021-02-12 DIAGNOSIS — Z23 NEED FOR VACCINATION: Primary | ICD-10-CM

## 2021-02-12 PROCEDURE — 91301 COVID-19, MRNA, LNP-S, PF, 100 MCG/0.5 ML DOSE VACCINE: CPT | Mod: S$GLB,,, | Performed by: FAMILY MEDICINE

## 2021-02-12 PROCEDURE — 91301 COVID-19, MRNA, LNP-S, PF, 100 MCG/0.5 ML DOSE VACCINE: ICD-10-PCS | Mod: S$GLB,,, | Performed by: FAMILY MEDICINE

## 2021-02-12 PROCEDURE — 0012A COVID-19, MRNA, LNP-S, PF, 100 MCG/0.5 ML DOSE VACCINE: ICD-10-PCS | Mod: CV19,S$GLB,, | Performed by: FAMILY MEDICINE

## 2021-02-12 PROCEDURE — 0012A COVID-19, MRNA, LNP-S, PF, 100 MCG/0.5 ML DOSE VACCINE: CPT | Mod: CV19,S$GLB,, | Performed by: FAMILY MEDICINE

## 2021-02-18 ENCOUNTER — CLINICAL SUPPORT (OUTPATIENT)
Dept: REHABILITATION | Facility: HOSPITAL | Age: 86
End: 2021-02-18
Payer: MEDICARE

## 2021-02-18 DIAGNOSIS — G89.29 CHRONIC RIGHT SHOULDER PAIN: Primary | ICD-10-CM

## 2021-02-18 DIAGNOSIS — M25.511 CHRONIC RIGHT SHOULDER PAIN: Primary | ICD-10-CM

## 2021-02-18 DIAGNOSIS — M62.81 MUSCLE WEAKNESS (GENERALIZED): ICD-10-CM

## 2021-02-18 PROCEDURE — 97140 MANUAL THERAPY 1/> REGIONS: CPT | Mod: PN,CQ

## 2021-02-18 PROCEDURE — 97110 THERAPEUTIC EXERCISES: CPT | Mod: PN,CQ

## 2021-02-22 ENCOUNTER — CLINICAL SUPPORT (OUTPATIENT)
Dept: REHABILITATION | Facility: HOSPITAL | Age: 86
End: 2021-02-22
Payer: MEDICARE

## 2021-02-22 DIAGNOSIS — G89.29 CHRONIC RIGHT SHOULDER PAIN: Primary | ICD-10-CM

## 2021-02-22 DIAGNOSIS — M25.511 CHRONIC RIGHT SHOULDER PAIN: Primary | ICD-10-CM

## 2021-02-22 DIAGNOSIS — M62.81 MUSCLE WEAKNESS (GENERALIZED): ICD-10-CM

## 2021-02-22 PROCEDURE — 97110 THERAPEUTIC EXERCISES: CPT | Mod: PN,CQ

## 2021-02-22 PROCEDURE — 97140 MANUAL THERAPY 1/> REGIONS: CPT | Mod: PN,CQ

## 2021-02-25 ENCOUNTER — CLINICAL SUPPORT (OUTPATIENT)
Dept: REHABILITATION | Facility: HOSPITAL | Age: 86
End: 2021-02-25
Payer: MEDICARE

## 2021-02-25 DIAGNOSIS — M62.81 MUSCLE WEAKNESS (GENERALIZED): ICD-10-CM

## 2021-02-25 DIAGNOSIS — G89.29 CHRONIC RIGHT SHOULDER PAIN: Primary | ICD-10-CM

## 2021-02-25 DIAGNOSIS — M25.511 CHRONIC RIGHT SHOULDER PAIN: Primary | ICD-10-CM

## 2021-02-25 PROCEDURE — 97110 THERAPEUTIC EXERCISES: CPT | Mod: PN,CQ

## 2021-03-01 ENCOUNTER — CLINICAL SUPPORT (OUTPATIENT)
Dept: REHABILITATION | Facility: HOSPITAL | Age: 86
End: 2021-03-01
Payer: MEDICARE

## 2021-03-01 DIAGNOSIS — G89.29 CHRONIC RIGHT SHOULDER PAIN: Primary | ICD-10-CM

## 2021-03-01 DIAGNOSIS — M25.511 CHRONIC RIGHT SHOULDER PAIN: Primary | ICD-10-CM

## 2021-03-01 DIAGNOSIS — M62.81 MUSCLE WEAKNESS (GENERALIZED): ICD-10-CM

## 2021-03-01 PROCEDURE — 97035 APP MDLTY 1+ULTRASOUND EA 15: CPT | Mod: PN,CQ

## 2021-03-01 PROCEDURE — 97110 THERAPEUTIC EXERCISES: CPT | Mod: PN,CQ

## 2021-03-01 PROCEDURE — 97140 MANUAL THERAPY 1/> REGIONS: CPT | Mod: PN,CQ

## 2021-03-04 ENCOUNTER — CLINICAL SUPPORT (OUTPATIENT)
Dept: REHABILITATION | Facility: HOSPITAL | Age: 86
End: 2021-03-04
Payer: MEDICARE

## 2021-03-04 DIAGNOSIS — G89.29 CHRONIC RIGHT SHOULDER PAIN: Primary | ICD-10-CM

## 2021-03-04 DIAGNOSIS — M25.511 CHRONIC RIGHT SHOULDER PAIN: Primary | ICD-10-CM

## 2021-03-04 DIAGNOSIS — M62.81 MUSCLE WEAKNESS OF RIGHT UPPER EXTREMITY: ICD-10-CM

## 2021-03-04 PROCEDURE — 97110 THERAPEUTIC EXERCISES: CPT | Mod: PN,CQ

## 2021-03-05 ENCOUNTER — OFFICE VISIT (OUTPATIENT)
Dept: CARDIOLOGY | Facility: CLINIC | Age: 86
End: 2021-03-05
Payer: MEDICARE

## 2021-03-05 VITALS
OXYGEN SATURATION: 98 % | RESPIRATION RATE: 16 BRPM | BODY MASS INDEX: 29.26 KG/M2 | HEIGHT: 74 IN | DIASTOLIC BLOOD PRESSURE: 74 MMHG | WEIGHT: 228 LBS | HEART RATE: 68 BPM | SYSTOLIC BLOOD PRESSURE: 138 MMHG

## 2021-03-05 DIAGNOSIS — R26.81 GAIT INSTABILITY: ICD-10-CM

## 2021-03-05 DIAGNOSIS — I25.119 ATHEROSCLEROSIS OF NATIVE CORONARY ARTERY OF NATIVE HEART WITH ANGINA PECTORIS: Primary | ICD-10-CM

## 2021-03-05 DIAGNOSIS — G60.9 IDIOPATHIC PERIPHERAL NEUROPATHY: ICD-10-CM

## 2021-03-05 DIAGNOSIS — G89.29 CHRONIC RIGHT SHOULDER PAIN: ICD-10-CM

## 2021-03-05 DIAGNOSIS — I82.5Z1 CHRONIC DEEP VEIN THROMBOSIS (DVT) OF DISTAL VEIN OF RIGHT LOWER EXTREMITY: ICD-10-CM

## 2021-03-05 DIAGNOSIS — M25.511 CHRONIC RIGHT SHOULDER PAIN: ICD-10-CM

## 2021-03-05 DIAGNOSIS — M62.81 MUSCLE WEAKNESS (GENERALIZED): ICD-10-CM

## 2021-03-05 PROCEDURE — 99214 PR OFFICE/OUTPT VISIT, EST, LEVL IV, 30-39 MIN: ICD-10-PCS | Mod: S$GLB,,, | Performed by: INTERNAL MEDICINE

## 2021-03-05 PROCEDURE — 99214 OFFICE O/P EST MOD 30 MIN: CPT | Mod: S$GLB,,, | Performed by: INTERNAL MEDICINE

## 2021-03-05 RX ORDER — SENNOSIDES 8.6 MG
30 CAPSULE ORAL 2 TIMES DAILY
COMMUNITY
Start: 2021-02-18

## 2021-03-05 RX ORDER — FLUTICASONE PROPIONATE 50 MCG
SPRAY, SUSPENSION (ML) NASAL
COMMUNITY
Start: 2021-03-02 | End: 2021-06-27 | Stop reason: SDUPTHER

## 2021-03-05 RX ORDER — ISOSORBIDE MONONITRATE 30 MG/1
30 TABLET, EXTENDED RELEASE ORAL DAILY
Qty: 30 TABLET | Refills: 11 | Status: SHIPPED | OUTPATIENT
Start: 2021-03-05 | End: 2022-03-05

## 2021-03-05 RX ORDER — TRIAMCINOLONE ACETONIDE 1 MG/G
OINTMENT TOPICAL
COMMUNITY
Start: 2021-02-01

## 2021-03-05 RX ORDER — CEFDINIR 300 MG/1
300 CAPSULE ORAL 2 TIMES DAILY
COMMUNITY
Start: 2021-02-18 | End: 2021-03-26

## 2021-03-08 ENCOUNTER — CLINICAL SUPPORT (OUTPATIENT)
Dept: REHABILITATION | Facility: HOSPITAL | Age: 86
End: 2021-03-08
Payer: MEDICARE

## 2021-03-08 DIAGNOSIS — M25.511 CHRONIC RIGHT SHOULDER PAIN: Primary | ICD-10-CM

## 2021-03-08 DIAGNOSIS — G89.29 CHRONIC RIGHT SHOULDER PAIN: Primary | ICD-10-CM

## 2021-03-08 PROCEDURE — 97110 THERAPEUTIC EXERCISES: CPT | Mod: PN,CQ

## 2021-03-12 ENCOUNTER — CLINICAL SUPPORT (OUTPATIENT)
Dept: REHABILITATION | Facility: HOSPITAL | Age: 86
End: 2021-03-12
Payer: MEDICARE

## 2021-03-12 DIAGNOSIS — G89.29 CHRONIC RIGHT SHOULDER PAIN: Primary | ICD-10-CM

## 2021-03-12 DIAGNOSIS — M62.81 MUSCLE WEAKNESS (GENERALIZED): ICD-10-CM

## 2021-03-12 DIAGNOSIS — M25.511 CHRONIC RIGHT SHOULDER PAIN: Primary | ICD-10-CM

## 2021-03-12 PROCEDURE — 97110 THERAPEUTIC EXERCISES: CPT | Mod: PN,CQ

## 2021-03-23 ENCOUNTER — TELEPHONE (OUTPATIENT)
Dept: PODIATRY | Facility: CLINIC | Age: 86
End: 2021-03-23

## 2021-03-23 ENCOUNTER — OFFICE VISIT (OUTPATIENT)
Dept: PODIATRY | Facility: CLINIC | Age: 86
End: 2021-03-23
Payer: MEDICARE

## 2021-03-23 VITALS
HEIGHT: 72 IN | DIASTOLIC BLOOD PRESSURE: 80 MMHG | WEIGHT: 226 LBS | SYSTOLIC BLOOD PRESSURE: 129 MMHG | TEMPERATURE: 98 F | BODY MASS INDEX: 30.61 KG/M2 | HEART RATE: 63 BPM

## 2021-03-23 DIAGNOSIS — B35.3 TINEA PEDIS OF BOTH FEET: ICD-10-CM

## 2021-03-23 DIAGNOSIS — L60.0 INGROWN NAIL: ICD-10-CM

## 2021-03-23 DIAGNOSIS — G60.9 IDIOPATHIC PERIPHERAL NEUROPATHY: Primary | ICD-10-CM

## 2021-03-23 DIAGNOSIS — R26.81 GAIT INSTABILITY: ICD-10-CM

## 2021-03-23 PROCEDURE — 99999 PR PBB SHADOW E&M-EST. PATIENT-LVL V: ICD-10-PCS | Mod: PBBFAC,,, | Performed by: PODIATRIST

## 2021-03-23 PROCEDURE — 99213 PR OFFICE/OUTPT VISIT, EST, LEVL III, 20-29 MIN: ICD-10-PCS | Mod: S$PBB,,, | Performed by: PODIATRIST

## 2021-03-23 PROCEDURE — 99213 OFFICE O/P EST LOW 20 MIN: CPT | Mod: S$PBB,,, | Performed by: PODIATRIST

## 2021-03-23 PROCEDURE — 99999 PR PBB SHADOW E&M-EST. PATIENT-LVL V: CPT | Mod: PBBFAC,,, | Performed by: PODIATRIST

## 2021-03-23 PROCEDURE — 99215 OFFICE O/P EST HI 40 MIN: CPT | Mod: PBBFAC,PN | Performed by: PODIATRIST

## 2021-03-23 RX ORDER — ASPIRIN 81 MG/1
81 TABLET ORAL
COMMUNITY
Start: 2021-03-11 | End: 2021-03-26 | Stop reason: SDUPTHER

## 2021-03-23 RX ORDER — OMEPRAZOLE 40 MG/1
40 CAPSULE, DELAYED RELEASE ORAL
COMMUNITY
Start: 2020-06-03 | End: 2021-03-26 | Stop reason: SDUPTHER

## 2021-03-23 RX ORDER — FUROSEMIDE 40 MG/1
40 TABLET ORAL
COMMUNITY
Start: 2021-01-28

## 2021-03-23 RX ORDER — METHOCARBAMOL 750 MG/1
750 TABLET, FILM COATED ORAL
COMMUNITY
Start: 2020-07-27

## 2021-03-23 RX ORDER — TAMSULOSIN HYDROCHLORIDE 0.4 MG/1
CAPSULE ORAL
COMMUNITY
Start: 2020-09-21 | End: 2021-09-16

## 2021-03-23 RX ORDER — ROSUVASTATIN CALCIUM 5 MG/1
TABLET, COATED ORAL
COMMUNITY
Start: 2020-07-29 | End: 2021-03-26 | Stop reason: SDUPTHER

## 2021-03-23 RX ORDER — CALCIUM CARBONATE 260MG(650)
TABLET,CHEWABLE ORAL
COMMUNITY
Start: 2021-03-11 | End: 2021-03-26 | Stop reason: SDUPTHER

## 2021-03-23 RX ORDER — PRIMIDONE 50 MG/1
TABLET ORAL
COMMUNITY
Start: 2020-06-22 | End: 2021-03-26 | Stop reason: SDUPTHER

## 2021-03-23 RX ORDER — LEVOTHYROXINE SODIUM 25 UG/1
TABLET ORAL
COMMUNITY
Start: 2020-06-29 | End: 2022-03-15

## 2021-03-23 RX ORDER — NAPROXEN 375 MG/1
375 TABLET ORAL
COMMUNITY
Start: 2020-10-27 | End: 2021-06-27 | Stop reason: SDUPTHER

## 2021-05-13 ENCOUNTER — OFFICE VISIT (OUTPATIENT)
Dept: PODIATRY | Facility: CLINIC | Age: 86
End: 2021-05-13
Payer: MEDICARE

## 2021-05-13 VITALS
HEIGHT: 72 IN | DIASTOLIC BLOOD PRESSURE: 65 MMHG | TEMPERATURE: 97 F | HEART RATE: 78 BPM | SYSTOLIC BLOOD PRESSURE: 114 MMHG | BODY MASS INDEX: 30.2 KG/M2 | WEIGHT: 223 LBS

## 2021-05-13 DIAGNOSIS — G60.9 IDIOPATHIC PERIPHERAL NEUROPATHY: Primary | ICD-10-CM

## 2021-05-13 DIAGNOSIS — L03.031 PARONYCHIA OF GREAT TOE, RIGHT: ICD-10-CM

## 2021-05-13 DIAGNOSIS — R26.81 GAIT INSTABILITY: ICD-10-CM

## 2021-05-13 DIAGNOSIS — B35.3 TINEA PEDIS OF BOTH FEET: ICD-10-CM

## 2021-05-13 DIAGNOSIS — L60.0 INGROWN NAIL: ICD-10-CM

## 2021-05-13 PROCEDURE — 99999 PR PBB SHADOW E&M-EST. PATIENT-LVL V: ICD-10-PCS | Mod: PBBFAC,,, | Performed by: PODIATRIST

## 2021-05-13 PROCEDURE — 99213 PR OFFICE/OUTPT VISIT, EST, LEVL III, 20-29 MIN: ICD-10-PCS | Mod: S$PBB,,, | Performed by: PODIATRIST

## 2021-05-13 PROCEDURE — 99213 OFFICE O/P EST LOW 20 MIN: CPT | Mod: S$PBB,,, | Performed by: PODIATRIST

## 2021-05-13 PROCEDURE — 99999 PR PBB SHADOW E&M-EST. PATIENT-LVL V: CPT | Mod: PBBFAC,,, | Performed by: PODIATRIST

## 2021-05-13 PROCEDURE — 99215 OFFICE O/P EST HI 40 MIN: CPT | Mod: PBBFAC,PN | Performed by: PODIATRIST

## 2021-06-24 ENCOUNTER — OFFICE VISIT (OUTPATIENT)
Dept: PODIATRY | Facility: CLINIC | Age: 86
End: 2021-06-24
Payer: MEDICARE

## 2021-06-24 VITALS
SYSTOLIC BLOOD PRESSURE: 120 MMHG | DIASTOLIC BLOOD PRESSURE: 74 MMHG | HEART RATE: 63 BPM | RESPIRATION RATE: 18 BRPM | WEIGHT: 223 LBS | BODY MASS INDEX: 30.2 KG/M2 | HEIGHT: 72 IN

## 2021-06-24 DIAGNOSIS — L03.031 PARONYCHIA OF GREAT TOE, RIGHT: ICD-10-CM

## 2021-06-24 DIAGNOSIS — R60.0 LOCALIZED EDEMA: ICD-10-CM

## 2021-06-24 DIAGNOSIS — B35.3 TINEA PEDIS OF BOTH FEET: ICD-10-CM

## 2021-06-24 DIAGNOSIS — G60.9 IDIOPATHIC PERIPHERAL NEUROPATHY: Primary | ICD-10-CM

## 2021-06-24 PROCEDURE — 99213 OFFICE O/P EST LOW 20 MIN: CPT | Mod: S$PBB,,, | Performed by: PODIATRIST

## 2021-06-24 PROCEDURE — 99215 OFFICE O/P EST HI 40 MIN: CPT | Mod: PBBFAC,PN | Performed by: PODIATRIST

## 2021-06-24 PROCEDURE — 99999 PR PBB SHADOW E&M-EST. PATIENT-LVL V: ICD-10-PCS | Mod: PBBFAC,,, | Performed by: PODIATRIST

## 2021-06-24 PROCEDURE — 99999 PR PBB SHADOW E&M-EST. PATIENT-LVL V: CPT | Mod: PBBFAC,,, | Performed by: PODIATRIST

## 2021-06-24 PROCEDURE — 99213 PR OFFICE/OUTPT VISIT, EST, LEVL III, 20-29 MIN: ICD-10-PCS | Mod: S$PBB,,, | Performed by: PODIATRIST

## 2021-06-24 RX ORDER — DICLOFENAC SODIUM 50 MG/1
50 TABLET, DELAYED RELEASE ORAL
COMMUNITY
Start: 2021-06-13

## 2021-06-24 RX ORDER — FLUTICASONE PROPIONATE 50 MCG
SPRAY, SUSPENSION (ML) NASAL
COMMUNITY
Start: 2021-06-21

## 2021-06-24 RX ORDER — LEVOTHYROXINE SODIUM 25 UG/1
TABLET ORAL
COMMUNITY
Start: 2021-05-02 | End: 2021-06-27 | Stop reason: SDUPTHER

## 2021-06-24 RX ORDER — DICLOFENAC SODIUM 50 MG/1
50 TABLET, DELAYED RELEASE ORAL 3 TIMES DAILY
COMMUNITY
Start: 2021-06-01 | End: 2021-06-27 | Stop reason: SDUPTHER

## 2021-06-24 RX ORDER — FINASTERIDE 5 MG/1
TABLET, FILM COATED ORAL
COMMUNITY
Start: 2021-04-01

## 2021-06-24 RX ORDER — OMEPRAZOLE 40 MG/1
40 CAPSULE, DELAYED RELEASE ORAL
COMMUNITY
Start: 2021-06-07

## 2021-06-24 RX ORDER — PRIMIDONE 50 MG/1
TABLET ORAL
COMMUNITY
Start: 2021-03-30 | End: 2021-06-27 | Stop reason: SDUPTHER

## 2021-06-27 PROBLEM — R60.0 LOCALIZED EDEMA: Status: ACTIVE | Noted: 2021-06-27

## 2021-07-18 NOTE — TELEPHONE ENCOUNTER
----- Message from Vinnie Rey sent at 4/30/2020  1:20 PM CDT -----  Type:  Patient Returning Call    Who Called:   Patient  Who Left Message for Patient:  Tatianna  Does the patient know what this is regarding?: Appointment - Toenail issues    Best Call Back Number: [phone]   Additional Information:      
appt scheduled  
complains of pain/discomfort

## 2021-08-12 ENCOUNTER — OFFICE VISIT (OUTPATIENT)
Dept: PODIATRY | Facility: CLINIC | Age: 86
End: 2021-08-12
Payer: MEDICARE

## 2021-08-12 VITALS
BODY MASS INDEX: 29.8 KG/M2 | HEIGHT: 72 IN | HEART RATE: 73 BPM | TEMPERATURE: 98 F | DIASTOLIC BLOOD PRESSURE: 84 MMHG | WEIGHT: 220 LBS | SYSTOLIC BLOOD PRESSURE: 134 MMHG

## 2021-08-12 DIAGNOSIS — G60.9 IDIOPATHIC PERIPHERAL NEUROPATHY: ICD-10-CM

## 2021-08-12 DIAGNOSIS — L60.0 INGROWN NAIL: Primary | ICD-10-CM

## 2021-08-12 DIAGNOSIS — L03.031 PARONYCHIA OF GREAT TOE, RIGHT: ICD-10-CM

## 2021-08-12 PROCEDURE — 99999 PR PBB SHADOW E&M-EST. PATIENT-LVL V: ICD-10-PCS | Mod: PBBFAC,,, | Performed by: PODIATRIST

## 2021-08-12 PROCEDURE — 99999 PR PBB SHADOW E&M-EST. PATIENT-LVL V: CPT | Mod: PBBFAC,,, | Performed by: PODIATRIST

## 2021-08-12 PROCEDURE — 99213 OFFICE O/P EST LOW 20 MIN: CPT | Mod: S$PBB,,, | Performed by: PODIATRIST

## 2021-08-12 PROCEDURE — 99215 OFFICE O/P EST HI 40 MIN: CPT | Mod: PBBFAC,PN | Performed by: PODIATRIST

## 2021-08-12 PROCEDURE — 99213 PR OFFICE/OUTPT VISIT, EST, LEVL III, 20-29 MIN: ICD-10-PCS | Mod: S$PBB,,, | Performed by: PODIATRIST

## 2021-10-08 ENCOUNTER — TELEPHONE (OUTPATIENT)
Dept: CARDIOLOGY | Facility: CLINIC | Age: 86
End: 2021-10-08

## 2021-10-12 RX ORDER — DICLOFENAC SODIUM 75 MG/1
75 TABLET, DELAYED RELEASE ORAL 2 TIMES DAILY
COMMUNITY
Start: 2021-09-28

## 2021-10-12 RX ORDER — LEVOTHYROXINE SODIUM 75 UG/1
75 TABLET ORAL DAILY
COMMUNITY
Start: 2021-09-28

## 2021-10-12 RX ORDER — ALBUTEROL SULFATE 90 UG/1
AEROSOL, METERED RESPIRATORY (INHALATION)
COMMUNITY
Start: 2021-09-20

## 2021-10-12 RX ORDER — LEVOTHYROXINE SODIUM 50 UG/1
50 TABLET ORAL DAILY
COMMUNITY
Start: 2021-09-23

## 2021-10-13 ENCOUNTER — OFFICE VISIT (OUTPATIENT)
Dept: PODIATRY | Facility: CLINIC | Age: 86
End: 2021-10-13
Payer: MEDICARE

## 2021-10-13 ENCOUNTER — TELEPHONE (OUTPATIENT)
Dept: CARDIOLOGY | Facility: CLINIC | Age: 86
End: 2021-10-13

## 2021-10-13 VITALS
SYSTOLIC BLOOD PRESSURE: 127 MMHG | HEART RATE: 66 BPM | HEIGHT: 74 IN | WEIGHT: 224 LBS | DIASTOLIC BLOOD PRESSURE: 79 MMHG | BODY MASS INDEX: 28.75 KG/M2 | RESPIRATION RATE: 18 BRPM

## 2021-10-13 DIAGNOSIS — R60.0 LOCALIZED EDEMA: ICD-10-CM

## 2021-10-13 DIAGNOSIS — G60.9 IDIOPATHIC PERIPHERAL NEUROPATHY: Primary | ICD-10-CM

## 2021-10-13 DIAGNOSIS — L60.0 INGROWN NAIL: ICD-10-CM

## 2021-10-13 DIAGNOSIS — R26.81 GAIT INSTABILITY: ICD-10-CM

## 2021-10-13 DIAGNOSIS — L03.032 PARONYCHIA OF GREAT TOE, LEFT: ICD-10-CM

## 2021-10-13 PROCEDURE — 99999 PR PBB SHADOW E&M-EST. PATIENT-LVL V: ICD-10-PCS | Mod: PBBFAC,,, | Performed by: PODIATRIST

## 2021-10-13 PROCEDURE — 99215 OFFICE O/P EST HI 40 MIN: CPT | Mod: PBBFAC | Performed by: PODIATRIST

## 2021-10-13 PROCEDURE — 99999 PR PBB SHADOW E&M-EST. PATIENT-LVL V: CPT | Mod: PBBFAC,,, | Performed by: PODIATRIST

## 2021-10-13 PROCEDURE — 99213 OFFICE O/P EST LOW 20 MIN: CPT | Mod: S$PBB,,, | Performed by: PODIATRIST

## 2021-10-13 PROCEDURE — 99213 PR OFFICE/OUTPT VISIT, EST, LEVL III, 20-29 MIN: ICD-10-PCS | Mod: S$PBB,,, | Performed by: PODIATRIST

## 2021-10-13 RX ORDER — ALBUTEROL SULFATE 90 UG/1
2 AEROSOL, METERED RESPIRATORY (INHALATION)
COMMUNITY
Start: 2021-09-20 | End: 2021-12-14 | Stop reason: SDUPTHER

## 2021-10-13 RX ORDER — TAMSULOSIN HYDROCHLORIDE 0.4 MG/1
CAPSULE ORAL
COMMUNITY
Start: 2021-09-20

## 2021-12-14 ENCOUNTER — OFFICE VISIT (OUTPATIENT)
Dept: PODIATRY | Facility: CLINIC | Age: 86
End: 2021-12-14
Payer: MEDICARE

## 2021-12-14 VITALS
WEIGHT: 222 LBS | BODY MASS INDEX: 28.49 KG/M2 | HEIGHT: 74 IN | TEMPERATURE: 97 F | SYSTOLIC BLOOD PRESSURE: 136 MMHG | HEART RATE: 65 BPM | DIASTOLIC BLOOD PRESSURE: 76 MMHG

## 2021-12-14 DIAGNOSIS — R26.81 GAIT INSTABILITY: ICD-10-CM

## 2021-12-14 DIAGNOSIS — I73.9 PERIPHERAL VASCULAR DISEASE: ICD-10-CM

## 2021-12-14 DIAGNOSIS — L60.0 INGROWN NAIL: Primary | ICD-10-CM

## 2021-12-14 DIAGNOSIS — G60.9 IDIOPATHIC PERIPHERAL NEUROPATHY: ICD-10-CM

## 2021-12-14 PROCEDURE — 99213 PR OFFICE/OUTPT VISIT, EST, LEVL III, 20-29 MIN: ICD-10-PCS | Mod: S$PBB,,, | Performed by: PODIATRIST

## 2021-12-14 PROCEDURE — 99999 PR PBB SHADOW E&M-EST. PATIENT-LVL V: CPT | Mod: PBBFAC,,, | Performed by: PODIATRIST

## 2021-12-14 PROCEDURE — 99213 OFFICE O/P EST LOW 20 MIN: CPT | Mod: S$PBB,,, | Performed by: PODIATRIST

## 2021-12-14 PROCEDURE — 99215 OFFICE O/P EST HI 40 MIN: CPT | Mod: PBBFAC,PN | Performed by: PODIATRIST

## 2021-12-14 PROCEDURE — 99999 PR PBB SHADOW E&M-EST. PATIENT-LVL V: ICD-10-PCS | Mod: PBBFAC,,, | Performed by: PODIATRIST

## 2021-12-14 RX ORDER — NYSTATIN 100000 [USP'U]/G
POWDER TOPICAL
COMMUNITY
Start: 2021-10-26

## 2021-12-18 PROBLEM — I73.9 PERIPHERAL VASCULAR DISEASE: Status: ACTIVE | Noted: 2021-12-18

## 2022-03-15 ENCOUNTER — OFFICE VISIT (OUTPATIENT)
Dept: PODIATRY | Facility: CLINIC | Age: 87
End: 2022-03-15
Payer: MEDICARE

## 2022-03-15 VITALS
HEART RATE: 77 BPM | SYSTOLIC BLOOD PRESSURE: 117 MMHG | BODY MASS INDEX: 27.72 KG/M2 | DIASTOLIC BLOOD PRESSURE: 76 MMHG | HEIGHT: 74 IN | WEIGHT: 216 LBS | TEMPERATURE: 98 F

## 2022-03-15 DIAGNOSIS — R26.81 GAIT INSTABILITY: ICD-10-CM

## 2022-03-15 DIAGNOSIS — L60.0 INGROWN NAIL: Primary | ICD-10-CM

## 2022-03-15 DIAGNOSIS — G60.9 IDIOPATHIC PERIPHERAL NEUROPATHY: ICD-10-CM

## 2022-03-15 DIAGNOSIS — I73.9 PERIPHERAL VASCULAR DISEASE: ICD-10-CM

## 2022-03-15 PROCEDURE — 99215 OFFICE O/P EST HI 40 MIN: CPT | Mod: PBBFAC,PN | Performed by: PODIATRIST

## 2022-03-15 PROCEDURE — 99213 OFFICE O/P EST LOW 20 MIN: CPT | Mod: S$PBB,,, | Performed by: PODIATRIST

## 2022-03-15 PROCEDURE — 99999 PR PBB SHADOW E&M-EST. PATIENT-LVL V: CPT | Mod: PBBFAC,,, | Performed by: PODIATRIST

## 2022-03-15 PROCEDURE — 99999 PR PBB SHADOW E&M-EST. PATIENT-LVL V: ICD-10-PCS | Mod: PBBFAC,,, | Performed by: PODIATRIST

## 2022-03-15 PROCEDURE — 99213 PR OFFICE/OUTPT VISIT, EST, LEVL III, 20-29 MIN: ICD-10-PCS | Mod: S$PBB,,, | Performed by: PODIATRIST

## 2022-03-25 NOTE — PROGRESS NOTES
Subjective:       Patient ID: Jeffrey Ocasio is a 89 y.o. male.    Chief Complaint: Follow-up, Diabetes Mellitus, and Nail Problem  Patient presents today he is concerned about ingrowing toenails on his big toes.  Patient states his left great toenail has been at especially bothersome.    Follow-up  Associated symptoms include arthralgias and numbness.   Nail Problem  Associated symptoms include arthralgias and numbness.   Ingrown Toenail  Associated symptoms include arthralgias and numbness.   Foot Pain  Associated symptoms include arthralgias and numbness.     Review of Systems   Musculoskeletal: Positive for arthralgias, back pain and gait problem.   Neurological: Positive for numbness.   All other systems reviewed and are negative.      Objective:      Physical Exam  Vitals and nursing note reviewed.   Constitutional:       Appearance: He is well-developed.   Cardiovascular:      Pulses:           Dorsalis pedis pulses are 1+ on the right side and 1+ on the left side.        Posterior tibial pulses are 1+ on the right side and 1+ on the left side.   Pulmonary:      Effort: Pulmonary effort is normal.   Musculoskeletal:         General: Deformity present.      Right foot: Deformity present.      Left foot: Deformity present.   Feet:      Right foot:      Protective Sensation: 4 sites tested. 1 site sensed.     Left foot:      Protective Sensation: 4 sites tested. 1 site sensed.  Skin:     General: Skin is warm.      Capillary Refill: Capillary refill takes more than 3 seconds.      Findings: Erythema present.   Neurological:      Deep Tendon Reflexes: Reflexes abnormal.   Psychiatric:         Behavior: Behavior normal.         Thought Content: Thought content normal.         Judgment: Judgment normal.       On evaluation patient has areas of previously noted ingrown infected toenail on both big toes there is signs of fungal involvement some of the nail is growing into both the medial and lateral border which  raises concern for infection there is some mild discomfort noted upon palpation. Patient does have history of currently displayed signs of interdigital maceration with signs of interdigital fungal involvement. Patient displays findings consistent with previously noted neuropathy bilateral.                                                      Assessment:       1. Ingrown nail    2. Idiopathic peripheral neuropathy    3. Peripheral vascular disease    4. Gait instability        Plan:       Patient presents today he is concerned about ingrowing toenails on his big toes.  Patient states his left great toenail has been at especially bothersome.  Patient is currently seeing a dermatologist for a rash on the top of his left foot.  Patient is now using a walker he states he has become very unstable he has had several falls but is not actually injured himself.  Patient has what appears to be fungal involvement on the dorsal aspect the left foot 1st webspace he states he has been applying a prescription cream that the pharmacist gave him I have recommended that he monitor this area closely patient did have ingrowing toenail lateral border left hallux with early signs of infection I was able to aggressively trim and remove the nail which gave the patient significant relief.  Bacitracin ointment a light dressing was applied to the area patient advised this is not pain-free in 4-5 days he is to contact us immediately for further treatment patient may need a nail avulsion at this continues to be an issue.  Patient had severe interdigital fungal involvement especially in the 4th webspace of the left foot Betadine was applied to the area patient advised he needs to make sure he dries well between his toes to prevent this from becoming an ongoing problem and potential for bacterial infection.  Total face-to-face time equaled 20 min.  Patient is still having stability problems he states he is still continuing to fall he relates the  did have some ingrowing toenails.  Patient states he will be getting new shoes to accommodate for his swelling in both feet he states he feels that these will be much easier for him to get around and will likely contribute to his stability.  This note was created using [employer] voice recognition software that occasionally misinterpreted phrases or words.

## 2022-05-17 ENCOUNTER — TELEPHONE (OUTPATIENT)
Dept: PODIATRY | Facility: CLINIC | Age: 87
End: 2022-05-17
Payer: MEDICARE

## 2022-05-17 NOTE — TELEPHONE ENCOUNTER
----- Message from Marina Shell sent at 5/17/2022 11:10 AM CDT -----  Regarding: appointment  Contact: wife, Funmi Ocasio  Type:  Sooner Appointment Request    Caller is requesting a sooner appointment.  Caller declined first available appointment listed below.  Caller will not accept being placed on the waitlist and is requesting a message be sent to doctor.    Name of Caller:  wife, Funmi Ocasio  When is the first available appointment?  05/26/22  Symptoms:  trim nails  Best Call Back Number:  [phone] (home)   Additional Information:  Patient's wife would like patient to be seen today if possible. His dermatologist recommended he get them trimmed so they do not cut his skin. Please call patient to advise.Thanks!

## 2022-05-17 NOTE — TELEPHONE ENCOUNTER
Advised patient's wife at this time there are no available appointments this week. Please keep scheduled appt on 5/26/2022. If a time slot comes open this week I will contact her to see if patient is available to come in. She verbalized understanding

## 2022-05-26 ENCOUNTER — OFFICE VISIT (OUTPATIENT)
Dept: PODIATRY | Facility: CLINIC | Age: 87
End: 2022-05-26
Payer: MEDICARE

## 2022-05-26 VITALS
DIASTOLIC BLOOD PRESSURE: 74 MMHG | SYSTOLIC BLOOD PRESSURE: 124 MMHG | TEMPERATURE: 98 F | WEIGHT: 215 LBS | HEIGHT: 74 IN | BODY MASS INDEX: 27.59 KG/M2 | HEART RATE: 67 BPM

## 2022-05-26 DIAGNOSIS — G60.9 IDIOPATHIC PERIPHERAL NEUROPATHY: ICD-10-CM

## 2022-05-26 DIAGNOSIS — I73.9 PERIPHERAL VASCULAR DISEASE: ICD-10-CM

## 2022-05-26 DIAGNOSIS — B35.3 TINEA PEDIS OF BOTH FEET: ICD-10-CM

## 2022-05-26 DIAGNOSIS — R60.0 LOCALIZED EDEMA: ICD-10-CM

## 2022-05-26 DIAGNOSIS — L60.0 INGROWN NAIL: Primary | ICD-10-CM

## 2022-05-26 PROCEDURE — 99215 OFFICE O/P EST HI 40 MIN: CPT | Mod: PBBFAC,PN | Performed by: PODIATRIST

## 2022-05-26 PROCEDURE — 99213 OFFICE O/P EST LOW 20 MIN: CPT | Mod: S$PBB,,, | Performed by: PODIATRIST

## 2022-05-26 PROCEDURE — 99999 PR PBB SHADOW E&M-EST. PATIENT-LVL V: ICD-10-PCS | Mod: PBBFAC,,, | Performed by: PODIATRIST

## 2022-05-26 PROCEDURE — 99213 PR OFFICE/OUTPT VISIT, EST, LEVL III, 20-29 MIN: ICD-10-PCS | Mod: S$PBB,,, | Performed by: PODIATRIST

## 2022-05-26 PROCEDURE — 99999 PR PBB SHADOW E&M-EST. PATIENT-LVL V: CPT | Mod: PBBFAC,,, | Performed by: PODIATRIST

## 2022-05-26 RX ORDER — MUPIROCIN 20 MG/G
OINTMENT TOPICAL
COMMUNITY
Start: 2022-05-24 | End: 2022-06-07

## 2022-05-26 RX ORDER — FAMOTIDINE 40 MG/1
40 TABLET, FILM COATED ORAL
COMMUNITY
Start: 2022-05-11

## 2022-05-26 RX ORDER — DONEPEZIL HYDROCHLORIDE 5 MG/1
5 TABLET, FILM COATED ORAL
COMMUNITY
Start: 2022-05-11

## 2022-05-26 RX ORDER — SULFAMETHOXAZOLE AND TRIMETHOPRIM 800; 160 MG/1; MG/1
TABLET ORAL
COMMUNITY
Start: 2022-05-24 | End: 2022-06-03

## 2022-05-27 NOTE — PROGRESS NOTES
Subjective:       Patient ID: Jeffrey Ocasio is a 89 y.o. male.    Chief Complaint: Follow-up and Nail Problem  Patient presents today he is concerned about ingrowing toenails on his big toes.  Patient states his left great toenail has been at especially bothersome.    Follow-up  Associated symptoms include arthralgias and numbness.   Nail Problem  Associated symptoms include arthralgias and numbness.   Ingrown Toenail  Associated symptoms include arthralgias and numbness.   Foot Pain  Associated symptoms include arthralgias and numbness.     Review of Systems   Musculoskeletal: Positive for arthralgias, back pain and gait problem.   Neurological: Positive for numbness.   All other systems reviewed and are negative.      Objective:      Physical Exam  Vitals and nursing note reviewed.   Constitutional:       Appearance: He is well-developed.   Cardiovascular:      Pulses:           Dorsalis pedis pulses are 1+ on the right side and 1+ on the left side.        Posterior tibial pulses are 1+ on the right side and 1+ on the left side.   Pulmonary:      Effort: Pulmonary effort is normal.   Musculoskeletal:         General: Deformity present.      Right foot: Deformity present.      Left foot: Deformity present.   Feet:      Right foot:      Protective Sensation: 4 sites tested. 1 site sensed.     Left foot:      Protective Sensation: 4 sites tested. 1 site sensed.  Skin:     General: Skin is warm.      Capillary Refill: Capillary refill takes more than 3 seconds.      Findings: Erythema present.   Neurological:      Deep Tendon Reflexes: Reflexes abnormal.   Psychiatric:         Behavior: Behavior normal.         Thought Content: Thought content normal.         Judgment: Judgment normal.       On evaluation patient has areas of previously noted ingrown infected toenail on both big toes there is signs of fungal involvement some of the nail is growing into both the medial and lateral border which raises concern for  infection there is some mild discomfort noted upon palpation. Patient does have history of currently displayed signs of interdigital maceration with signs of interdigital fungal involvement. Patient displays findings consistent with previously noted neuropathy bilateral.                                                              Assessment:       1. Ingrown nail    2. Idiopathic peripheral neuropathy    3. Tinea pedis of both feet    4. Peripheral vascular disease    5. Localized edema        Plan:       Patient presents today he is concerned about ingrowing toenails on his big toes.  Patient states his left great toenail has been at especially bothersome.  Patient is currently seeing a dermatologist for a rash on the top of his left foot.  Patient is now using a walker he states he has become very unstable he has had several falls but is not actually injured himself.  Patient has what appears to be fungal involvement on the dorsal aspect the left foot 1st webspace he states he has been applying a prescription cream that the pharmacist gave him I have recommended that he monitor this area closely patient did have ingrowing toenail lateral border left hallux with early signs of infection I was able to aggressively trim and remove the nail which gave the patient significant relief.  Bacitracin ointment a light dressing was applied to the area patient advised this is not pain-free in 4-5 days he is to contact us immediately for further treatment patient may need a nail avulsion at this continues to be an issue.  Patient had severe interdigital fungal involvement especially in the 4th webspace of the left foot Betadine was applied to the area patient advised he needs to make sure he dries well between his toes to prevent this from becoming an ongoing problem and potential for bacterial infection.  Total face-to-face time equaled 20 min.  Patient is still having stability problems he states he is still continuing to  fall he relates the did have some ingrowing toenails.  Patient states the new shoes he got of made but significant difference in in being more stable he has purchased some [employer]ors shoes which definitely accommodate for the swelling more.  Patient states he tried to cut the toenail on the 5th digit left ended up cutting the skin there is dry blood around this area but it does not appear to be infected.  This note was created using [employer] voice recognition software that occasionally misinterpreted phrases or words.

## 2022-09-01 ENCOUNTER — OFFICE VISIT (OUTPATIENT)
Dept: PODIATRY | Facility: CLINIC | Age: 87
End: 2022-09-01
Payer: MEDICARE

## 2022-09-01 VITALS
HEIGHT: 74 IN | BODY MASS INDEX: 29.52 KG/M2 | RESPIRATION RATE: 18 BRPM | DIASTOLIC BLOOD PRESSURE: 73 MMHG | WEIGHT: 230 LBS | SYSTOLIC BLOOD PRESSURE: 112 MMHG | HEART RATE: 67 BPM

## 2022-09-01 DIAGNOSIS — B35.3 TINEA PEDIS OF BOTH FEET: ICD-10-CM

## 2022-09-01 DIAGNOSIS — B35.1 ONYCHOMYCOSIS OF TOENAIL: ICD-10-CM

## 2022-09-01 DIAGNOSIS — I73.9 PERIPHERAL VASCULAR DISEASE: ICD-10-CM

## 2022-09-01 DIAGNOSIS — G60.9 IDIOPATHIC PERIPHERAL NEUROPATHY: Primary | ICD-10-CM

## 2022-09-01 PROCEDURE — 99999 PR PBB SHADOW E&M-EST. PATIENT-LVL V: ICD-10-PCS | Mod: PBBFAC,,, | Performed by: PODIATRIST

## 2022-09-01 PROCEDURE — 99213 PR OFFICE/OUTPT VISIT, EST, LEVL III, 20-29 MIN: ICD-10-PCS | Mod: S$PBB,,, | Performed by: PODIATRIST

## 2022-09-01 PROCEDURE — 99213 OFFICE O/P EST LOW 20 MIN: CPT | Mod: S$PBB,,, | Performed by: PODIATRIST

## 2022-09-01 PROCEDURE — 99999 PR PBB SHADOW E&M-EST. PATIENT-LVL V: CPT | Mod: PBBFAC,,, | Performed by: PODIATRIST

## 2022-09-01 PROCEDURE — 99215 OFFICE O/P EST HI 40 MIN: CPT | Mod: PBBFAC | Performed by: PODIATRIST

## 2022-09-01 RX ORDER — KETOCONAZOLE 20 MG/G
1 CREAM TOPICAL DAILY
COMMUNITY
Start: 2022-08-31

## 2022-09-01 RX ORDER — ALBUTEROL SULFATE 90 UG/1
2 AEROSOL, METERED RESPIRATORY (INHALATION)
COMMUNITY
Start: 2022-06-28

## 2022-09-01 RX ORDER — MUPIROCIN 20 MG/G
OINTMENT TOPICAL 3 TIMES DAILY
COMMUNITY
Start: 2022-08-31

## 2022-09-01 RX ORDER — TRAMADOL HYDROCHLORIDE 50 MG/1
50 TABLET ORAL 3 TIMES DAILY PRN
COMMUNITY
Start: 2022-08-26

## 2022-09-01 RX ORDER — APIXABAN 5 MG/1
5 TABLET, FILM COATED ORAL 2 TIMES DAILY
COMMUNITY
Start: 2022-08-10

## 2022-09-01 RX ORDER — TRAMADOL HYDROCHLORIDE 50 MG/1
50 TABLET ORAL
COMMUNITY
Start: 2022-08-01

## 2022-09-01 RX ORDER — IPRATROPIUM BROMIDE 42 UG/1
SPRAY, METERED NASAL
COMMUNITY
Start: 2022-08-01

## 2022-09-01 RX ORDER — IPRATROPIUM BROMIDE 42 UG/1
2 SPRAY, METERED NASAL
COMMUNITY
Start: 2022-08-01

## 2022-09-01 RX ORDER — GABAPENTIN 300 MG/1
300 CAPSULE ORAL
COMMUNITY
Start: 2022-08-15

## 2022-09-08 ENCOUNTER — HOSPITAL ENCOUNTER (OUTPATIENT)
Dept: RADIOLOGY | Facility: HOSPITAL | Age: 87
Discharge: HOME | End: 2022-09-08
Attending: PHYSICAL MEDICINE & REHABILITATION
Payer: MEDICARE

## 2022-09-08 DIAGNOSIS — M54.50 LOWER BACK PAIN: ICD-10-CM

## 2022-09-08 PROCEDURE — 72148 MRI LUMBAR SPINE W/O DYE: CPT | Mod: 26,,, | Performed by: RADIOLOGY

## 2022-09-08 PROCEDURE — 72148 MRI LUMBAR SPINE WITHOUT CONTRAST: ICD-10-PCS | Mod: 26,,, | Performed by: RADIOLOGY

## 2022-09-08 PROCEDURE — 72148 MRI LUMBAR SPINE W/O DYE: CPT | Mod: TC

## 2022-09-09 NOTE — PROGRESS NOTES
Subjective:       Patient ID: Jeffrey Ocasio is a 89 y.o. male.    Chief Complaint: Follow-up  Patient presents with his wife for follow-up due to neuropathy, PVD, history of chronic swelling with DVT, rash, nail problem.  Patient is not very active, multiple knee surgeries, limited mobility.  Reports red rash on the top of the left foot, no pain    Past Medical History:   Diagnosis Date    Allergy     Asthma     DVT (deep venous thrombosis)     Lymphedema of right lower extremity      Past Surgical History:   Procedure Laterality Date    KNEE SURGERY      7 left knee surgery      Family History   Problem Relation Age of Onset    No Known Problems Mother     No Known Problems Father      Social History     Socioeconomic History    Marital status:    Tobacco Use    Smoking status: Never    Smokeless tobacco: Never   Substance and Sexual Activity    Alcohol use: No    Drug use: No    Sexual activity: Never       Current Outpatient Medications   Medication Sig Dispense Refill    albuterol (PROVENTIL/VENTOLIN HFA) 90 mcg/actuation inhaler 2 puffs.      aspirin (ECOTRIN) 81 MG EC tablet Take 81 mg by mouth once daily.      biotin 5 mg Cap Take by mouth.      cholecalciferol, vitamin D3, 5,000 unit Tab Take by mouth.      cyclobenzaprine (FLEXERIL) 5 MG tablet TAKE 1 TABLET BY MOUTH THREE TIMES A DAY AS NEEDED FOR MUSCLE SPASM  0    donepeziL (ARICEPT) 5 MG tablet 5 mg.      ELIQUIS 5 mg Tab Take 5 mg by mouth 2 (two) times daily.      escitalopram oxalate (LEXAPRO) 10 MG tablet Take 10 mg by mouth once daily.  3    famotidine (PEPCID) 40 MG tablet 40 mg.      finasteride (PROSCAR) 5 mg tablet   See Instructions, TAKE 1 TABLET BY MOUTH EVERY DAY, # 90 tab, 3 Refill(s), Pharmacy: [id number], 183, cm, 03/16/21 10:13:00 CDT, Height/Length Measured, 105.7, kg, 03/16/21 10:13:00 CDT, Weight Dosing      fish oil-omega-3 fatty acids 300-1,000 mg capsule Take 2 capsules by mouth 2 (two) times daily.       fluticasone propionate (FLONASE) 50 mcg/actuation nasal spray   16 gm, INSTILL 2 SPRAYS IN EACH NOSTRIL TWICE DAILY, 0 Refill(s)      furosemide (LASIX) 40 MG tablet 40 mg.      gabapentin (NEURONTIN) 300 MG capsule Take 1 capsule by mouth.      glucosamine-chondroitin 500-400 mg tablet Take 1 tablet by mouth 3 (three) times daily.      ipratropium (ATROVENT) 42 mcg (0.06 %) nasal spray 2 sprays.      sjgdongad-[id number]-[id number]-algal oil (METANX/FOLTANX RF) 3 mg-35 mg-2 mg -90.314 mg Cap Take by mouth.      melatonin 1 mg Tab Take by mouth nightly as needed.      methocarbamoL (ROBAXIN) 750 MG Tab 750 mg.      multivitamin (THERAGRAN) per tablet Take 1 tablet by mouth once daily.      naproxen (NAPROSYN) 375 MG tablet Take 375 mg by mouth 2 (two) times daily with meals.      omeprazole (PRILOSEC) 40 MG capsule 40 mg.      primidone (MYSOLINE) 50 MG Tab Take 100 mg by mouth 3 (three) times daily.      rosuvastatin (CRESTOR) 5 MG tablet Take 5 mg by mouth once daily.  1    SYNTHROID 75 mcg tablet Take 75 mcg by mouth once daily.      traMADoL (ULTRAM) 50 mg tablet Take 50 mg by mouth 3 (three) times daily as needed.      TURMERIC ORAL Take by mouth once daily.      albuterol (PROVENTIL/VENTOLIN HFA) 90 mcg/actuation inhaler SMARTSI Puff(s) By Mouth Every 6 Hours PRN      apixaban (ELIQUIS) 5 mg Tab 5 mg.      diclofenac (VOLTAREN) 50 MG EC tablet 50 mg.      diclofenac (VOLTAREN) 75 MG EC tablet Take 75 mg by mouth 2 (two) times daily.      gabapentin (NEURONTIN) 300 MG capsule 300 mg.      ipratropium (ATROVENT) 42 mcg (0.06 %) nasal spray SMARTSIG:Both Nares      isosorbide mononitrate (IMDUR) 30 MG 24 hr tablet Take 1 tablet (30 mg total) by mouth once daily. 30 tablet 11    ketoconazole (NIZORAL) 2 % cream Apply 1 application topically once daily.      KLOR-CON M20 20 mEq tablet TAKE 1 TABLET BY MOUTH 3 TIMES A DAY FOR 1 WEEK THEN USE 1 TABLET DAILY AS NEEDED WITH THE LASIX  5    magnesium citrate 100 mg Tab Take 1  "tablet by mouth once.      meclizine (ANTIVERT) 25 mg tablet Take 25 mg by mouth 3 (three) times daily as needed.  3    mupirocin (BACTROBAN) 2 % ointment Apply topically 3 (three) times daily.      nitroGLYCERIN (NITROSTAT) 0.4 MG SL tablet PLACE 1 TABLET UNDER TONGUE EVERY 5 MINS, UP TO 3 DOSES IN 15 MINS AS NEEDED FOR CHEST PAIN, [id number]  2    nystatin (MYCOSTATIN) powder SMARTSI Topical Every Night      SYNTHROID 25 mcg tablet       SYNTHROID 50 mcg tablet Take 50 mcg by mouth once daily.      tamsulosin (FLOMAX) 0.4 mg Cap   = 1 cap, Oral, BID, # 180 cap, 3 Refill(s), Pharmacy: [facility] #[id number], 183, cm, 21 10:49:00 CDT, Height/Length Measured, 103.5, kg, 21 10:49:00 CDT, Weight Dosing      traMADoL (ULTRAM) 50 mg tablet 50 mg.      triamcinolone acetonide 0.1% (KENALOG) 0.1 % ointment APPLY SPARINGLY TO THE AFFECTED AREA TWICE DAILY      WAL-PHED 30 mg tablet Take 30 mg by mouth 2 (two) times daily.      WALKER MISC   extra tall walker with wheels, See Instructions, needs walker for ambulation, # 1 EA, 0 Refill(s), 188, cm, 22 13:40:00 CDT, Height/Length Measured, 101.9, kg, 22 13:40:00 CDT, Weight Dosing       No current facility-administered medications for this visit.     Review of patient's allergies indicates:   Allergen Reactions    Grass pollen     Fish containing products        Review of Systems   HENT:  Negative for congestion.    Respiratory:  Negative for cough.    Cardiovascular:  Positive for leg swelling.   Musculoskeletal:  Positive for arthralgias and gait problem.   All other systems reviewed and are negative.    Objective:      Vitals:    22 1145   BP: 112/73   Pulse: 67   Resp: 18   Weight: 104.3 kg (230 lb)   Height: 6' 2" (1.88 m)     Physical Exam  Vitals and nursing note reviewed. Exam conducted with a chaperone present.   Constitutional:       General: He is not in acute distress.  Cardiovascular:      Pulses:           Dorsalis pedis pulses " are 1+ on the right side and 1+ on the left side.        Posterior tibial pulses are 1+ on the right side and 1+ on the left side.   Pulmonary:      Effort: Pulmonary effort is normal.   Musculoskeletal:      Right lower leg: Edema present.      Left lower leg: Edema present.      Right foot: Decreased range of motion.      Left foot: Decreased range of motion.   Feet:      Right foot:      Toenail Condition: Right toenails are abnormally thick and long. Fungal disease present.     Left foot:      Skin integrity: Dry skin (Rash dorsal left foot, no pain) present.      Toenail Condition: Left toenails are abnormally thick and long. Fungal disease present.  Skin:     Capillary Refill: Capillary refill takes 2 to 3 seconds.      Findings: Rash present.      Comments: Localized erythematous rash dorsal left foot   Neurological:      Sensory: Sensory deficit present.   Psychiatric:         Mood and Affect: Mood normal.         Behavior: Behavior normal.                          Assessment:       1. Idiopathic peripheral neuropathy    2. Tinea pedis of both feet    3. Peripheral vascular disease    4. Onychomycosis of toenail          Plan:         Instructed to apply over the counter athlete's foot cream twice daily x2 weeks to the rash on the top of the left foot, avoid getting it between the toes.    Explained it is important to keep between the toes dry to avoid complications due to swelling  Reviewed chronic swelling, tension on the skin, care and maintenance of dry skin to prevent complications  Reviewed treatment of dry skin  Reviewed neuropathy and diminished sensation  We also reviewed peripheral vascular disease and potential complications monitoring for any areas redness or any open sores which are not improving, monitoring color of toes  Reviewed care and maintenance of fungal nails to avoid complications.  Nails debrided in thickness reduced.    Reviewed soaking regimens discussed importance of regular foot  checks contacting the office with any area of concern which has not improved in a few days  Patient was in understanding and agreement with treatment plan.  I counseled the patient on their conditions, implications and medical management.  Instructed patient/family to contact the office with any changes, questions, concerns, worsening of symptoms.   Total face to face time, exam, assessment, treatment, discussion, documentation 20 minutes, more than half this time spent on consultation and coordination of care.   Follow up prn 3 months    This note was created using [employer] voice recognition software that occasionally misinterpreted phrases or words.

## 2022-10-17 DIAGNOSIS — R53.1 WEAKNESS: Primary | ICD-10-CM

## 2022-11-08 ENCOUNTER — CLINICAL SUPPORT (OUTPATIENT)
Dept: REHABILITATION | Facility: HOSPITAL | Age: 87
End: 2022-11-08
Payer: MEDICARE

## 2022-11-08 DIAGNOSIS — Z74.09 IMPAIRED FUNCTIONAL MOBILITY, BALANCE, GAIT, AND ENDURANCE: ICD-10-CM

## 2022-11-08 DIAGNOSIS — R53.1 WEAKNESS: ICD-10-CM

## 2022-11-08 PROCEDURE — 97163 PT EVAL HIGH COMPLEX 45 MIN: CPT | Mod: PN

## 2022-11-09 NOTE — PLAN OF CARE
"[facility]  Physical Therapy Initial Evaluation    Name: Rosie Ocasio  Clinic Number: [id number]    Therapy Diagnosis:   Encounter Diagnoses   Name Primary?    Weakness     Impaired functional mobility, balance, gait, and endurance      Physician: Cinthia Shrestha MD    Physician Orders: PT Eval and Treat   Medical Diagnosis from Referral: Leg weakness, shoulder pain, spinal stenosis, OA right shoulder   Evaluation Date: 11/8/2022  Authorization Period Expiration: 11/8/2023  Plan of Care Expiration: 01/31/2023  Visit # / Visits authorized: 1/ 1  FOTO Visit #:  1/3    Time In: 10:00 am   Time Out: 11:00 am   Total Appointment Time (timed & untimed codes): 60  minutes    Precautions: Standard and Fall    Subjective   Date of onset: chronic over the past 3-4 years with recent worsening of LE weakness and functional mobility   History of current condition - ROSIE reports: "I've just gotten weak, it's hard for me to get around"  Rosie reports that he doesn't leave the house much anymore unless it's for an appointment. He reports needing assistance for dressing and bathing, Is able to ambulate in the house for short distances with use of his rollator and assistance. Rosie also reports frequent falls at home.  Had one a week ago while getting out of the shower - slipped off of the seat and his wife, Funmi, was not able to get him up.  They called the Fire Department who were able to assist him up off of the floor.  Rosie also has dementia that appears to be progressing.  He was able to tell me the year, recognized several staff members who he has known for years. Does have some word finding difficulty and loses track of his thoughts.   Per wife: they had been getting Home Health therapy for the past couple of months,  She felt that Rosie would benefit from more aggressive therapy if she was going to try and keep him from going to an [facility].  She reports hiring a caregiver to help them with " transfers, walking, bathing, etc as she was having difficulty managing Jeffrey by herself.    Jeffrey has history of lumbar spinal stenosis - recent MRI in September showed severe spinal stenosis at L4/L5 with bilateral foraminal stenosis at this level. He also has severe OA in right GH joint - bone-on-bone - multiple steroid injections that have not been helpful. Jeffrey has had PT here at this clinic on multiple occasions for back and right shoulder - all with limited lasting improvement.      Medical History:   Past Medical History:   Diagnosis Date    Allergy     Asthma     DVT (deep venous thrombosis)     Lymphedema of right lower extremity         Surgical History:   Jeffrey Ocasio  has a past surgical history that includes Knee surgery.    Medications:   Jeffrey has a current medication list which includes the following prescription(s): albuterol, albuterol, apixaban, aspirin, biotin, cholecalciferol (vitamin d3), cyclobenzaprine, diclofenac, diclofenac, donepezil, eliquis, escitalopram oxalate, famotidine, finasteride, fish oil-omega-3 fatty acids, fluticasone propionate, furosemide, gabapentin, gabapentin, glucosamine-chondroitin, ipratropium, ipratropium, isosorbide mononitrate, ketoconazole, klor-con m20, wwrywsroj-y1-vrd71-algal oil, magnesium citrate, meclizine, melatonin, methocarbamol, multivitamin, mupirocin, naproxen, nitroglycerin, nystatin, omeprazole, primidone, rosuvastatin, synthroid, synthroid, synthroid, tamsulosin, tramadol, tramadol, triamcinolone acetonide 0.1%, turmeric, wal-phed, and walker.    Allergies:   Review of patient's allergies indicates:   Allergen Reactions    Grass pollen     Fish containing products         Imaging, : see above     Prior Therapy: Jeffrey was receiving HHPT up until a few weeks ago.  He has also been a patient here at this clinic in the past.   Social History: lives with wife, single story home, walk in shower with seat, currently has a caregiver to help during  the day.    Occupation: retired    Prior Level of Function: Ambulation with use of Rollator, able to walk outside of the home, able to dress independently, able to shower independently 4 months ago.   Current Level of Function: Requires assist with all ambulation using rollator for very short distances; has W/C at home, assist with dressing, assist with showering; multiple falls - most recent last week - fell of seat in shower; Able to propel W/C indep; At times requires assist to stand from chair, other times is able to perform mod I. Difficulty raising right arm above his head secondary to shoulder OA/pain     Pain:  Current 2/10, worst 7/10, best 2/10   Location: Right shoulder and lower back; also has peripheral edema in bilateral LE's  - controls with Lasix and use of lymphedema pumps.   Description: Aching  Aggravating Factors: Standing, Walking, Morning, Lifting, and Getting out of bed/chair  Easing Factors: sitting or lying down     Pts goals: To get my legs stronger so that I can walk better     Objective     Posture: Standing: Assisted with use of walker - forward head, rounded shoulders, forward flexed trunk; flexed hips and knees       Sitting: Slumped sitting     Range of Motion:   UEs Limited right shoulder movement secondary to severe OA; functional at waist level; LUE WFL   LEs Limited AROM secondary weakness and peripheral edema, able to lift RLE off table, requires assist with LLE. Unable to fully extend knees - left worse than right;     Upper Extremity Strength  RUE Grossly 3-/5 at shoulder; 3/5 elbow/wrist  LUE Grossly 3+/5   : good  equal right/left     Lower Extremity Strength  RLE Grossly assessed: 3/5 at hip; 3+/5 > 4-/5 quad; 3-/5 DF; 3-/5 PF   LLE Grossly assessed: 3+/5 at hip; 4-/5 quad; DF/PF 3-/5     Functional mobility:    Gait: Patient ambulating with use of RW with CGA assistance for 4ft; Able to ambulate in // bars with bilateral hand rails x 16ft feet with CGA with  the following gait abnormalities: decreased clearance of bilateral feet - slides feet forward, flexed hips and bilateral knees - unable to straighten knees out  and noted giving way at times. Decreased ability to weight shift.   Turns: extra steps on turn around with use of AD     Transfers:    Sit to stand: CGA > Min A with use of RW - varies with height of surface and time of day.    Squat pivot: Set-up and SBA    Stand pivot: Min A with use of RW    Supine to sit: Mod I     Stairs:  Not assessed - patient unable to negotiate steps;     Sensation: diminished sensation bilateral lower legs/feet - patient has peripheral neuropathy      PT Evaluation Completed? Yes  Discussed Plan of Care with patient: Yes       Limitation/Restriction for FOTO LE weakness  Survey    Therapist reviewed FOTO scores for Jeffrey Ocasio on 11/8/2022.   FOTO documents entered into [employer] - see Media section.    Limitation Score: 73 %         Home Exercises and Patient Education Provided    Education provided:   - continuation of HEP     Written Home Exercises Provided: Patient instructed to cont prior HEP.  JEFFREY demonstrated good  understanding of the education provided     Assessment   Jeffrey is a 89 y.o. male referred to outpatient Physical Therapy with a medical diagnosis of lumbar spinal stenosis, right shoulder OA, bilateral LE weakness. Pt presents with cognitive decline, impaired functional mobility, gait, balance and endurance, BLE weakness, peripheral neurpathy     Pt prognosis is Guarded.   Pt will benefit from skilled outpatient Physical Therapy to address the deficits stated above and in the chart below, provide pt/family education, and to maximize pt's level of independence.     Plan of care discussed with patient and wife Yes  Pt's spiritual, cultural and educational needs considered and patient is agreeable to the plan of care and goals as stated below:     Anticipated Barriers for therapy: patient's cognitive decline,  general health     Medical Necessity is demonstrated by the following  History  Co-morbidities and personal factors that may impact the plan of care Co-morbidities:   advanced age, CHF, prior lumbar surgery, transportation assistance required, and dementia, lumbar stenosis, shoulder OA     Personal Factors:   age  lifestyle     high   Examination  Body Structures and Functions, activity limitations and participation restrictions that may impact the plan of care Body Regions:   back  lower extremities  upper extremities  trunk    Body Systems:    gross symmetry  ROM  strength  gross coordinated movement  balance  gait  transfers  edema    Participation Restrictions:   Requires cueing, assistance, fall risk     Activity limitations:   Learning and applying knowledge  no deficits    General Tasks and Commands  undertaking multiple tasks    Communication  no deficits    Mobility  lifting and carrying objects  walking    Self care  washing oneself (bathing, drying, washing hands)  toileting  dressing  looking after one's health    Domestic Life  Unable to participate in these activities     Interactions/Relationships  no deficits    Life Areas  no deficits    Community and Social Life  community life  recreation and leisure         high   Clinical Presentation evolving clinical presentation with changing clinical characteristics high   Decision Making/ Complexity Score: high     Goals:  Long Term Goals: 6 weeks   Able to perform sit <> stand with RW and SBA   Able to perform squat pivot t/f from W/C <> bed/chair/toilet with supervision   Able to ambulate with RW and CGA x 15ft   FOTO limitation score improved to 53%     Plan   Plan of care Certification: 11/8/2022 to 01/31/2023.    Outpatient Physical Therapy 2 times weekly for 6 weeks to include the following interventions: Gait Training, Neuromuscular Re-ed, Patient Education, Therapeutic Activities, and Therapeutic Exercise.     Dena Bauman, PT

## 2022-11-10 ENCOUNTER — CLINICAL SUPPORT (OUTPATIENT)
Dept: REHABILITATION | Facility: HOSPITAL | Age: 87
End: 2022-11-10
Payer: MEDICARE

## 2022-11-10 DIAGNOSIS — Z74.09 IMPAIRED FUNCTIONAL MOBILITY, BALANCE, GAIT, AND ENDURANCE: Primary | ICD-10-CM

## 2022-11-10 PROCEDURE — 97110 THERAPEUTIC EXERCISES: CPT | Mod: PN,CQ

## 2022-11-10 PROCEDURE — 97530 THERAPEUTIC ACTIVITIES: CPT | Mod: PN,CQ

## 2022-11-10 NOTE — PLAN OF CARE
PT met face to face with Drake Nj PTA to discuss patient's treatment plan and progress towards established goals.  Treatment will be continued as described in initial report/eval and progress notes.  Patient will be seen by physical therapist every sixth visit and minimally once per month.    Additional information: general weakness, cognitive decline; back and right shoulder pain - chronic

## 2022-11-10 NOTE — PROGRESS NOTES
[facility]   Physical Therapy Treatment Note     Name: Jeffrey Ocasio  Clinic Number: [id number]    Therapy Diagnosis:   Encounter Diagnosis   Name Primary?    Impaired functional mobility, balance, gait, and endurance Yes     Physician: Cinthia Shrestha MD    Visit Date: 11/10/2022    Physician Orders: PT Eval and Treat   Medical Diagnosis from Referral: Leg weakness, shoulder pain, spinal stenosis, OA right shoulder   Evaluation Date: 11/8/2022  Authorization Period Expiration: 11/8/2023  Plan of Care Expiration: 01/31/2023  Visit # / Visits authorized: 1/ 12 (2 total)  FOTO Visit #:  1/3    PTA Visit #: 1/5     Time In: 10:25 am  Time Out: 11:15 am  Total Billable Time: 40 minutes    Precautions: Standard and Fall    SUBJECTIVE     Pt reports: having difficulty getting his legs strong enough to walk.  He was compliant with home exercise program.  Response to previous treatment: good  Functional change: none    Pain: 3/10  Location: right shoulder      OBJECTIVE     Objective Measures updated at progress report unless specified.     Treatment     JEFFREY received the treatments listed below:      therapeutic exercises to develop strength, endurance, and flexibility for 25 minutes including:    NuStep level 3 x 15 min  Chair exercises   Knee ext   Heel lifts   Marching     therapeutic activities to improve functional performance for 15  minutes, including:  Squat pivot transfers from chair to NuStep  Standing tolerance in para bars  Car transfer      Patient Education and Home Exercises     Home Exercises Provided and Patient Education Provided     Education provided:   - therapeutic exercise    Written Home Exercises Provided: Patient instructed to cont prior HEP. Exercises were reviewed and JEFFREY was able to demonstrate them prior to the end of the session.  JEFFREY demonstrated good  understanding of the education provided. See EMR under Patient Instructions for exercises provided  during therapy sessions    ASSESSMENT     Jeffrey is a 89 y.o. male referred to outpatient Physical Therapy with a medical diagnosis of lumbar spinal stenosis, right shoulder OA, bilateral LE weakness. Pt presents with cognitive decline, impaired functional mobility, gait, balance and endurance, BLE weakness, peripheral neurpathy     JEFFREY Is progressing well towards his goals.   Pt prognosis is Good.     Pt will continue to benefit from skilled outpatient physical therapy to address the deficits listed in the problem list box on initial evaluation, provide pt/family education and to maximize pt's level of independence in the home and community environment.     Pt's spiritual, cultural and educational needs considered and pt agreeable to plan of care and goals.     Anticipated barriers to physical therapy: none    Goals:   Long Term Goals: 6 weeks   Able to perform sit <> stand with RW and SBA   Able to perform squat pivot t/f from W/C <> bed/chair/toilet with supervision   Able to ambulate with RW and CGA x 15ft   FOTO limitation score improved to 53%     PLAN     Continue with established plan of care.    Outpatient Physical Therapy 2 times weekly for 6 weeks to include the following interventions: Gait Training, Neuromuscular Re-ed, Patient Education, Therapeutic Activities, and Therapeutic Exercise.     Drake Nj, PTA

## 2022-11-15 ENCOUNTER — CLINICAL SUPPORT (OUTPATIENT)
Dept: REHABILITATION | Facility: HOSPITAL | Age: 87
End: 2022-11-15
Payer: MEDICARE

## 2022-11-15 DIAGNOSIS — Z74.09 IMPAIRED FUNCTIONAL MOBILITY, BALANCE, GAIT, AND ENDURANCE: Primary | ICD-10-CM

## 2022-11-15 PROCEDURE — 97530 THERAPEUTIC ACTIVITIES: CPT | Mod: PN,CQ

## 2022-11-15 PROCEDURE — 97110 THERAPEUTIC EXERCISES: CPT | Mod: PN,CQ

## 2022-11-15 NOTE — PROGRESS NOTES
[facility]   Physical Therapy Treatment Note     Name: Jeffrey Ocasio  Clinic Number: [id number]    Therapy Diagnosis:   Encounter Diagnosis   Name Primary?    Impaired functional mobility, balance, gait, and endurance Yes     Physician: Cinthia Shrestha MD    Visit Date: 11/15/2022    Physician Orders: PT Eval and Treat   Medical Diagnosis from Referral: Leg weakness, shoulder pain, spinal stenosis, OA right shoulder   Evaluation Date: 11/8/2022  Authorization Period Expiration: 11/8/2023  Plan of Care Expiration: 01/31/2023  Visit # / Visits authorized: 2 / 12 (3 total)  FOTO Visit #:  1/3    PTA Visit #: 2/5     Time In: 12:30 PM  Time Out: 1:25 PM   Total Billable Time: 40 minutes    Precautions: Standard and Fall    SUBJECTIVE     Pt reports: No new c/o's.   He was compliant with home exercise program.  Response to previous treatment: good  Functional change: none    Pain: 4/10  Location: right shoulder      OBJECTIVE     Objective Measures updated at progress report unless specified.     Treatment     JEFFREY received the treatments listed below:      therapeutic exercises to develop strength, endurance, and flexibility for 25 minutes including:    NuStep level 5 x 15 min; performed ex's then rode 5 more min  Chair exercises   Knee ext x 15   Heel/Toe lifts x 15   Marching x 15  Ball Squeezes x 2 min  Hip Abd w/ YTB x 15  Scapular Retraction w/ gray handled band x 15  Bicep Curls w/ 3# bar x 15    therapeutic activities to improve functional performance for 15  minutes, including:  Squat pivot transfers from chair to NuStep  Standing tolerance in para bars  Car transfer      Patient Education and Home Exercises     Home Exercises Provided and Patient Education Provided     Education provided:   - therapeutic exercise    Written Home Exercises Provided: Patient instructed to cont prior HEP. Exercises were reviewed and JEFFREY was able to demonstrate them prior to the end of the  session.  JEFFREY demonstrated good  understanding of the education provided. See EMR under Patient Instructions for exercises provided during therapy sessions    ASSESSMENT     Jeffrey is a 89 y.o. male referred to outpatient Physical Therapy with a medical diagnosis of lumbar spinal stenosis, right shoulder OA, bilateral LE weakness. Pt presents with cognitive decline, impaired functional mobility, gait, balance and endurance, BLE weakness, peripheral neurpathy     JEFFREY Is progressing well towards his goals.   Pt prognosis is Good.     Pt will continue to benefit from skilled outpatient physical therapy to address the deficits listed in the problem list box on initial evaluation, provide pt/family education and to maximize pt's level of independence in the home and community environment.     Pt's spiritual, cultural and educational needs considered and pt agreeable to plan of care and goals.     Anticipated barriers to physical therapy: none    Goals:   Long Term Goals: 6 weeks   Able to perform sit <> stand with RW and SBA   Able to perform squat pivot t/f from W/C <> bed/chair/toilet with supervision   Able to ambulate with RW and CGA x 15ft   FOTO limitation score improved to 53%     PLAN     Continue with established plan of care.    Outpatient Physical Therapy 2 times weekly for 6 weeks to include the following interventions: Gait Training, Neuromuscular Re-ed, Patient Education, Therapeutic Activities, and Therapeutic Exercise.     Jonathan Favre, PTA

## 2022-11-17 ENCOUNTER — CLINICAL SUPPORT (OUTPATIENT)
Dept: REHABILITATION | Facility: HOSPITAL | Age: 87
End: 2022-11-17
Payer: MEDICARE

## 2022-11-17 DIAGNOSIS — Z74.09 IMPAIRED FUNCTIONAL MOBILITY, BALANCE, GAIT, AND ENDURANCE: Primary | ICD-10-CM

## 2022-11-17 PROCEDURE — 97530 THERAPEUTIC ACTIVITIES: CPT | Mod: PN,CQ

## 2022-11-17 PROCEDURE — 97110 THERAPEUTIC EXERCISES: CPT | Mod: PN,CQ

## 2022-11-17 NOTE — PROGRESS NOTES
[facility]   Physical Therapy Treatment Note     Name: Jeffrey Ocasio  Clinic Number: [id number]    Therapy Diagnosis:   Encounter Diagnosis   Name Primary?    Impaired functional mobility, balance, gait, and endurance Yes     Physician: Cinthia Shrestha MD    Visit Date: 11/17/2022    Physician Orders: PT Eval and Treat   Medical Diagnosis from Referral: Leg weakness, shoulder pain, spinal stenosis, OA right shoulder   Evaluation Date: 11/8/2022  Authorization Period Expiration: 11/8/2023  Plan of Care Expiration: 01/31/2023  Visit # / Visits authorized: 3 / 12 (4 total)  FOTO Visit #:  1/3    PTA Visit #: 3/5     Time In: 11:10 AM  Time Out: 11:50 AM   Total Billable Time: 40 minutes    Precautions: Standard and Fall    SUBJECTIVE     Pt reports: bilateral knee pain in standing.  Left is greater than right.  He was compliant with home exercise program.  Response to previous treatment: good  Functional change: none    Pain: 4/10  Location: right shoulder      OBJECTIVE     Objective Measures updated at progress report unless specified.     Treatment     JEFFREY received the treatments listed below:      therapeutic exercises to develop strength, endurance, and flexibility for 25 minutes including:    NuStep level 5 x 15 min; performed ex's then rode 5 more min  Chair exercises   Knee ext x 15   Heel/Toe lifts x 15   Marching x 15  Ball Squeezes x 2 min  Hip Abd w/ YTB x 15  Scapular Retraction w/ gray handled band x 15  Bicep Curls w/ 3# bar x 15    therapeutic activities to improve functional performance for 15  minutes, including:  Squat pivot transfers from chair to NuStep  Standing tolerance in para bars  Amb in para bars 5 ft x 3 with gait belt/CGA and following with chair  Car transfer  Added KT taping to left knee as test to reduce P/F rub  (Spoke with patients wife who understood the precautions)      Patient Education and Home Exercises     Home Exercises Provided and Patient  Education Provided     Education provided:   - therapeutic exercise    Written Home Exercises Provided: Patient instructed to cont prior HEP. Exercises were reviewed and JEFFREY was able to demonstrate them prior to the end of the session.  JEFFREY demonstrated good  understanding of the education provided. See EMR under Patient Instructions for exercises provided during therapy sessions    ASSESSMENT   Patient did well with exercises but requires verbal cues to complete the task.  Patient complained of increase left knee pain with walking.  Added VMO KT taping to reduce swelling of pes anserine and patellar femoral rub.    Jeffrey is a 89 y.o. male referred to outpatient Physical Therapy with a medical diagnosis of lumbar spinal stenosis, right shoulder OA, bilateral LE weakness. Pt presents with cognitive decline, impaired functional mobility, gait, balance and endurance, BLE weakness, peripheral neurpathy     JEFFREY Is progressing well towards his goals.   Pt prognosis is Good.     Pt will continue to benefit from skilled outpatient physical therapy to address the deficits listed in the problem list box on initial evaluation, provide pt/family education and to maximize pt's level of independence in the home and community environment.     Pt's spiritual, cultural and educational needs considered and pt agreeable to plan of care and goals.     Anticipated barriers to physical therapy: none    Goals:   Long Term Goals: 6 weeks   Able to perform sit <> stand with RW and SBA   Able to perform squat pivot t/f from W/C <> bed/chair/toilet with supervision   Able to ambulate with RW and CGA x 15ft   FOTO limitation score improved to 53%     PLAN     Continue with established plan of care.    Outpatient Physical Therapy 2 times weekly for 6 weeks to include the following interventions: Gait Training, Neuromuscular Re-ed, Patient Education, Therapeutic Activities, and Therapeutic Exercise.     Drake Nj, PTA

## 2022-11-21 ENCOUNTER — CLINICAL SUPPORT (OUTPATIENT)
Dept: REHABILITATION | Facility: HOSPITAL | Age: 87
End: 2022-11-21
Payer: MEDICARE

## 2022-11-21 DIAGNOSIS — Z74.09 IMPAIRED FUNCTIONAL MOBILITY, BALANCE, GAIT, AND ENDURANCE: Primary | ICD-10-CM

## 2022-11-21 PROCEDURE — 97110 THERAPEUTIC EXERCISES: CPT | Mod: PN,CQ

## 2022-11-21 NOTE — PROGRESS NOTES
[facility]   Physical Therapy Treatment Note     Name: Jeffrey Ocasio  [facility] Number: [id number]    Therapy Diagnosis:   Encounter Diagnosis   Name Primary?    Impaired functional mobility, balance, gait, and endurance Yes     Physician: Cinthia Shrestha MD    Visit Date: 11/21/2022    Physician Orders: PT Eval and Treat   Medical Diagnosis from Referral: Leg weakness, shoulder pain, spinal stenosis, OA right shoulder   Evaluation Date: 11/8/2022  Authorization Period Expiration: 11/8/2023  Plan of Care Expiration: 01/31/2023  Visit # / Visits authorized: 4 / 12 (5 total)  FOTO Visit #:  1/3    PTA Visit #: 4/5     Time In: 11:00 AM  Time Out: 11:40 AM   Total Billable Time: 40 minutes    Precautions: Standard and Fall    SUBJECTIVE     Pt reports: I am tired today.  He was compliant with home exercise program.  Response to previous treatment: good  Functional change: none    Pain: 4/10  Location: right shoulder      OBJECTIVE     Objective Measures updated at progress report unless specified.     Treatment     JEFFREY received the treatments listed below:      therapeutic exercises to develop strength, endurance, and flexibility for 30 minutes including:    NuStep level 5 x 15 min  Chair exercises   Knee ext x 15   Heel/Toe lifts x 15   Marching x 15  Ball Squeezes x 2 min  Hip Abd w/ YTB x 15  Scapular Retraction w/ gray handled band x 15  Bicep Curls w/ 3# bar x 15    therapeutic activities to improve functional performance for 15  minutes, including:  Squat pivot transfers from chair to NuStep  Standing tolerance in para bars  Amb in para bars 5 ft x 3 with gait belt/CGA and following with chair  Car transfer  Added KT taping to left knee as test to reduce P/F rub  (Spoke with patients wife who understood the precautions)      Patient Education and Home Exercises     Home Exercises Provided and Patient Education Provided     Education provided:   - therapeutic exercise    Written Home  Exercises Provided: Patient instructed to cont prior HEP. Exercises were reviewed and JEFFREY was able to demonstrate them prior to the end of the session.  JEFFREY demonstrated good  understanding of the education provided. See EMR under Patient Instructions for exercises provided during therapy sessions    ASSESSMENT   Patient did well with exercises but requires verbal cues to complete the task.  Patient complained of increase left knee pain with walking.  Added VMO KT taping to reduce swelling of pes anserine and patellar femoral rub.    Jeffrey is a 89 y.o. male referred to outpatient Physical Therapy with a medical diagnosis of lumbar spinal stenosis, right shoulder OA, bilateral LE weakness. Pt presents with cognitive decline, impaired functional mobility, gait, balance and endurance, BLE weakness, peripheral neurpathy     JEFFREY Is progressing well towards his goals.   Pt prognosis is Good.     Pt will continue to benefit from skilled outpatient physical therapy to address the deficits listed in the problem list box on initial evaluation, provide pt/family education and to maximize pt's level of independence in the home and community environment.     Pt's spiritual, cultural and educational needs considered and pt agreeable to plan of care and goals.     Anticipated barriers to physical therapy: none    Goals:   Long Term Goals: 6 weeks   Able to perform sit <> stand with RW and SBA   Able to perform squat pivot t/f from W/C <> bed/chair/toilet with supervision   Able to ambulate with RW and CGA x 15ft   FOTO limitation score improved to 53%     PLAN     Continue with established plan of care.    Outpatient Physical Therapy 2 times weekly for 6 weeks to include the following interventions: Gait Training, Neuromuscular Re-ed, Patient Education, Therapeutic Activities, and Therapeutic Exercise.     Jonathan Favre, PTA

## 2022-11-23 ENCOUNTER — CLINICAL SUPPORT (OUTPATIENT)
Dept: REHABILITATION | Facility: HOSPITAL | Age: 87
End: 2022-11-23
Payer: MEDICARE

## 2022-11-23 DIAGNOSIS — Z74.09 IMPAIRED FUNCTIONAL MOBILITY, BALANCE, GAIT, AND ENDURANCE: Primary | ICD-10-CM

## 2022-11-23 PROCEDURE — 97116 GAIT TRAINING THERAPY: CPT | Mod: PN

## 2022-11-23 PROCEDURE — 97110 THERAPEUTIC EXERCISES: CPT | Mod: PN

## 2022-11-23 NOTE — PROGRESS NOTES
[facility]   Physical Therapy Treatment Note     Name: Jeffrey Ocasio  Clinic Number: [id number]    Therapy Diagnosis:   Encounter Diagnosis   Name Primary?    Impaired functional mobility, balance, gait, and endurance Yes     Physician: Citnhia Shrestha MD    Visit Date: 11/23/2022    Physician Orders: PT Eval and Treat   Medical Diagnosis from Referral: Leg weakness, shoulder pain, spinal stenosis, OA right shoulder   Evaluation Date: 11/8/2022  Authorization Period Expiration: 11/8/2023  Plan of Care Expiration: 01/31/2023  Visit # / Visits authorized: 5 / 12 (6 total)  FOTO Visit #:  1/3    PTA Visit #: 0/5     Time In: 1:45 pm   Time Out: 2:33pm   Total Billable Time: 40 minutes    Precautions: Standard and Fall    SUBJECTIVE     Pt reports: I'm doing ok today   He was compliant with home exercise program.  Response to previous treatment: good  Functional change: none    Pain: 1-2 /10  Location: right shoulder      OBJECTIVE     Objective Measures updated at progress report unless specified.     Treatment     JEFFREY received the treatments listed below:      therapeutic exercises to develop strength, endurance, and flexibility for 30 minutes including:    NuStep level 5 x 15 min  Chair exercises   Knee ext x 15   Heel/Toe lifts x 15   Marching x 15  Ball Squeezes x 2 min  Hip Abd w/ YTB x 15  Scapular Retraction w/ gray handled band x 15  Bicep Curls w/ 3# bar x 15    therapeutic activities to improve functional performance for 15  minutes, including:  Squat pivot transfers from chair to NuStep  Standing tolerance in para bars  Amb in para bars 5 ft x 3 with gait belt/CGA and following with chair    Re-taped Left knee - Lift-Off piece infrapatellar, I-strip from lateral aspect of tibia around medial aspect of left knee - pes anserine to VMO  for reduction of PF rub and pes anserine inflammation.     Patient Education and Home Exercises     Home Exercises Provided and Patient  Education Provided     Education provided:   - therapeutic exercise    Written Home Exercises Provided: Patient instructed to cont prior HEP. Exercises were reviewed and JEFFREY was able to demonstrate them prior to the end of the session.  JEFFREY demonstrated good  understanding of the education provided. See EMR under Patient Instructions for exercises provided during therapy sessions    ASSESSMENT   Patient did well with exercises but requires verbal cues to complete the task.  Patient  unsure if tape was helpful, but did not c/o pain in his left knee with standing/ walking activity.    Jeffrey is a 89 y.o. male referred to outpatient Physical Therapy with a medical diagnosis of lumbar spinal stenosis, right shoulder OA, bilateral LE weakness. Pt presents with cognitive decline, impaired functional mobility, gait, balance and endurance, BLE weakness, peripheral neurpathy     JEFFREY Is progressing well towards his goals.   Pt prognosis is Good.     Pt will continue to benefit from skilled outpatient physical therapy to address the deficits listed in the problem list box on initial evaluation, provide pt/family education and to maximize pt's level of independence in the home and community environment.     Pt's spiritual, cultural and educational needs considered and pt agreeable to plan of care and goals.     Anticipated barriers to physical therapy: none    Goals:   Long Term Goals: 6 weeks   Able to perform sit <> stand with RW and SBA   Able to perform squat pivot t/f from W/C <> bed/chair/toilet with supervision   Able to ambulate with RW and CGA x 15ft   FOTO limitation score improved to 53%     PLAN     Continue with established plan of care.    Outpatient Physical Therapy 2 times weekly for 6 weeks to include the following interventions: Gait Training, Neuromuscular Re-ed, Patient Education, Therapeutic Activities, and Therapeutic Exercise.     Dena Bauman, PT

## 2022-11-28 ENCOUNTER — CLINICAL SUPPORT (OUTPATIENT)
Dept: REHABILITATION | Facility: HOSPITAL | Age: 87
End: 2022-11-28
Payer: MEDICARE

## 2022-11-28 DIAGNOSIS — Z74.09 IMPAIRED FUNCTIONAL MOBILITY, BALANCE, GAIT, AND ENDURANCE: Primary | ICD-10-CM

## 2022-11-28 PROCEDURE — 97110 THERAPEUTIC EXERCISES: CPT | Mod: PN,CQ

## 2022-11-28 PROCEDURE — 97530 THERAPEUTIC ACTIVITIES: CPT | Mod: PN,CQ

## 2022-11-28 NOTE — PROGRESS NOTES
[facility]   Physical Therapy Treatment Note     Name: Jeffrey Ocasio  Clinic Number: [id number]    Therapy Diagnosis:   Encounter Diagnosis   Name Primary?    Impaired functional mobility, balance, gait, and endurance Yes     Physician: Cinthia Shrestha MD    Visit Date: 11/28/2022    Physician Orders: PT Eval and Treat   Medical Diagnosis from Referral: Leg weakness, shoulder pain, spinal stenosis, OA right shoulder   Evaluation Date: 11/8/2022  Authorization Period Expiration: 11/8/2023  Plan of Care Expiration: 01/31/2023  Visit # / Visits authorized: 6 / 12 (7 total)  FOTO Visit #:  1/3    PTA Visit #: 1/5     Time In: 11:00 AM  Time Out: 11:45 AM  Total Billable Time: 40 minutes    Precautions: Standard and Fall    SUBJECTIVE     Pt reports: No new c/o's.  He was compliant with home exercise program.  Response to previous treatment: good  Functional change: none    Pain: 2-3 /10  Location: right shoulder      OBJECTIVE     Objective Measures updated at progress report unless specified.     Treatment     JEFFREY received the treatments listed below:      therapeutic exercises to develop strength, endurance, and flexibility for 30 minutes including:    NuStep level 5 x 15 min  Chair exercises   Knee ext x 15   Heel/Toe lifts x 15   Marching x 15  Ball Squeezes x 2 min  Hip Abd w/ YTB x 15  Scapular Retraction w/ gray handled band x 15  Bicep Curls w/ 3# bar x 15    therapeutic activities to improve functional performance for 15  minutes, including:  Squat pivot transfers from chair to NuStep  Standing tolerance in para bars  Amb in para bars 5 ft x 3 with gait belt/CGA and following with chair    Re-taped Left knee - Lift-Off piece infrapatellar, I-strip from lateral aspect of tibia around medial aspect of left knee - pes anserine to VMO  for reduction of PF rub and pes anserine inflammation.     Patient Education and Home Exercises     Home Exercises Provided and Patient Education  Provided     Education provided:   - therapeutic exercise    Written Home Exercises Provided: Patient instructed to cont prior HEP. Exercises were reviewed and JEFFREY was able to demonstrate them prior to the end of the session.  JEFFREY demonstrated good  understanding of the education provided. See EMR under Patient Instructions for exercises provided during therapy sessions    ASSESSMENT   Patient did well with exercises but requires verbal cues to complete the task.  Patient  unsure if tape was helpful, but did not c/o pain in his left knee with standing/ walking activity.    Jeffrey is a 89 y.o. male referred to outpatient Physical Therapy with a medical diagnosis of lumbar spinal stenosis, right shoulder OA, bilateral LE weakness. Pt presents with cognitive decline, impaired functional mobility, gait, balance and endurance, BLE weakness, peripheral neurpathy     JEFFREY Is progressing well towards his goals.   Pt prognosis is Good.     Pt will continue to benefit from skilled outpatient physical therapy to address the deficits listed in the problem list box on initial evaluation, provide pt/family education and to maximize pt's level of independence in the home and community environment.     Pt's spiritual, cultural and educational needs considered and pt agreeable to plan of care and goals.     Anticipated barriers to physical therapy: none    Goals:   Long Term Goals: 6 weeks   Able to perform sit <> stand with RW and SBA   Able to perform squat pivot t/f from W/C <> bed/chair/toilet with supervision   Able to ambulate with RW and CGA x 15ft   FOTO limitation score improved to 53%     PLAN     Continue with established plan of care.    Outpatient Physical Therapy 2 times weekly for 6 weeks to include the following interventions: Gait Training, Neuromuscular Re-ed, Patient Education, Therapeutic Activities, and Therapeutic Exercise.     Jonathan Favre, PTA

## 2022-12-05 ENCOUNTER — CLINICAL SUPPORT (OUTPATIENT)
Dept: REHABILITATION | Facility: HOSPITAL | Age: 87
End: 2022-12-05
Payer: MEDICARE

## 2022-12-05 DIAGNOSIS — Z74.09 IMPAIRED FUNCTIONAL MOBILITY, BALANCE, GAIT, AND ENDURANCE: Primary | ICD-10-CM

## 2022-12-05 PROCEDURE — 97110 THERAPEUTIC EXERCISES: CPT | Mod: PN,CQ

## 2022-12-05 NOTE — PROGRESS NOTES
[facility]   Physical Therapy Treatment Note     Name: Jeffrey Ocasio  Clinic Number: [id number]    Therapy Diagnosis:   Encounter Diagnosis   Name Primary?    Impaired functional mobility, balance, gait, and endurance Yes     Physician: Cinthia Shrestha MD    Visit Date: 12/5/2022    Physician Orders: PT Eval and Treat   Medical Diagnosis from Referral: Leg weakness, shoulder pain, spinal stenosis, OA right shoulder   Evaluation Date: 11/8/2022  Authorization Period Expiration: 11/8/2023  Plan of Care Expiration: 01/31/2023  Visit # / Visits authorized: 7 / 12 (8 total)  FOTO Visit #:  1/3    PTA Visit #: 2/5     Time In: 10:55 AM  Time Out: 11:40 AM  Total Billable Time: 40 minutes    Precautions: Standard and Fall    SUBJECTIVE     Pt reports: No new c/o's.  He was compliant with home exercise program.  Response to previous treatment: good  Functional change: none    Pain: 2-3 /10  Location: right shoulder      OBJECTIVE     Objective Measures updated at progress report unless specified.     Treatment     JEFFREY received the treatments listed below:      therapeutic exercises to develop strength, endurance, and flexibility for 30 minutes including:    NuStep level 5 x 15 min  Chair exercises   Knee ext x 15   Heel/Toe lifts x 15   Marching x 15  Ball Squeezes x 2 min  Hip Abd w/ YTB x 20  Scapular Retraction w/ gray handled band x 15  Bicep Curls w/ 3# bar x 20    therapeutic activities to improve functional performance for 15  minutes, including:  Squat pivot transfers from chair to NuStep  Amb w/ RW, Mod Assist x 5 steps- fearful of falling, knee pain, unable to fully extend LE's.  Amb in para bars 5 ft x 3 with gait belt/CGA and following with chair    Re-taped Left knee - Lift-Off piece infrapatellar, I-strip from lateral aspect of tibia around medial aspect of left knee - pes anserine to VMO  for reduction of PF rub and pes anserine inflammation.     Patient Education and Home  Exercises     Home Exercises Provided and Patient Education Provided     Education provided:   - therapeutic exercise    Written Home Exercises Provided: Patient instructed to cont prior HEP. Exercises were reviewed and JEFFREY was able to demonstrate them prior to the end of the session.  JEFFREY demonstrated good  understanding of the education provided. See EMR under Patient Instructions for exercises provided during therapy sessions    ASSESSMENT   Patient did well with exercises but requires verbal cues to complete the task.  Patient  unsure if tape was helpful, but did not c/o pain in his left knee with standing/ walking activity.    Jeffrey is a 89 y.o. male referred to outpatient Physical Therapy with a medical diagnosis of lumbar spinal stenosis, right shoulder OA, bilateral LE weakness. Pt presents with cognitive decline, impaired functional mobility, gait, balance and endurance, BLE weakness, peripheral neurpathy     JEFFREY Is progressing well towards his goals.   Pt prognosis is Good.     Pt will continue to benefit from skilled outpatient physical therapy to address the deficits listed in the problem list box on initial evaluation, provide pt/family education and to maximize pt's level of independence in the home and community environment.     Pt's spiritual, cultural and educational needs considered and pt agreeable to plan of care and goals.     Anticipated barriers to physical therapy: none    Goals:   Long Term Goals: 6 weeks   Able to perform sit <> stand with RW and SBA   Able to perform squat pivot t/f from W/C <> bed/chair/toilet with supervision   Able to ambulate with RW and CGA x 15ft   FOTO limitation score improved to 53%     PLAN     Continue with established plan of care.    Outpatient Physical Therapy 2 times weekly for 6 weeks to include the following interventions: Gait Training, Neuromuscular Re-ed, Patient Education, Therapeutic Activities, and Therapeutic Exercise.     Jonathan Favre,  PTA

## 2022-12-08 ENCOUNTER — CLINICAL SUPPORT (OUTPATIENT)
Dept: REHABILITATION | Facility: HOSPITAL | Age: 87
End: 2022-12-08
Payer: MEDICARE

## 2022-12-08 DIAGNOSIS — Z74.09 IMPAIRED FUNCTIONAL MOBILITY, BALANCE, GAIT, AND ENDURANCE: Primary | ICD-10-CM

## 2022-12-08 PROCEDURE — 97110 THERAPEUTIC EXERCISES: CPT | Mod: PN,CQ

## 2022-12-08 NOTE — PROGRESS NOTES
[facility]   Physical Therapy Treatment Note     Name: Jeffrey Ocasio  Clinic Number: [id number]    Therapy Diagnosis:   Encounter Diagnosis   Name Primary?    Impaired functional mobility, balance, gait, and endurance Yes     Physician: Cinthia Shrestha MD    Visit Date: 12/8/2022    Physician Orders: PT Eval and Treat   Medical Diagnosis from Referral: Leg weakness, shoulder pain, spinal stenosis, OA right shoulder   Evaluation Date: 11/8/2022  Authorization Period Expiration: 11/8/2023  Plan of Care Expiration: 01/31/2023  Visit # / Visits authorized: 8 / 12 (9 total)  FOTO Visit #:  1/3    PTA Visit #: 3/5     Time In: 10:50 AM  Time Out: 11:40 AM  Total Billable Time: 40 minutes    Precautions: Standard and Fall    SUBJECTIVE     Pt reports: No new c/o's.  He was compliant with home exercise program.  Response to previous treatment: good  Functional change: none    Pain: 2-3 /10  Location: right shoulder      OBJECTIVE     Objective Measures updated at progress report unless specified.     Treatment     JEFFREY received the treatments listed below:      therapeutic exercises to develop strength, endurance, and flexibility for 30 minutes including:    NuStep level 5 x 15 min  Chair exercises   Knee ext x 15   Heel/Toe lifts x 20   Marching x 15  Ball Squeezes x 2 min  Hip Abd w/ YTB x 20  Scapular Retraction w/ gray handled band x 15  Bicep Curls w/ 4# bar x 20    therapeutic activities to improve functional performance for 5 minutes, including:  Squat pivot transfers from chair to NuStep  Amb w/ RW, Mod Assist x 5 steps- fearful of falling, knee pain, unable to fully extend LE's.  Amb in para bars 5 ft x 3 with gait belt/CGA and following with chair    Re-taped Left knee - Lift-Off piece infrapatellar, I-strip from lateral aspect of tibia around medial aspect of left knee - pes anserine to VMO  for reduction of PF rub and pes anserine inflammation.     Patient Education and Home  Exercises     Home Exercises Provided and Patient Education Provided     Education provided:   - therapeutic exercise    Written Home Exercises Provided: Patient instructed to cont prior HEP. Exercises were reviewed and JEFFREY was able to demonstrate them prior to the end of the session.  JEFFREY demonstrated good  understanding of the education provided. See EMR under Patient Instructions for exercises provided during therapy sessions    ASSESSMENT   Patient did well with exercises but requires verbal cues to complete the task.  Patient  unsure if tape was helpful, but did not c/o pain in his left knee with standing/ walking activity.    Jeffrey is a 89 y.o. male referred to outpatient Physical Therapy with a medical diagnosis of lumbar spinal stenosis, right shoulder OA, bilateral LE weakness. Pt presents with cognitive decline, impaired functional mobility, gait, balance and endurance, BLE weakness, peripheral neurpathy     JEFFREY Is progressing well towards his goals.   Pt prognosis is Good.     Pt will continue to benefit from skilled outpatient physical therapy to address the deficits listed in the problem list box on initial evaluation, provide pt/family education and to maximize pt's level of independence in the home and community environment.     Pt's spiritual, cultural and educational needs considered and pt agreeable to plan of care and goals.     Anticipated barriers to physical therapy: none    Goals:   Long Term Goals: 6 weeks   Able to perform sit <> stand with RW and SBA   Able to perform squat pivot t/f from W/C <> bed/chair/toilet with supervision   Able to ambulate with RW and CGA x 15ft   FOTO limitation score improved to 53%     PLAN     Continue with established plan of care.    Outpatient Physical Therapy 2 times weekly for 6 weeks to include the following interventions: Gait Training, Neuromuscular Re-ed, Patient Education, Therapeutic Activities, and Therapeutic Exercise.     Jonathan Favre,  PTA

## 2023-01-10 ENCOUNTER — HOSPITAL ENCOUNTER (EMERGENCY)
Facility: HOSPITAL | Age: 88
Discharge: HOME | End: 2023-01-10
Attending: EMERGENCY MEDICINE
Payer: MEDICARE

## 2023-01-10 VITALS
OXYGEN SATURATION: 96 % | WEIGHT: 210 LBS | HEIGHT: 70 IN | DIASTOLIC BLOOD PRESSURE: 74 MMHG | BODY MASS INDEX: 30.06 KG/M2 | TEMPERATURE: 98 F | RESPIRATION RATE: 21 BRPM | HEART RATE: 89 BPM | SYSTOLIC BLOOD PRESSURE: 112 MMHG

## 2023-01-10 DIAGNOSIS — F03.90 CHRONIC DEMENTIA WITHOUT BEHAVIORAL DISTURBANCE: Primary | ICD-10-CM

## 2023-01-10 LAB
ALBUMIN SERPL BCP-MCNC: 3.6 G/DL (ref 3.5–5.2)
ALP SERPL-CCNC: 110 U/L (ref 55–135)
ALT SERPL W/O P-5'-P-CCNC: 28 U/L (ref 10–44)
AMMONIA PLAS-SCNC: 52 UMOL/L (ref 10–50)
AMPHET+METHAMPHET UR QL: NEGATIVE
ANION GAP SERPL CALC-SCNC: 14 MMOL/L (ref 8–16)
AST SERPL-CCNC: 30 U/L (ref 10–40)
BARBITURATES UR QL SCN>200 NG/ML: ABNORMAL
BASOPHILS # BLD AUTO: 0.06 K/UL (ref 0–0.2)
BASOPHILS NFR BLD: 0.4 % (ref 0–1.9)
BENZODIAZ UR QL SCN>200 NG/ML: NEGATIVE
BILIRUB SERPL-MCNC: 0.6 MG/DL (ref 0.1–1)
BILIRUB UR QL STRIP: NEGATIVE
BUN SERPL-MCNC: 16 MG/DL (ref 8–23)
BZE UR QL SCN: NEGATIVE
CALCIUM SERPL-MCNC: 9.3 MG/DL (ref 8.7–10.5)
CANNABINOIDS UR QL SCN: NEGATIVE
CHLORIDE SERPL-SCNC: 101 MMOL/L (ref 95–110)
CLARITY UR: CLEAR
CO2 SERPL-SCNC: 28 MMOL/L (ref 23–29)
COLOR UR: YELLOW
CREAT SERPL-MCNC: 1.1 MG/DL (ref 0.5–1.4)
CREAT UR-MCNC: 74.6 MG/DL (ref 23–375)
DIFFERENTIAL METHOD: ABNORMAL
EOSINOPHIL # BLD AUTO: 0.1 K/UL (ref 0–0.5)
EOSINOPHIL NFR BLD: 1 % (ref 0–8)
ERYTHROCYTE [DISTWIDTH] IN BLOOD BY AUTOMATED COUNT: 14.5 % (ref 11.5–14.5)
EST. GFR  (NO RACE VARIABLE): >60 ML/MIN/1.73 M^2
GLUCOSE SERPL-MCNC: 134 MG/DL (ref 70–110)
GLUCOSE UR QL STRIP: NEGATIVE
HCT VFR BLD AUTO: 44.3 % (ref 40–54)
HGB BLD-MCNC: 15.1 G/DL (ref 14–18)
HGB UR QL STRIP: NEGATIVE
IMM GRANULOCYTES # BLD AUTO: 0.05 K/UL (ref 0–0.04)
IMM GRANULOCYTES NFR BLD AUTO: 0.4 % (ref 0–0.5)
KETONES UR QL STRIP: NEGATIVE
LEUKOCYTE ESTERASE UR QL STRIP: NEGATIVE
LYMPHOCYTES # BLD AUTO: 0.9 K/UL (ref 1–4.8)
LYMPHOCYTES NFR BLD: 6.5 % (ref 18–48)
MCH RBC QN AUTO: 31.1 PG (ref 27–31)
MCHC RBC AUTO-ENTMCNC: 34.1 G/DL (ref 32–36)
MCV RBC AUTO: 91 FL (ref 82–98)
METHADONE UR QL SCN>300 NG/ML: NEGATIVE
MONOCYTES # BLD AUTO: 1 K/UL (ref 0.3–1)
MONOCYTES NFR BLD: 6.9 % (ref 4–15)
NEUTROPHILS # BLD AUTO: 11.8 K/UL (ref 1.8–7.7)
NEUTROPHILS NFR BLD: 84.8 % (ref 38–73)
NITRITE UR QL STRIP: NEGATIVE
NRBC BLD-RTO: 0 /100 WBC
OPIATES UR QL SCN: NEGATIVE
PCP UR QL SCN>25 NG/ML: NEGATIVE
PH UR STRIP: 5 [PH] (ref 5–8)
PLATELET # BLD AUTO: 213 K/UL (ref 150–450)
PMV BLD AUTO: 11.4 FL (ref 9.2–12.9)
POTASSIUM SERPL-SCNC: 3.5 MMOL/L (ref 3.5–5.1)
PROT SERPL-MCNC: 6.8 G/DL (ref 6–8.4)
PROT UR QL STRIP: NEGATIVE
RBC # BLD AUTO: 4.85 M/UL (ref 4.6–6.2)
SODIUM SERPL-SCNC: 143 MMOL/L (ref 136–145)
SP GR UR STRIP: 1.01 (ref 1–1.03)
TOXICOLOGY INFORMATION: ABNORMAL
URN SPEC COLLECT METH UR: NORMAL
UROBILINOGEN UR STRIP-ACNC: NEGATIVE EU/DL
WBC # BLD AUTO: 13.93 K/UL (ref 3.9–12.7)

## 2023-01-10 PROCEDURE — 85025 COMPLETE CBC W/AUTO DIFF WBC: CPT | Performed by: EMERGENCY MEDICINE

## 2023-01-10 PROCEDURE — 36415 COLL VENOUS BLD VENIPUNCTURE: CPT | Performed by: EMERGENCY MEDICINE

## 2023-01-10 PROCEDURE — 70450 CT HEAD/BRAIN W/O DYE: CPT | Mod: TC

## 2023-01-10 PROCEDURE — 82140 ASSAY OF AMMONIA: CPT | Performed by: EMERGENCY MEDICINE

## 2023-01-10 PROCEDURE — 70450 CT HEAD/BRAIN W/O DYE: CPT | Mod: 26,,, | Performed by: RADIOLOGY

## 2023-01-10 PROCEDURE — 99284 EMERGENCY DEPT VISIT MOD MDM: CPT | Mod: 25

## 2023-01-10 PROCEDURE — 70450 CT HEAD WITHOUT CONTRAST: ICD-10-PCS | Mod: 26,,, | Performed by: RADIOLOGY

## 2023-01-10 PROCEDURE — 81003 URINALYSIS AUTO W/O SCOPE: CPT | Mod: 59 | Performed by: EMERGENCY MEDICINE

## 2023-01-10 PROCEDURE — 80307 DRUG TEST PRSMV CHEM ANLYZR: CPT | Performed by: EMERGENCY MEDICINE

## 2023-01-10 PROCEDURE — 80053 COMPREHEN METABOLIC PANEL: CPT | Performed by: EMERGENCY MEDICINE

## 2023-01-10 NOTE — ED TRIAGE NOTES
Pt presents to the er via ems for decreased mental state over the last couple months per the pts family. Pt not able to answer orientation questions during triage

## 2023-01-10 NOTE — ED PROVIDER NOTES
Encounter Date: 1/10/2023       History     Chief Complaint   Patient presents with    Altered Mental Status     Patient is an 89-year-old male brought from home via EMS at the request of his wife.  Evidently the patient was sitting on the potty chair, and could not get up.  His wife states that he had gotten in bed to take a nap, so she use the opportunity to run a quick errand.  When she got back home, he had somehow gotten from the bed over to the potty chair.  He stated that he could not get up and she states that she had to call the fire department to help her get him back into the bed.  One of the firemen stated that he thought he noted some slurring to the patient's speech, so it was decided that the patient she be brought here for evaluation.  Wife states patient has a long history of dementia, and he is to the point where she is preparing to place him in some sort of facility because she is having trouble caring for him as of late.  She states that she has occasionally heard some mildly slurred speech, but this is nothing new.  She states that to her, he appears to be at baseline.  Patient is very pleasant and denies any pain or discomfort.  He is confused about wary is.  No obvious neurologic deficits other than his symptoms of dementia.  Wife states that he is to the point now where he can not ambulate on his own without assistance.  She was surprised that he was able to get out of the bed and get to potty chair.    Review of patient's allergies indicates:   Allergen Reactions    Grass pollen     Fish containing products      Past Medical History:   Diagnosis Date    Allergy     Asthma     DVT (deep venous thrombosis)     Lymphedema of right lower extremity      Past Surgical History:   Procedure Laterality Date    KNEE SURGERY      7 left knee surgery      Family History   Problem Relation Age of Onset    No Known Problems Mother     No Known Problems Father      Social History     Tobacco Use    Smoking  status: Never    Smokeless tobacco: Never   Substance Use Topics    Alcohol use: No    Drug use: No     Review of Systems   Constitutional: Negative.         Review of systems is given by both wife and patient   HENT: Negative.     Eyes: Negative.    Respiratory: Negative.     Cardiovascular:  Positive for leg swelling (Chronic bilateral pedal edema).   Gastrointestinal: Negative.    Endocrine: Negative.    Musculoskeletal: Negative.    Skin: Negative.    Allergic/Immunologic: Negative.    Neurological:  Positive for weakness (Chronic generalized weakness, apparently unchanged per wife). Negative for tremors, seizures, syncope, facial asymmetry, speech difficulty, numbness and headaches.   Psychiatric/Behavioral:  Positive for confusion and decreased concentration. Negative for agitation, behavioral problems, dysphoric mood and self-injury. The patient is not nervous/anxious.      Physical Exam     Initial Vitals [01/10/23 1440]   BP Pulse Resp Temp SpO2   110/75 100 20 98 °F (36.7 °C) (!) 93 %      MAP       --         Physical Exam    Nursing note and vitals reviewed.  Constitutional: He appears well-developed and well-nourished. He is not diaphoretic. No distress.   HENT:   Head: Normocephalic and atraumatic.   Nose: Nose normal.   Mouth/Throat: Oropharynx is clear and moist.   Eyes: Conjunctivae and EOM are normal. Pupils are equal, round, and reactive to light. No scleral icterus.   Neck: Neck supple. No JVD present.   Normal range of motion.  Cardiovascular:  Normal rate, regular rhythm, normal heart sounds and intact distal pulses.           No murmur heard.  Pulmonary/Chest: Breath sounds normal. No stridor. No respiratory distress. He has no wheezes.   Abdominal: Abdomen is soft. Bowel sounds are normal. He exhibits no distension. There is no abdominal tenderness.   Musculoskeletal:         General: Edema present. No tenderness.      Cervical back: Normal range of motion and neck supple.      Comments:  There is bilateral pedal edema, apparently chronic according to wife.  There are surgical scars on the anterior surface of the knees consistent with joint replacement.  All joints or normal to appearance and touch.     Neurological: He is alert. No cranial nerve deficit or sensory deficit.   GCS 14 (dementia).  Generalized debilitation/weakness, no focal deficits.  Speech clear and concise.   Skin: Skin is warm and dry. Capillary refill takes less than 2 seconds. No rash noted. No erythema.   Psychiatric:   Pleasant, calm, cooperative       ED Course   Procedures  Labs Reviewed   CBC W/ AUTO DIFFERENTIAL - Abnormal; Notable for the following components:       Result Value    WBC 13.93 (*)     MCH 31.1 (*)     Gran # (ANC) 11.8 (*)     Immature Grans (Abs) 0.05 (*)     Lymph # 0.9 (*)     Gran % 84.8 (*)     Lymph % 6.5 (*)     All other components within normal limits   COMPREHENSIVE METABOLIC PANEL - Abnormal; Notable for the following components:    Glucose 134 (*)     All other components within normal limits   AMMONIA - Abnormal; Notable for the following components:    Ammonia 52 (*)     All other components within normal limits   DRUG SCREEN PANEL, URINE EMERGENCY - Abnormal; Notable for the following components:    Barbiturate Screen, Ur Presumptive Positive (*)     All other components within normal limits    Narrative:     Preferred Collection Type->Urine, Clean Catch  Specimen Source->Urine   URINALYSIS, REFLEX TO URINE CULTURE    Narrative:     Preferred Collection Type->Urine, Clean Catch  Specimen Source->Urine          Imaging Results              CT Head Without Contrast (Final result)  Result time 01/10/23 16:00:14      Final result by Gonzalo Sen MD (01/10/23 16:00:14)                   Impression:      Cortical atrophy with periventricular deep white matter change consistent with chronic small vessel ischemic disease.      Electronically signed by: Gonzalo  Orange  Date:    01/10/2023  Time:    16:00               Narrative:    EXAMINATION:  CT HEAD WITHOUT CONTRAST    CLINICAL HISTORY:  Mental status change, unknown cause;    TECHNIQUE:  Low dose axial images were obtained through the head.  Coronal and sagittal reformations were also performed. Contrast was not administered.    COMPARISON:  None.    FINDINGS:  There is no acute hemorrhage or infarction.  There is cortical atrophy.  There are periventricular deep white matter changes consistent with chronic small vessel ischemic disease.  Benign basal ganglia calcifications are present.    No extra-axial fluid collections.  Ventricles are normal in size, shape and configuration.  The basal cisterns are patent.    The imaged paranasal sinuses and ethmoid air cells are well aerated.    The mastoid air cells and middle ears are normally pneumatized.                                    X-Rays:   Independently Interpreted Readings:   Other Readings:  CT brain, personally reviewed by me shows no evidence of intracranial hemorrhage, no mass, no mass effect.  Chronic age-related changes and atrophy.  Medications - No data to display  Medical Decision Making:   Differential Diagnosis:   CVA, TIA, acute on chronic dementia, urinary tract infection, metabolic encephalopathy, etc.  ED Management:  Patient's labs and CT did not show any acute changes or findings to explain his current symptoms.  I believe he is simply showing a gradual decline secondary to his dementia and advanced age.  I believe he is safe for discharge home.  His wife states that she is making plans to put him in some sort of care facility.  She will contact her primary care provider tomorrow to assist with this.  Return here for any worsening signs or symptoms.                        Clinical Impression:   Final diagnoses:  [F03.90] Chronic dementia without behavioral disturbance (Primary)        ED Disposition Condition    Discharge Stable          ED  Prescriptions    None       Follow-up Information       Follow up With Specialties Details Why Contact Info    Cinthia Shrestha MD Internal Medicine Call in 1 day  [address]  [place] [facility] [place] [address] [phone]      [facility] [place] Emergency Dept Emergency Medicine  As needed, If symptoms worsen [address] [facility] [address] [phone]             Arcadio Gupta MD  01/10/23 [id number]

## 2023-01-11 NOTE — DISCHARGE INSTRUCTIONS
As we discussed, continue previously prescribed medications and treatments, and follow-up with primary care provider.  Return here for any worsening signs or symptoms.

## 2023-01-11 NOTE — ED NOTES
Set up transportation back to residence with Shanae at [facility].  She stated that patient is four-eliane on the list. Shanae asked to be notified if patient's wife leaves the hospital so that they can call her to make sure she is at the home at time of transport.

## 2023-08-15 NOTE — PROGRESS NOTES
Physical Therapy Daily Treatment Note     Name: Jeffrey Ocasio  [facility] Number: [id number]    Therapy Diagnosis:   Encounter Diagnosis   Name Primary?    Muscle weakness (generalized) Yes     Physician: Cinthia Shrestha MD    Physician Orders: Physical Therapy Eval and Treat for generalized muscle weakness  Medical Diagnosis: gerneralized weakness   Evaluation Date: 18  Authorization period Expiration: 18  Plan of Care Certification Period: 18    Visit #: 3/ Visits authorized: 12  Time In: 1:00  Time Out: 2:00  Total Billable Time: 30 minutes    Precautions: std    Subjective   Pt reports:no new c/o's  Response to previous treatment:  Functional change: see previous progress report to MD.    Pain:  Current 3/10, worst 3/10, best 0/10   Location: back  and neck  bilateral    Objective   JEFFREY received therapeutic exercises to develop strength, flexibility, posture and core stabilization for 45 minutes includin. Nu-Step x 20 mins L5  2. Vigor Gym level 7 x 8 mins   3. Bridges x 20  4. SLR x 10  5. Hip Abduction x 10  6. DKC with Swiss Ball x 3 mins  7. Lumbar rolls x 3 mins  8. Seated Lumbar Flex with SB x 4 mins  9. Scapular Retraction 15x with black band  10. Wall Slides x 20    JEFFREY received the following supervised modalities after being cleared for contradictions    Home Exercises Provided and Patient Education Provided     Education provided re:   - progress towards goals   - role of therapy in multi - disciplinary team, goals for therapy  No spiritual or educational barriers to learning provided    Written Home Exercises Provided: .  Exercises were reviewed and JEFFREY was able to demonstrate them prior to the end of the session.    JEFFREY demonstrated good  understanding of the education provided.     Assessment   Jeffrey is JEFFREY is progressing well towards his goals. Patient did well with exercises.     30 sec Chair <>Stand: 5 reps  6 min walk test: able to  "ambulate 4 mins on level surface. Stops activity secondary to increased LBP and fatigue.   G-Codes:  Current:: CK        Goal: CJ  Pt prognosis is Good.     Pt will continue to benefit from skilled outpatient physical therapy to address the deficits listed in the problem list box on initial evaluation, provide pt/family education and to maximize pt's level of independence in the home and community environment.     Anticipated barriers to physical therapy: patient's reluctance to be consistent with any lasting exercise program.    Pt's spiritual, cultural and educational needs considered and pt agreeable to plan of care and goals.    Goals:   LTG: ( 4 weeks)  1. Subjective pain no more than 2/10 with daily activity  2. Patient able to complete 8 reps chair <> 30 secs (<20" height)  3. Patient able to demonstrate imrpoved endurance for activity - able to ambulate x 6 mins without increase in symptoms.       Plan   Continue with established plan of care. Yes, patient to continue 2x/week x 4-6 weeks for Ther Exercise, strengthening, cardiovascular activity    Dena Bauman, PT   " Current and Past Psychiatric Diagnoses/Activating Events/Stressors